# Patient Record
Sex: MALE | Race: WHITE | Employment: FULL TIME | URBAN - METROPOLITAN AREA
[De-identification: names, ages, dates, MRNs, and addresses within clinical notes are randomized per-mention and may not be internally consistent; named-entity substitution may affect disease eponyms.]

---

## 2017-10-03 ENCOUNTER — HOSPITAL ENCOUNTER (OUTPATIENT)
Dept: CT IMAGING | Age: 67
Discharge: HOME | End: 2017-10-03
Attending: INTERNAL MEDICINE
Payer: COMMERCIAL

## 2017-10-03 VITALS — BODY MASS INDEX: 26.41 KG/M2 | HEIGHT: 72 IN | WEIGHT: 195 LBS

## 2017-10-03 DIAGNOSIS — J90 PLEURAL EFFUSION, RIGHT: ICD-10-CM

## 2017-10-03 DIAGNOSIS — J18.9 PNEUMONIA OF RIGHT LOWER LOBE DUE TO INFECTIOUS ORGANISM: ICD-10-CM

## 2017-10-03 LAB — CREAT BLD-MCNC: 1.1 MG/DL (ref 0.8–1.5)

## 2017-10-03 PROCEDURE — 71260 CT THORAX DX C+: CPT

## 2017-10-03 PROCEDURE — 74011000258 HC RX REV CODE- 258: Performed by: INTERNAL MEDICINE

## 2017-10-03 PROCEDURE — 82565 ASSAY OF CREATININE: CPT

## 2017-10-03 PROCEDURE — 74011636320 HC RX REV CODE- 636/320: Performed by: INTERNAL MEDICINE

## 2017-10-03 RX ORDER — SODIUM CHLORIDE 0.9 % (FLUSH) 0.9 %
10 SYRINGE (ML) INJECTION
Status: COMPLETED | OUTPATIENT
Start: 2017-10-03 | End: 2017-10-03

## 2017-10-03 RX ADMIN — Medication 10 ML: at 15:26

## 2017-10-03 RX ADMIN — IOPAMIDOL 80 ML: 755 INJECTION, SOLUTION INTRAVENOUS at 15:26

## 2017-10-03 RX ADMIN — SODIUM CHLORIDE 100 ML: 900 INJECTION, SOLUTION INTRAVENOUS at 15:26

## 2017-10-10 ENCOUNTER — HOSPITAL ENCOUNTER (INPATIENT)
Age: 67
LOS: 5 days | Discharge: HOME | DRG: 165 | End: 2017-10-15
Attending: INTERNAL MEDICINE | Admitting: INTERNAL MEDICINE
Payer: COMMERCIAL

## 2017-10-10 DIAGNOSIS — J86.9 EMPYEMA (HCC): ICD-10-CM

## 2017-10-10 DIAGNOSIS — E11.21 TYPE II DIABETES MELLITUS WITH NEPHROPATHY (HCC): Chronic | ICD-10-CM

## 2017-10-10 DIAGNOSIS — E03.9 HYPOTHYROIDISM, UNSPECIFIED TYPE: Chronic | ICD-10-CM

## 2017-10-10 DIAGNOSIS — J90 PLEURAL EFFUSION ON RIGHT: ICD-10-CM

## 2017-10-10 LAB
ANION GAP SERPL CALC-SCNC: 6 MMOL/L (ref 7–16)
BUN SERPL-MCNC: 18 MG/DL (ref 8–23)
CALCIUM SERPL-MCNC: 9.5 MG/DL (ref 8.3–10.4)
CHLORIDE SERPL-SCNC: 102 MMOL/L (ref 98–107)
CO2 SERPL-SCNC: 28 MMOL/L (ref 21–32)
CREAT SERPL-MCNC: 1.04 MG/DL (ref 0.8–1.5)
ERYTHROCYTE [DISTWIDTH] IN BLOOD BY AUTOMATED COUNT: 12.3 % (ref 11.9–14.6)
GLUCOSE BLD STRIP.AUTO-MCNC: 180 MG/DL (ref 65–100)
GLUCOSE BLD STRIP.AUTO-MCNC: 220 MG/DL (ref 65–100)
GLUCOSE SERPL-MCNC: 139 MG/DL (ref 65–100)
HCT VFR BLD AUTO: 38.3 % (ref 41.1–50.3)
HGB BLD-MCNC: 13.2 G/DL (ref 13.6–17.2)
MCH RBC QN AUTO: 33.2 PG (ref 26.1–32.9)
MCHC RBC AUTO-ENTMCNC: 34.5 G/DL (ref 31.4–35)
MCV RBC AUTO: 96.5 FL (ref 79.6–97.8)
PLATELET # BLD AUTO: 452 K/UL (ref 150–450)
PMV BLD AUTO: 8.9 FL (ref 10.8–14.1)
POTASSIUM SERPL-SCNC: 4.3 MMOL/L (ref 3.5–5.1)
RBC # BLD AUTO: 3.97 M/UL (ref 4.23–5.67)
SODIUM SERPL-SCNC: 136 MMOL/L (ref 136–145)
WBC # BLD AUTO: 9.7 K/UL (ref 4.3–11.1)

## 2017-10-10 PROCEDURE — 76604 US EXAM CHEST: CPT | Performed by: INTERNAL MEDICINE

## 2017-10-10 PROCEDURE — 74011000258 HC RX REV CODE- 258: Performed by: NURSE PRACTITIONER

## 2017-10-10 PROCEDURE — 87205 SMEAR GRAM STAIN: CPT | Performed by: INTERNAL MEDICINE

## 2017-10-10 PROCEDURE — 87077 CULTURE AEROBIC IDENTIFY: CPT | Performed by: INTERNAL MEDICINE

## 2017-10-10 PROCEDURE — 87040 BLOOD CULTURE FOR BACTERIA: CPT | Performed by: NURSE PRACTITIONER

## 2017-10-10 PROCEDURE — 65270000029 HC RM PRIVATE

## 2017-10-10 PROCEDURE — 82962 GLUCOSE BLOOD TEST: CPT

## 2017-10-10 PROCEDURE — 74011636637 HC RX REV CODE- 636/637: Performed by: INTERNAL MEDICINE

## 2017-10-10 PROCEDURE — 74011250637 HC RX REV CODE- 250/637: Performed by: NURSE PRACTITIONER

## 2017-10-10 PROCEDURE — 0W993ZX DRAINAGE OF RIGHT PLEURAL CAVITY, PERCUTANEOUS APPROACH, DIAGNOSTIC: ICD-10-PCS | Performed by: INTERNAL MEDICINE

## 2017-10-10 PROCEDURE — 76040000019: Performed by: INTERNAL MEDICINE

## 2017-10-10 PROCEDURE — 99223 1ST HOSP IP/OBS HIGH 75: CPT | Performed by: INTERNAL MEDICINE

## 2017-10-10 PROCEDURE — 87186 SC STD MICRODIL/AGAR DIL: CPT | Performed by: INTERNAL MEDICINE

## 2017-10-10 PROCEDURE — 36415 COLL VENOUS BLD VENIPUNCTURE: CPT | Performed by: NURSE PRACTITIONER

## 2017-10-10 PROCEDURE — 74011250636 HC RX REV CODE- 250/636: Performed by: NURSE PRACTITIONER

## 2017-10-10 PROCEDURE — 94760 N-INVAS EAR/PLS OXIMETRY 1: CPT

## 2017-10-10 PROCEDURE — 32555 ASPIRATE PLEURA W/ IMAGING: CPT | Performed by: INTERNAL MEDICINE

## 2017-10-10 PROCEDURE — 80048 BASIC METABOLIC PNL TOTAL CA: CPT | Performed by: NURSE PRACTITIONER

## 2017-10-10 PROCEDURE — 74011250637 HC RX REV CODE- 250/637: Performed by: INTERNAL MEDICINE

## 2017-10-10 PROCEDURE — 85027 COMPLETE CBC AUTOMATED: CPT | Performed by: NURSE PRACTITIONER

## 2017-10-10 PROCEDURE — C1729 CATH, DRAINAGE: HCPCS | Performed by: INTERNAL MEDICINE

## 2017-10-10 RX ORDER — LEVOTHYROXINE SODIUM 150 UG/1
75 TABLET ORAL
Status: DISCONTINUED | OUTPATIENT
Start: 2017-10-11 | End: 2017-10-15 | Stop reason: HOSPADM

## 2017-10-10 RX ORDER — SODIUM CHLORIDE 0.9 % (FLUSH) 0.9 %
5-10 SYRINGE (ML) INJECTION AS NEEDED
Status: DISCONTINUED | OUTPATIENT
Start: 2017-10-10 | End: 2017-10-15 | Stop reason: HOSPADM

## 2017-10-10 RX ORDER — ZOLPIDEM TARTRATE 5 MG/1
5 TABLET ORAL
Status: DISCONTINUED | OUTPATIENT
Start: 2017-10-10 | End: 2017-10-15 | Stop reason: HOSPADM

## 2017-10-10 RX ORDER — ENOXAPARIN SODIUM 100 MG/ML
40 INJECTION SUBCUTANEOUS EVERY 24 HOURS
Status: DISCONTINUED | OUTPATIENT
Start: 2017-10-10 | End: 2017-10-10

## 2017-10-10 RX ORDER — BUDESONIDE 0.5 MG/2ML
500 INHALANT ORAL
Status: DISCONTINUED | OUTPATIENT
Start: 2017-10-10 | End: 2017-10-15 | Stop reason: HOSPADM

## 2017-10-10 RX ORDER — MONTELUKAST SODIUM 10 MG/1
10 TABLET ORAL
Status: DISCONTINUED | OUTPATIENT
Start: 2017-10-10 | End: 2017-10-15 | Stop reason: HOSPADM

## 2017-10-10 RX ORDER — ACETAMINOPHEN 325 MG/1
650 TABLET ORAL
Status: DISCONTINUED | OUTPATIENT
Start: 2017-10-10 | End: 2017-10-12

## 2017-10-10 RX ORDER — SIMVASTATIN 40 MG/1
40 TABLET, FILM COATED ORAL
Status: DISCONTINUED | OUTPATIENT
Start: 2017-10-10 | End: 2017-10-15 | Stop reason: HOSPADM

## 2017-10-10 RX ORDER — ALBUTEROL SULFATE 0.83 MG/ML
2.5 SOLUTION RESPIRATORY (INHALATION)
Status: DISCONTINUED | OUTPATIENT
Start: 2017-10-10 | End: 2017-10-15 | Stop reason: HOSPADM

## 2017-10-10 RX ORDER — INSULIN LISPRO 100 [IU]/ML
INJECTION, SOLUTION INTRAVENOUS; SUBCUTANEOUS
Status: DISCONTINUED | OUTPATIENT
Start: 2017-10-10 | End: 2017-10-15 | Stop reason: HOSPADM

## 2017-10-10 RX ORDER — ENOXAPARIN SODIUM 100 MG/ML
40 INJECTION SUBCUTANEOUS DAILY
Status: DISCONTINUED | OUTPATIENT
Start: 2017-10-11 | End: 2017-10-11

## 2017-10-10 RX ORDER — PANTOPRAZOLE SODIUM 40 MG/1
40 TABLET, DELAYED RELEASE ORAL
Status: DISCONTINUED | OUTPATIENT
Start: 2017-10-11 | End: 2017-10-13

## 2017-10-10 RX ORDER — HYDROCODONE BITARTRATE AND ACETAMINOPHEN 7.5; 325 MG/1; MG/1
1 TABLET ORAL
Status: DISCONTINUED | OUTPATIENT
Start: 2017-10-10 | End: 2017-10-14

## 2017-10-10 RX ORDER — SODIUM CHLORIDE 0.9 % (FLUSH) 0.9 %
5-10 SYRINGE (ML) INJECTION EVERY 8 HOURS
Status: DISCONTINUED | OUTPATIENT
Start: 2017-10-10 | End: 2017-10-15 | Stop reason: HOSPADM

## 2017-10-10 RX ORDER — INSULIN GLARGINE 100 [IU]/ML
20 INJECTION, SOLUTION SUBCUTANEOUS
Status: DISCONTINUED | OUTPATIENT
Start: 2017-10-10 | End: 2017-10-14

## 2017-10-10 RX ORDER — METOPROLOL TARTRATE 25 MG/1
25 TABLET, FILM COATED ORAL EVERY 12 HOURS
Status: DISCONTINUED | OUTPATIENT
Start: 2017-10-10 | End: 2017-10-15 | Stop reason: HOSPADM

## 2017-10-10 RX ORDER — FLUTICASONE PROPIONATE AND SALMETEROL 250; 50 UG/1; UG/1
1 POWDER RESPIRATORY (INHALATION)
Status: DISCONTINUED | OUTPATIENT
Start: 2017-10-10 | End: 2017-10-10 | Stop reason: CLARIF

## 2017-10-10 RX ORDER — ALBUTEROL SULFATE 2.5 MG/.5ML
2.5 SOLUTION RESPIRATORY (INHALATION)
Status: DISCONTINUED | OUTPATIENT
Start: 2017-10-10 | End: 2017-10-15 | Stop reason: HOSPADM

## 2017-10-10 RX ORDER — INSULIN GLARGINE 100 [IU]/ML
40 INJECTION, SOLUTION SUBCUTANEOUS
Status: DISCONTINUED | OUTPATIENT
Start: 2017-10-10 | End: 2017-10-10

## 2017-10-10 RX ADMIN — Medication 10 ML: at 17:03

## 2017-10-10 RX ADMIN — ZOLPIDEM TARTRATE 5 MG: 5 TABLET ORAL at 21:45

## 2017-10-10 RX ADMIN — MONTELUKAST SODIUM 10 MG: 10 TABLET, FILM COATED ORAL at 20:52

## 2017-10-10 RX ADMIN — INSULIN GLARGINE 20 UNITS: 100 INJECTION, SOLUTION SUBCUTANEOUS at 20:58

## 2017-10-10 RX ADMIN — PIPERACILLIN SODIUM AND TAZOBACTAM SODIUM 4.5 G: 4; .5 INJECTION, POWDER, LYOPHILIZED, FOR SOLUTION INTRAVENOUS at 17:01

## 2017-10-10 RX ADMIN — METOPROLOL TARTRATE 25 MG: 25 TABLET ORAL at 20:51

## 2017-10-10 RX ADMIN — INSULIN LISPRO 4 UNITS: 100 INJECTION, SOLUTION INTRAVENOUS; SUBCUTANEOUS at 17:01

## 2017-10-10 RX ADMIN — Medication 5 ML: at 20:53

## 2017-10-10 RX ADMIN — SIMVASTATIN 40 MG: 40 TABLET, FILM COATED ORAL at 20:52

## 2017-10-10 RX ADMIN — INSULIN LISPRO 2 UNITS: 100 INJECTION, SOLUTION INTRAVENOUS; SUBCUTANEOUS at 20:58

## 2017-10-10 NOTE — PROGRESS NOTES
Mr. Radha Ray sitting in bed with TV on; ate dinner well. Sister remains at bedside. Verbalized understanding for NPO status after midnight for possible surgical intervention tomorrow. Without needs or complaints. Uneventful afternoon. Call light in lap and door open.

## 2017-10-10 NOTE — PROGRESS NOTES
Skin assessment completed with Charlie Duval RN. Right great toe with small abrasion. Right lateral shin/ankle with small abrasion, and right knee with small abrasion. All other skin without breakdown or edema.

## 2017-10-10 NOTE — PROGRESS NOTES
TRANSFER - IN REPORT:    Verbal report received from Roe Hitchcock, RT, on Simeon Dos Santos  being received from [address] [place] [address] for routine progression of care      Report consisted of patients Situation, Background, Assessment and   Recommendations(SBAR). Information from the following report(s) SBAR and Procedure Summary was reviewed with the receiving nurse. Assessment completed upon patients arrival to unit and care assumed.

## 2017-10-10 NOTE — CONSULTS
Consult Note:   Ramon Cole  MRN: [id number]  :1950  Age:66 y.o.    HPI: Ramon Cole is a 73 y.o. male with PMHx of CABG X 4 (), DM2, HTN, HLD, hypothyroidism s/p thyroidectomy, asthma who presented for an outpatient thoracentesis today with Pulmonology and was admitted due to finding juarez pus on thoracentesis. Pt reports several week h/o cough, dyspnea, right pleuritic pain, wheezing. Pt presented to PCP  and was given course of Rocephin and Levaquin. CXR at that time was reportedly clear. Pt returned for follow up in 10/3 with chest discomfort and had repeat CXR which showed right pleural effusion which prompted chest CT demonstrating the same. Pt was given repeat course of Abx and scheduled for outpatient thoracentesis. Pulmonary was able to tap 5cc of juarez pus from right side today. Studies were sent and are pending. Pt reports wheezing, dyspnea, right pleuritic chest pain aggravated by movement. Pt has seen some improvement over the last week with second course of Abx. WBC from 10/5 was 16.2. Pt currently AF, HTN in 160s/80s. Stable on room air. Cr 1.12. General surgery consulted for consideration of decortication for loculated/complex right empyema. Pt took ASA 81mg this AM and is not on other anticoagulation. Chest CT 10/3:    IMPRESSION:  1. Right pleural effusion which appears partially loculated and complex with  components of relative increased density which could represent transudative  material. There are adjacent airspace opacities, moderate within the right lower  lobe which could represent adjacent pneumonia or atelectasis. 2. Mildly prominent subcarinal and right hilar lymph nodes, presumably reactive.     Past Medical History:   Diagnosis Date    Asthma     controlled, no rescue inhaler; on Dulera 2 x d; singulair    CAD (coronary artery disease)     no MI; CABG May 2013    Candida infection     Diabetes ([facility]     type 2, insulin dependent since age 30; avg fasting 120; A1C last= 6.7 in March '14; hypo @ 79; today (7/31/14) = 304 fasting    Diabetes with ulcer of foot ([facility] 9/30/2014    GERD (gastroesophageal reflux disease)     daily prilosec    Hypercholesterolemia     Hypertension     controlled with med    NSTEMI, initial episode of care ([facility] 4/26/2013    S/P CABG x 4 4/25/2013    Thyroid disease     hypo- on med     Past Surgical History:   Procedure Laterality Date    HX COLONOSCOPY  01/16/2012    due date 01/16/2017    HX CORONARY ARTERY BYPASS GRAFT  4/25/13    x4 vessel, Dr. Jeff Garcia  4/24/2012    multivessel    HX OTHER SURGICAL Right     upper leg hematoma-\"cut it\"     Current Facility-Administered Medications   Medication Dose Route Frequency    insulin glargine (LANTUS) injection 40 Units  40 Units SubCUTAneous QHS    [START ON 10/11/2017] levothyroxine (SYNTHROID) tablet 75 mcg  75 mcg Oral 6am    metoprolol tartrate (LOPRESSOR) tablet 25 mg  25 mg Oral Q12H    montelukast (SINGULAIR) tablet 10 mg  10 mg Oral QHS    simvastatin (ZOCOR) tablet 40 mg  40 mg Oral QHS    sodium chloride (NS) flush 5-10 mL  5-10 mL IntraVENous Q8H    sodium chloride (NS) flush 5-10 mL  5-10 mL IntraVENous PRN    HYDROcodone-acetaminophen (NORCO) 7.5-325 mg per tablet 1 Tab  1 Tab Oral Q4H PRN    enoxaparin (LOVENOX) injection 40 mg  40 mg SubCUTAneous Q24H    piperacillin-tazobactam (ZOSYN) 4.5 g in 0.9% sodium chloride (MBP/ADV) 100 mL  4.5 g IntraVENous Q8H    acetaminophen (TYLENOL) tablet 650 mg  650 mg Oral Q6H PRN    [START ON 10/11/2017] pantoprazole (PROTONIX) tablet 40 mg  40 mg Oral ACB    albuterol (PROVENTIL VENTOLIN) nebulizer solution 2.5 mg  2.5 mg Nebulization Q6H PRN    budesonide (PULMICORT) 500 mcg/2 ml nebulizer suspension  500 mcg Nebulization BID RT    And    albuterol CONCENTRATE 2.5mg/0.5 mL neb soln  2.5 mg Nebulization Q6H RT    insulin lispro (HUMALOG) injection SubCUTAneous AC&HS     Review of patient's allergies indicates no known allergies. Social History     Social History    Marital status:      Spouse name: N/A    Number of children: N/A    Years of education: N/A     Social History Main Topics    Smoking status: Never Smoker    Smokeless tobacco: Never Used    Alcohol use 3.5 oz/week     7 Standard drinks or equivalent per week    Drug use: No    Sexual activity: Not Currently     Partners: Female     Other Topics Concern    None     Social History Narrative     History   Smoking Status    Never Smoker   Smokeless Tobacco    Never Used     Family History   Problem Relation Age of Onset    Cancer Father     Cancer Sister     Hypertension Mother     Diabetes Mother     Stroke Mother     Heart Disease Brother      MI- had CABG    Diabetes Brother     Heart Disease Sister     Hypertension Sister      ROS: The patient has no difficulty with chest pain but has chronic SOB due to asthma. No fever or chills. Comprehensive review of systems was otherwise unremarkable except as noted above. Physical Exam:   Visit Vitals    /71 (BP 1 Location: Right arm, BP Patient Position: At rest)    Pulse 73    Temp 98.6 °F (37 °C)    Resp 14    Ht 6' (1.829 m)    Wt 195 lb (88.5 kg)    SpO2 96%    BMI 26.45 kg/m2     Constitutional: Alert, oriented, cooperative patient in no acute distress; appears stated age    Eyes:Sclera are clear. EOMs intact  ENMT: no external lesions gross hearing normal; no obvious neck masses, no ear or lip lesions, nares normal  CV: RRR. Normal perfusion  Resp: No JVD. Breathing is  non-labored; slight audible wheezing. Decreased breath sounds right base. CTAB left lung fields. Non tender to palpation. GI: soft and non-distended     Musculoskeletal: unremarkable with normal function. No embolic signs or cyanosis.    Neuro:  Oriented; moves all 4; no focal deficits  Psychiatric: normal affect and mood, no memory impairment    Recent vitals (if inpt):  Patient Vitals for the past 24 hrs:   BP Temp Pulse Resp SpO2 Height Weight   10/10/17 1514 127/71 98.6 °F (37 °C) 73 14 96 % - -   10/10/17 1414 162/80 - - - 98 % - -   10/10/17 1409 156/74 - 78 18 98 % - -   10/10/17 1404 156/74 - 77 16 97 % - -   10/10/17 1351 163/72 - 80 18 98 % - -   10/10/17 1334 - 98.4 °F (36.9 °C) - - - 6' (1.829 m) 195 lb (88.5 kg)       Labs:  No results for input(s): WBC, HGB, PLT, NA, K, CL, CO2, BUN, CREA, GLU, PTP, INR, APTT, TBIL, TBILI, CBIL, SGOT, GPT, ALT, AP, AML, LPSE, LCAD, NH4, TROPT, TROIQ, PCO2, PO2, HCO3, HGBEXT, PLTEXT in the last 72 hours. No lab exists for component:  PH, INREXT    Lab Results   Component Value Date/Time    WBC 5.4 10/07/2014 10:15 AM    HGB 14.6 10/07/2014 10:15 AM    PLATELET 727 50/66/5313 10:15 AM    Sodium 136 10/03/2017 03:45 PM    Potassium 5.1 10/03/2017 03:45 PM    Chloride 98 10/03/2017 03:45 PM    CO2 22 10/03/2017 03:45 PM    BUN 18 10/03/2017 03:45 PM    Creatinine 1.12 10/03/2017 03:45 PM    Glucose 309 10/03/2017 03:45 PM    INR 1.2 04/25/2013 01:32 PM    aPTT 26.9 04/25/2013 01:32 PM    Bilirubin, total 0.7 10/03/2017 03:45 PM    AST (SGOT) 20 10/03/2017 03:45 PM    ALT (SGPT) 20 10/03/2017 03:45 PM    Alk.  phosphatase 42 10/03/2017 03:45 PM    Troponin-I, Qt. <0.02 04/24/2013 11:56 AM       Admission date (for inpatients): 10/10/2017   Day of Surgery  Procedure(s):  ULTRASOUND  * needs consult*  THORACENTESIS    ASSESSMENT/PLAN:  Problem List  Date Reviewed: 10/5/2017          Codes Class Noted    * (Principal)Pleural effusion on right ICD-10-CM: J90  ICD-9-CM: 511.9  10/10/2017        Empyema ([facility] ICD-10-CM: J86.9  ICD-9-CM: 510.9  10/10/2017        Type 2 diabetes mellitus with both eyes affected by mild nonproliferative retinopathy without macular edema, with long-term current use of insulin (HCC) (Chronic) ICD-10-CM: T38.2762, Z79.4  ICD-9-CM: 250.50, 362.04, V58.67  11/8/2016 Diabetic peripheral neuropathy (HCC) (Chronic) ICD-10-CM: E11.42  ICD-9-CM: 250.60, 357.2  11/8/2016        Coronary artery disease involving native coronary artery without angina pectoris (Chronic) ICD-10-CM: I25.10  ICD-9-CM: 414.01  9/25/2015        Diabetes with ulcer of foot ([facility] (Chronic) ICD-10-CM: E11.621, L97.509  ICD-9-CM: 250.80, 707.15  9/30/2014        Peripheral neuropathy (Chronic) ICD-10-CM: G62.9  ICD-9-CM: 356.9  8/26/2014        Fatigue ICD-10-CM: R53.83  ICD-9-CM: 780.79  5/27/2014        Hyperlipidemia (Chronic) ICD-10-CM: P21.3  ICD-9-CM: 272.4  5/27/2014        CAD (coronary artery disease) (Chronic) ICD-10-CM: I25.10  ICD-9-CM: 414.00  4/25/2013        Type II or unspecified type diabetes mellitus without mention of complication, not stated as uncontrolled (Chronic) ICD-10-CM: E11.9  ICD-9-CM: 250.00  4/23/2013        Essential hypertension, benign (Chronic) ICD-10-CM: I10  ICD-9-CM: 401.1  4/23/2013        Other and unspecified hyperlipidemia (Chronic) ICD-10-CM: E78.5  ICD-9-CM: 272.4  4/23/2013        Hypothyroidism (Chronic) ICD-10-CM: E03.9  ICD-9-CM: 244.9  4/23/2013        Asthma (Chronic) ICD-10-CM: J45.909  ICD-9-CM: 493.90  4/23/2013        Family history of coronary artery disease (Chronic) ICD-10-CM: Z82.49  ICD-9-CM: V17.3  4/23/2013            Principal Problem:    Pleural effusion on right (10/10/2017)    Active Problems:    Empyema ([facility] (10/10/2017)       Dr. Arley Queen will review imaging and evaluate patient for consideration of right decortication versus IR drain placement. Signed:  CAROLYNN Buck     I have seen and examined the patient,   and agree with the PA the above assessment and plan. He will likely need right thoracotomy for decortication. Plan this Thursday or Friday.      Amos Anaya MD

## 2017-10-10 NOTE — PROGRESS NOTES
TRANSFER - OUT REPORT:    Verbal report given to Candance RN(name) on Erin Herman  being transferred to [facility](unit) for routine post - op       Report consisted of patients Situation, Background, Assessment and   Recommendations(SBAR). Information from the following report(s) Procedure Summary was reviewed with the receiving nurse. Opportunity for questions and clarification was provided.       Patient transported with:   [place]

## 2017-10-10 NOTE — IP AVS SNAPSHOT
Dimitry Herron 
 
 
 [address] 
[phone] Patient: Nico Paul MRN: [id number] You are allergic to the following No active allergies Immunizations Administered for This Admission Name Date Pneumococcal Polysaccharide (PPSV-23) 10/15/2017 Recent Documentation Height Weight BMI Smoking Status 1.829 m 88.5 kg 26.45 kg/m2 Never Smoker Emergency Contacts Name Discharge Info Relation Home Work Mobile Parisa Beaver  Other Relative [6] [phone] About your hospitalization You were admitted on:  October 10, 2017 You last received care in the:  [facility] 2 SURGICAL You were discharged on:  October 15, 2017 Unit phone number:  [phone] Why you were hospitalized Your primary diagnosis was:  Pleural Effusion On Right Your diagnoses also included:  Empyema (Hcc), Hypothyroidism, Type Ii Diabetes Mellitus With Nephropathy (Hcc) Providers Seen During Your Hospitalizations Provider Role Specialty Primary office phone Devona Blizzard, MD Attending Provider Pulmonary Disease [phone] iVet Salmeron MD Attending Provider Pulmonary Disease [phone] Your Primary Care Physician (PCP) Primary Care Physician Office Phone Office Fax [address] Pending sale to [facility] [phone] Follow-up Information Follow up With Details Comments Contact Info Shon Hill MD Call in 1 day Call Monday to schedule a follow up appt. with  in 7-10 days  [address] [place] [address] [facility] [id number] [phone] [facility] Call in 2 weeks Call  to follow up with CXR [place] [address] [address] [phone] Viet Salmeron MD Call Call Monday for appt in 2 weeks with CXR [place] [address] [facility] [address] [phone] Bridgette Robles MD Call Call Monday for appt in 7- 10 days [facility] [address] Scarlet Mata [address] [facility] [address] 
[phone] Your Appointments Tuesday November 28, 2017  1:00 PM EST Follow Up with Manuel Benitez MD  
[place] [facility] ([address]) [address]  
[phone] Current Discharge Medication List  
  
START taking these medications Dose & Instructions Dispensing Information Comments Morning Noon Evening Bedtime  
 oxyCODONE-acetaminophen 5-325 mg per tablet Commonly known as:  PERCOCET Your last dose was: Your next dose is:    
   
   
 1-2 tabs by mouth every four hours prn pain Quantity:  40 Tab Refills:  0  
     
   
   
   
  
 trimethoprim-sulfamethoxazole 160-800 mg per tablet Commonly known as:  BACTRIM DS Your last dose was: Your next dose is:    
   
   
 Dose:  1 Tab Take 1 Tab by mouth two (2) times a day for 8 days. Quantity:  16 Tab Refills:  0 CONTINUE these medications which have NOT CHANGED Dose & Instructions Dispensing Information Comments Morning Noon Evening Bedtime  
 aspirin delayed-release 81 mg tablet Your last dose was: Your next dose is:    
   
   
 Dose:  81 mg Take 81 mg by mouth every morning. Indications: MYOCARDIAL INFARCTION PREVENTION Refills:  0  
     
   
   
   
  
 fluticasone-salmeterol 250-50 mcg/dose diskus inhaler Commonly known as:  ADVAIR DISKUS Your last dose was: Your next dose is:    
   
   
 INHALE ONE DOSE BY MOUTH EVERY 12 HOURS Quantity:  3 Inhaler Refills:  3  
     
   
   
   
  
 insulin aspart 100 unit/mL Inpn Commonly known as:  Meron Kahn Your last dose was: Your next dose is: INJECT UP TO 10 UNITS SUBCUTANEOUSLY 3 TIMES DAILY Quantity:  15 Pen Refills:  1 insulin glargine 100 unit/mL (3 mL) Inpn Commonly known as:  LANTUS SOLOSTAR Your last dose was: Your next dose is:    
   
   
 INECT 40 UNITS SUBCUTANEOUSLY EVERY EVENING Quantity:  15 Pen Refills:  1  
     
   
   
   
  
 levothyroxine 75 mcg tablet Commonly known as:  SYNTHROID Your last dose was: Your next dose is: TAKE ONE TABLET BY MOUTH ONCE DAILY BEFORE  BREAKFAST Quantity:  90 Tab Refills:  3  
     
   
   
   
  
 metoprolol tartrate 25 mg tablet Commonly known as:  LOPRESSOR Your last dose was: Your next dose is:    
   
   
 Dose:  25 mg Take 1 Tab by mouth every twelve (12) hours. Quantity:  180 Tab Refills:  3  
     
   
   
   
  
 montelukast 10 mg tablet Commonly known as:  SINGULAIR Your last dose was: Your next dose is: TAKE ONE TABLET BY MOUTH IN THE MORNING FOR  ASTHMA  PREVENTION. Quantity:  90 Tab Refills:  3 PRILOSEC OTC PO Your last dose was: Your next dose is: Take  by mouth every morning. Indications: for GERD Refills:  0  
     
   
   
   
  
 simvastatin 40 mg tablet Commonly known as:  ZOCOR Your last dose was: Your next dose is:    
   
   
 Dose:  40 mg Take 1 Tab by mouth nightly. Quantity:  90 Tab Refills:  3 STOP taking these medications   
 levoFLOXacin 750 mg tablet Commonly known as:  Yuki [facility] Where to Get Your Medications Information on where to get these meds will be given to you by the nurse or doctor. ! Ask your nurse or doctor about these medications  
  oxyCODONE-acetaminophen 5-325 mg per tablet  
 trimethoprim-sulfamethoxazole 160-800 mg per tablet Discharge Instructions Chest Tube: What to Expect at [facility] Your Recovery A chest tube is placed through the chest wall between two ribs.  You had a chest tube put in to help your collapsed lung expand. The tube was removed before you came home. You may have some pain in your chest from the cut (incision) where the tube was put in. For most people, the pain goes away after about 2 weeks. You will have a bandage taped over the wound. Your doctor will remove the bandage and examine the wound in about 2 days. It will take about 3 to 4 weeks for your incision to heal completely. It may leave a small scar that will fade with time. This care sheet gives you a general idea about how long it will take for you to recover. But each person recovers at a different pace. Follow the steps below to feel better as quickly as possible. How can you care for yourself at home? Activity · Rest when you feel tired. Getting enough sleep will help you recover. · Try to walk each day. Start by walking a little more than you did the day before. Bit by bit, increase the amount you walk. Walking boosts blood flow and helps prevent pneumonia and constipation. · Avoid strenuous activities, such as bicycle riding, jogging, weight lifting, or aerobic exercise, until your doctor says it is okay. · How soon you can return to work or your normal routine depends on what health problems you have. Talk with your doctor about how long it will take you to recover. · You may shower after your bandage is removed. Pat the cut (incision) dry. Do not take a bath for 2 weeks after your chest tube is out, or until your doctor tells you it is okay. · Practice deep breathing exercises as directed by your doctor. Coughing exercises also can help drain fluid out of your chest. 
Diet · You can eat your normal diet. If your stomach is upset, try bland, low-fat foods like plain rice, broiled chicken, toast, and yogurt. · Drink plenty of fluids (unless your doctor tells you not to). Medicines · Your doctor will tell you if and when you can restart your medicines.  He or she will also give you instructions about taking any new medicines. · If you take blood thinners, such as warfarin (Coumadin), clopidogrel (Plavix), or aspirin, be sure to talk to your doctor. He or she will tell you if and when to start taking those medicines again. Make sure that you understand exactly what your doctor wants you to do. · Be safe with medicines. Take pain medicines exactly as directed. ¨ If the doctor gave you a prescription medicine for pain, take it as prescribed. ¨ If you are not taking a prescription pain medicine, ask your doctor if you can take an over-the-counter medicine. · Take your antibiotics as directed. Do not stop taking them just because you feel better. You need to take the full course of antibiotics. Incision care · Keep the incision dry as it heals. You will have a bandage over it to help the incision heal and protect it. Your doctor will tell you how to take care of this. Other instructions · Do not smoke. Smoking makes lung problems worse. If you need help quitting, talk to your doctor about stop-smoking programs and medicines. These can increase your chances of quitting for good. Follow-up care is a key part of your treatment and safety. Be sure to make and go to all appointments, and call your doctor if you are having problems. It's also a good idea to know your test results and keep a list of the medicines you take. When should you call for help? Call 911 anytime you think you may need emergency care. For example, call if: 
· You passed out (lost consciousness). · You have severe trouble breathing. · You have sudden chest pain and shortness of breath, or you cough up blood. Call your doctor now or seek immediate medical care if: 
· You continue to have trouble breathing. · Your shortness of breath is getting worse. · Bright red blood soaks through the bandage over your incision. · You have a fever. Watch closely for any changes in your health, and be sure to contact your doctor if: 
· You do not get better as expected. Where can you learn more? Go to http://marlyn-barrington.info/. Enter W988 in the search box to learn more about \"Chest Tube: What to Expect at Home. \" Current as of: March 25, 2017 Content Version: 11.3 © 2150-4345 [employer]. Care instructions adapted under license by [employer] (which disclaims liability or warranty for this information). If you have questions about a medical condition or this instruction, always ask your healthcare professional. [address] any warranty or liability for your use of this information. Discharge Orders None [employer] Announcement We are excited to announce that we are making your provider's discharge notes available to you in [employer]. You will see these notes when they are completed and signed by the physician that discharged you from your recent hospital stay. If you have any questions or concerns about any information you see in [employer], please call the Health Information Department where you were seen or reach out to your Primary Care Provider for more information about your plan of care. General Information Please provide this summary of care documentation to your next provider. Patient Signature:  ____________________________________________________________ Date:  ____________________________________________________________  
  
[facility] Provider Signature:  ____________________________________________________________ Date:  ____________________________________________________________

## 2017-10-10 NOTE — PROGRESS NOTES
Patient admitted to room 801. Alert, oriented in all spheres. Sister at bedside. All right lung fields are very diminished with diminished left base and some scattered wheeze. States no cough or dyspnea on room air. States no pain or needs at this time. Call light in lap and door open.

## 2017-10-10 NOTE — PROGRESS NOTES
Pt sat up on side of bed for thoracentesis. Consent obtained. Time out performed. Pts vitals monitored throughout procedure. Left and right ultrasound done. 2 cc of pus removed from right side . Pt tolerated procedure well with no adverse rxn. Specimens sent to the lab x 1 and labeled appropriately. Site dressed appropriately. Pt was admitted per Dr. Christal Soni.

## 2017-10-10 NOTE — IP AVS SNAPSHOT
Laury Lowe 
 
 
 [address] [facility] [address] [facility] 
[phone] Patient: Bravo Alanis MRN: [id number] MFW:[birth year] Current Discharge Medication List  
  
START taking these medications Dose & Instructions Dispensing Information Comments Morning Noon Evening Bedtime  
 oxyCODONE-acetaminophen 5-325 mg per tablet Commonly known as:  PERCOCET Your last dose was: Your next dose is:    
   
   
 1-2 tabs by mouth every four hours prn pain Quantity:  40 Tab Refills:  0  
     
   
   
   
  
 trimethoprim-sulfamethoxazole 160-800 mg per tablet Commonly known as:  BACTRIM DS Your last dose was: Your next dose is:    
   
   
 Dose:  1 Tab Take 1 Tab by mouth two (2) times a day for 8 days. Quantity:  16 Tab Refills:  0 CONTINUE these medications which have NOT CHANGED Dose & Instructions Dispensing Information Comments Morning Noon Evening Bedtime  
 aspirin delayed-release 81 mg tablet Your last dose was: Your next dose is:    
   
   
 Dose:  81 mg Take 81 mg by mouth every morning. Indications: MYOCARDIAL INFARCTION PREVENTION Refills:  0  
     
   
   
   
  
 fluticasone-salmeterol 250-50 mcg/dose diskus inhaler Commonly known as:  ADVAIR DISKUS Your last dose was: Your next dose is:    
   
   
 INHALE ONE DOSE BY MOUTH EVERY 12 HOURS Quantity:  3 Inhaler Refills:  3  
     
   
   
   
  
 insulin aspart 100 unit/mL Inpn Commonly known as:  Cande Arreola Your last dose was: Your next dose is: INJECT UP TO 10 UNITS SUBCUTANEOUSLY 3 TIMES DAILY Quantity:  15 Pen Refills:  1  
     
   
   
   
  
 insulin glargine 100 unit/mL (3 mL) Inpn Commonly known as:  LANTUS SOLOSTAR Your last dose was:     
   
Your next dose is:    
   
   
 INECT 40 UNITS SUBCUTANEOUSLY EVERY EVENING  
 Quantity:  15 Pen Refills:  1  
     
   
   
   
  
 levothyroxine 75 mcg tablet Commonly known as:  SYNTHROID Your last dose was: Your next dose is: TAKE ONE TABLET BY MOUTH ONCE DAILY BEFORE  BREAKFAST Quantity:  90 Tab Refills:  3  
     
   
   
   
  
 metoprolol tartrate 25 mg tablet Commonly known as:  LOPRESSOR Your last dose was: Your next dose is:    
   
   
 Dose:  25 mg Take 1 Tab by mouth every twelve (12) hours. Quantity:  180 Tab Refills:  3  
     
   
   
   
  
 montelukast 10 mg tablet Commonly known as:  SINGULAIR Your last dose was: Your next dose is: TAKE ONE TABLET BY MOUTH IN THE MORNING FOR  ASTHMA  PREVENTION. Quantity:  90 Tab Refills:  3 PRILOSEC OTC PO Your last dose was: Your next dose is: Take  by mouth every morning. Indications: for GERD Refills:  0  
     
   
   
   
  
 simvastatin 40 mg tablet Commonly known as:  ZOCOR Your last dose was: Your next dose is:    
   
   
 Dose:  40 mg Take 1 Tab by mouth nightly. Quantity:  90 Tab Refills:  3 STOP taking these medications   
 levoFLOXacin 750 mg tablet Commonly known as:  Srinath Sharma Where to Get Your Medications Information on where to get these meds will be given to you by the nurse or doctor. ! Ask your nurse or doctor about these medications  
  oxyCODONE-acetaminophen 5-325 mg per tablet  
 trimethoprim-sulfamethoxazole 160-800 mg per tablet

## 2017-10-10 NOTE — H&P
HISTORY AND PHYSICAL      Heri oHwe    10/10/2017    Date of Admission:  10/10/2017    The patient's chart is reviewed and the patient is discussed with the staff. Subjective:     Patient is a 73 y.o.  male presents with a history of asthma. He has previously been seen in our office but thinks it was 6-8 years ago. He was seen by his PCP, Dr. Shahid Guzman, on Sept 25th with cough, wheezing, dyspnea and right sided pleuritic chest pain. His CXR then was reportedly clear. He was treated for suspected bronchitis using IM Rocephin followed by a 7 day course of Levaquin. He was seen in follow up in Oct 3. He reported ongoing chest discomfort. His CXR at that time showed a right pleural effusion. A CT scan was obtained which confirmed this. His WBC is still elevated at 16.1 so he has since received 2 more daily doses of IM Rocephin and been given an additional 10 day course of Levaquin. He was referred to [facility] for thoracentesis. He states that overall his symptoms have improved, though he has ongoing fatigue, lack of energy, and shortness of breath more than his baseline. He denies any ongoing fever. Some ongoing cough with a small amount of yellow sputum. He also reports ongoing pleuritic chest pain. He states he is a never smoker. He denies any history of asbestos or any other inhaled irritants. Review of Systems  A comprehensive review of systems was negative except for that written in the HPI.     Patient Active Problem List   Diagnosis Code    Type II or unspecified type diabetes mellitus without mention of complication, not stated as uncontrolled E11.9    Essential hypertension, benign I10    Other and unspecified hyperlipidemia E78.5    Hypothyroidism E03.9    Asthma J45.909    Family history of coronary artery disease Z80.55    CAD (coronary artery disease) I25.10    Fatigue R53.83    Hyperlipidemia E78.5    Peripheral neuropathy G62.9    Diabetes with ulcer of foot ([facility] E11.621, L97.509    Coronary artery disease involving native coronary artery without angina pectoris I25.10    Type 2 diabetes mellitus with both eyes affected by mild nonproliferative retinopathy without macular edema, with long-term current use of insulin ([facility] N06.9010, Z79.4    Diabetic peripheral neuropathy (HCC) E11.42    Pleural effusion on right J90       Cannot display prior to admission medications because the patient has not been admitted in this contact. Past Medical History:   Diagnosis Date    Asthma     controlled, no rescue inhaler; on Dulera 2 x d; singulair    CAD (coronary artery disease)     no MI; CABG May 2013    Candida infection     Diabetes ([facility]     type 2, insulin dependent since age 30; avg fasting 120; A1C last= 6.7 in March '14; hypo @ 79; today (7/31/14) = 304 fasting    Diabetes with ulcer of foot ([facility] 9/30/2014    GERD (gastroesophageal reflux disease)     daily prilosec    Hypercholesterolemia     Hypertension     controlled with med    NSTEMI, initial episode of care ([place] [address] 4/26/2013    S/P CABG x 4 4/25/2013    Thyroid disease     hypo- on med     Past Surgical History:   Procedure Laterality Date    HX COLONOSCOPY  01/16/2012    due date 01/16/2017    HX CORONARY ARTERY BYPASS GRAFT  4/25/13    x4 vessel, Dr. Charles Montoya  4/24/2012    multivessel    HX OTHER SURGICAL Right     upper leg hematoma-\"cut it\"     Social History     Social History    Marital status:      Spouse name: N/A    Number of children: N/A    Years of education: N/A     Occupational History    Not on file.      Social History Main Topics    Smoking status: Never Smoker    Smokeless tobacco: Never Used    Alcohol use 3.5 oz/week     7 Standard drinks or equivalent per week    Drug use: No    Sexual activity: Not Currently     Partners: Female     Other Topics Concern    Not on file     Social History Narrative     Family History   Problem Relation Age of Onset    Cancer Father     Cancer Sister     Hypertension Mother     Diabetes Mother     Stroke Mother     Heart Disease Brother      MI- had CABG    Diabetes Brother     Heart Disease Sister     Hypertension Sister      No Known Allergies    No current facility-administered medications for this encounter. Objective:     Vitals:    10/10/17 1334 10/10/17 1351 10/10/17 1404   BP:  163/72 156/74   Pulse:  80 77   Resp:  18    Temp: 98.4 °F (36.9 °C)     SpO2:  98% 97%   Weight: 195 lb (88.5 kg)     Height: 6' (1.829 m)         PHYSICAL EXAM     Constitutional:  the patient is well developed and in no acute distress  HEENT:  Sclera clear, pupils equal, oral mucosa moist  Respiratory: Mildly reduced at right base  Cardiovascular:  RRR without M,G,R  Abd/GI: soft and non-tender; with positive bowel sounds. Ext: warm without cyanosis. There is no lower leg edema. Skin:  no jaundice or rashes, no wounds   Neuro: no gross neuro deficits     Musculoskeletal: moves all four extremities with equal strength. No deformities      Chest CT:     10/3/17    1. Right pleural effusion which appears partially loculated and complex with  components of relative increased density which could represent transudative  material. There are adjacent airspace opacities, moderate within the right lower  lobe which could represent adjacent pneumonia or atelectasis. 2. Mildly prominent subcarinal and right hilar lymph nodes, presumably reactive. Assessment:  (Medical Decision Making)     Hospital Problems  Date Reviewed: 10/5/2017          Codes Class Noted POA    * (Principal)Pleural effusion on right ICD-10-CM: J90  ICD-9-CM: 511.9  10/10/2017 Yes            Presumably parapneumonic right pleural effusion based on his history of acute febrile illness. Plan:  (Medical Decision Making)     -will evaluate with US for possible thoracentesis.    -some suggestion of loculation on CT. May require tube drainage or surgical intervention. -will need to follow up in our office for results and to ensure resolution.      Marcos Vick MD    More than 50% of time documented was spent face-to-face contact with the patient and in the care of the patient on the floor/unit where the patient is located

## 2017-10-10 NOTE — PROGRESS NOTES
Admission orders reviewed. Will d/c lovenox, make NPO at MN, and 1/2 patient's glargine just in case patient could go to OR tomorrow. F/u surgery recs and will restart diet if able.      Debi Aquino MD

## 2017-10-10 NOTE — PROCEDURES
analysis. No additional fluid was able to be removed. Fluid was sent for the following tests:    Routine culture and Gram stain (limited quantity of fluid, no additional studies sent)    Post procedure US confirmed incomplete drainage of the effusion and presence of lung sliding, ruling out pneumothorax. EBL:     <6OS    COMPLICATIONS:    Unable to drain fluid. Minimal return for studies. With juarez pus return, diagnose patient with empyema. I worry that any drain placed by IR would also fail to completely drain this space. Will admit patient and consult surgery to consider surgical evacuation.        Maximiliano Booth MD

## 2017-10-11 ENCOUNTER — ANESTHESIA EVENT (OUTPATIENT)
Dept: SURGERY | Age: 67
DRG: 165 | End: 2017-10-11
Payer: COMMERCIAL

## 2017-10-11 PROBLEM — E11.21 TYPE II DIABETES MELLITUS WITH NEPHROPATHY (HCC): Chronic | Status: ACTIVE | Noted: 2017-10-11

## 2017-10-11 PROBLEM — E03.9 HYPOTHYROIDISM: Chronic | Status: ACTIVE | Noted: 2017-10-10

## 2017-10-11 LAB
GLUCOSE BLD STRIP.AUTO-MCNC: 268 MG/DL (ref 65–100)
GLUCOSE BLD STRIP.AUTO-MCNC: 292 MG/DL (ref 65–100)
GLUCOSE BLD STRIP.AUTO-MCNC: 298 MG/DL (ref 65–100)
GLUCOSE BLD STRIP.AUTO-MCNC: 299 MG/DL (ref 65–100)
GLUCOSE BLD STRIP.AUTO-MCNC: 403 MG/DL (ref 65–100)

## 2017-10-11 PROCEDURE — 74011250637 HC RX REV CODE- 250/637: Performed by: NURSE PRACTITIONER

## 2017-10-11 PROCEDURE — 74011250636 HC RX REV CODE- 250/636: Performed by: NURSE PRACTITIONER

## 2017-10-11 PROCEDURE — 74011636637 HC RX REV CODE- 636/637: Performed by: INTERNAL MEDICINE

## 2017-10-11 PROCEDURE — 74011250636 HC RX REV CODE- 250/636: Performed by: SURGERY

## 2017-10-11 PROCEDURE — 65270000029 HC RM PRIVATE

## 2017-10-11 PROCEDURE — 74011000258 HC RX REV CODE- 258: Performed by: NURSE PRACTITIONER

## 2017-10-11 PROCEDURE — 82962 GLUCOSE BLOOD TEST: CPT

## 2017-10-11 PROCEDURE — 99232 SBSQ HOSP IP/OBS MODERATE 35: CPT | Performed by: INTERNAL MEDICINE

## 2017-10-11 RX ORDER — MAG HYDROX/ALUMINUM HYD/SIMETH 200-200-20
30 SUSPENSION, ORAL (FINAL DOSE FORM) ORAL
Status: DISCONTINUED | OUTPATIENT
Start: 2017-10-11 | End: 2017-10-15 | Stop reason: HOSPADM

## 2017-10-11 RX ADMIN — INSULIN LISPRO 6 UNITS: 100 INJECTION, SOLUTION INTRAVENOUS; SUBCUTANEOUS at 12:00

## 2017-10-11 RX ADMIN — METOPROLOL TARTRATE 25 MG: 25 TABLET ORAL at 21:42

## 2017-10-11 RX ADMIN — PIPERACILLIN SODIUM AND TAZOBACTAM SODIUM 4.5 G: 4; .5 INJECTION, POWDER, LYOPHILIZED, FOR SOLUTION INTRAVENOUS at 00:39

## 2017-10-11 RX ADMIN — INSULIN LISPRO 6 UNITS: 100 INJECTION, SOLUTION INTRAVENOUS; SUBCUTANEOUS at 08:39

## 2017-10-11 RX ADMIN — SIMVASTATIN 40 MG: 40 TABLET, FILM COATED ORAL at 21:42

## 2017-10-11 RX ADMIN — MONTELUKAST SODIUM 10 MG: 10 TABLET, FILM COATED ORAL at 21:41

## 2017-10-11 RX ADMIN — METOPROLOL TARTRATE 25 MG: 25 TABLET ORAL at 08:42

## 2017-10-11 RX ADMIN — INSULIN GLARGINE 20 UNITS: 100 INJECTION, SOLUTION SUBCUTANEOUS at 21:49

## 2017-10-11 RX ADMIN — Medication 10 ML: at 05:32

## 2017-10-11 RX ADMIN — INSULIN LISPRO 6 UNITS: 100 INJECTION, SOLUTION INTRAVENOUS; SUBCUTANEOUS at 17:25

## 2017-10-11 RX ADMIN — PIPERACILLIN SODIUM AND TAZOBACTAM SODIUM 4.5 G: 4; .5 INJECTION, POWDER, LYOPHILIZED, FOR SOLUTION INTRAVENOUS at 08:40

## 2017-10-11 RX ADMIN — Medication 5 ML: at 17:28

## 2017-10-11 RX ADMIN — ENOXAPARIN SODIUM 40 MG: 40 INJECTION SUBCUTANEOUS at 08:41

## 2017-10-11 RX ADMIN — PIPERACILLIN SODIUM AND TAZOBACTAM SODIUM 4.5 G: 4; .5 INJECTION, POWDER, LYOPHILIZED, FOR SOLUTION INTRAVENOUS at 17:27

## 2017-10-11 RX ADMIN — Medication 10 ML: at 21:42

## 2017-10-11 RX ADMIN — ALUMINUM HYDROXIDE, MAGNESIUM HYDROXIDE, AND SIMETHICONE 30 ML: 200; 200; 20 SUSPENSION ORAL at 17:31

## 2017-10-11 RX ADMIN — INSULIN LISPRO 10 UNITS: 100 INJECTION, SOLUTION INTRAVENOUS; SUBCUTANEOUS at 21:49

## 2017-10-11 RX ADMIN — PANTOPRAZOLE SODIUM 40 MG: 40 TABLET, DELAYED RELEASE ORAL at 08:40

## 2017-10-11 NOTE — PROGRESS NOTES
Assessment completed, see doc flowsheet. Pt in the bed. RA. Denies pain, need or complaints. No distress. Pt aware to call for needs. Anesthesia at the bedside.

## 2017-10-11 NOTE — ANESTHESIA PREPROCEDURE EVALUATION
Anesthetic History   No history of anesthetic complications            Review of Systems / Medical History  Patient summary reviewed and pertinent labs reviewed    Pulmonary            Asthma : well controlled       Neuro/Psych   Within defined limits           Cardiovascular    Hypertension          CAD, CABG (May 2013) and hyperlipidemia    Exercise tolerance: <4 METS  Comments: Stress Test 4/2014 was negative for ischemia and showed EF of 65%. Normal valves 2013. Denies chest pain orthopnea, SOB.  SOB with exertion.    GI/Hepatic/Renal     GERD: well controlled           Endo/Other    Diabetes (Foot ulcer): poorly controlled, type 2  Hypothyroidism       Other Findings            Physical Exam    Airway  Mallampati: III  TM Distance: 4 - 6 cm  Neck ROM: normal range of motion   Mouth opening: Normal     Cardiovascular  Regular rate and rhythm,  S1 and S2 normal,  no murmur, click, rub, or gallop  Rhythm: regular  Rate: normal      Pertinent negatives: No murmur, JVD and peripheral edema   Dental         Pulmonary    Rhonchi:left  Decreased breath sounds: right           Abdominal  GI exam deferred       Other Findings            Anesthetic Plan    ASA: 3  Anesthesia type: general    Monitoring Plan: Arterial line      Induction: Intravenous  Anesthetic plan and risks discussed with: Patient      SCOT

## 2017-10-11 NOTE — PROGRESS NOTES
Problem: Interdisciplinary Rounds  Goal: Interdisciplinary Rounds  Outcome: Progressing Towards Goal  Interdisciplinary team rounds were held 10/11/2017 with the following team members:Care Management, Nursing and Clinical Coordinator and the patient. Plan of care discussed. See clinical pathway and/or care plan for interventions and desired outcomes.

## 2017-10-11 NOTE — PROGRESS NOTES
Patient resting comfortably in bed, offers no complaints. Remains npo as per MD order. Will monitor.

## 2017-10-11 NOTE — PROGRESS NOTES
Nico Paul  Admission Date: 10/10/2017             Daily Progress Note: 10/11/2017    The patient's chart is reviewed and the patient is discussed with the staff. 73 y.o. CLARISSA presents with a history of asthma and previously been seen in our office 6-8 years ago. Was seen by his PCP, Dr. Jak Thakkar, on Sept 25th with cough, wheezing, dyspnea and right sided pleuritic chest pain. His CXR then was reportedly clear. He was treated for suspected bronchitis with Rocephin and Levaquin. Was seen 10/3 with ongoing chest discomfort and CXR showed a right pleural effusion and chest CT scan which confirmed. WBC was elevated 16.1 and he received 2 more daily doses of IM Rocephin and additional 10 day course of Levaquin. He was referred to [place] [address] [facility] for thoracentesis. Had ongoing chest pain, productive cough with yellow sputum, fatigue, lack of energy and shortness of breath. Right thoracentesis attempted with 5ml juarez pus and no additional fluid could be removed. General surgery was consulted for empyema. Subjective:     Resting in bed, denies pain or shortness of breath. Ambulating to bathroom and on room air. No productive cough. Kept NPO last night for possible surgery if can be arranged for today--may be Thursday or Friday.     Current Facility-Administered Medications   Medication Dose Route Frequency    levothyroxine (SYNTHROID) tablet 75 mcg  75 mcg Oral 6am    metoprolol tartrate (LOPRESSOR) tablet 25 mg  25 mg Oral Q12H    montelukast (SINGULAIR) tablet 10 mg  10 mg Oral QHS    simvastatin (ZOCOR) tablet 40 mg  40 mg Oral QHS    sodium chloride (NS) flush 5-10 mL  5-10 mL IntraVENous Q8H    sodium chloride (NS) flush 5-10 mL  5-10 mL IntraVENous PRN    HYDROcodone-acetaminophen (NORCO) 7.5-325 mg per tablet 1 Tab  1 Tab Oral Q4H PRN    piperacillin-tazobactam (ZOSYN) 4.5 g in 0.9% sodium chloride (MBP/ADV) 100 mL  4.5 g IntraVENous Q8H    acetaminophen (TYLENOL) tablet 650 mg  650 mg Oral Q6H PRN    pantoprazole (PROTONIX) tablet 40 mg  40 mg Oral ACB    albuterol (PROVENTIL VENTOLIN) nebulizer solution 2.5 mg  2.5 mg Nebulization Q6H PRN    budesonide (PULMICORT) 500 mcg/2 ml nebulizer suspension  500 mcg Nebulization BID RT    And    albuterol CONCENTRATE 2.5mg/0.5 mL neb soln  2.5 mg Nebulization Q6H RT    insulin lispro (HUMALOG) injection   SubCUTAneous AC&HS    insulin glargine (LANTUS) injection 20 Units  20 Units SubCUTAneous QHS    zolpidem (AMBIEN) tablet 5 mg  5 mg Oral QHS PRN    enoxaparin (LOVENOX) injection 40 mg  40 mg SubCUTAneous DAILY       Review of Systems  Constitutional: negative for fever, chills, sweats  Cardiovascular: negative for chest pain, palpitations, syncope, edema  Gastrointestinal:  negative for dysphagia, reflux, vomiting, diarrhea, abdominal pain, or melena  Neurologic:  negative for focal weakness, numbness, headache    Objective:     Vitals:    10/10/17 1915 10/10/17 1922 10/10/17 2257 10/11/17 0408   BP:  145/77 118/72 139/79   Pulse:  79 71 83   Resp:  18 18 17   Temp:  99.1 °F (37.3 °C) 97.8 °F (36.6 °C) 98 °F (36.7 °C)   SpO2: 96% 96% 96% 95%   Weight:       Height:         Intake and Output:   10/09 1901 - 10/11 0700  In: 720 [P.O.:720]  Out: 1525 [Urine:1525]       Physical Exam:   Constitution:  the patient is well developed and in no acute distress, room air sat 95%  EENMT:  Sclera clear, pupils equal, oral mucosa moist  Respiratory: diminished in right base, no wheezing  Cardiovascular:  RRR without M,G,R  Gastrointestinal: soft and non-tender; with positive bowel sounds. Musculoskeletal: warm without cyanosis. There is no lower leg edema. Skin:  no jaundice or rashes, no wounds   Neurologic: no gross neuro deficits     Psychiatric:  alert and oriented x 3    Chest CT 10/3:  1.  Right pleural effusion which appears partially loculated and complex with components of relative increased density which could represent transudative material. There are adjacent airspace opacities, moderate within the right lower lobe which could represent adjacent pneumonia or atelectasis. 2. Mildly prominent subcarinal and right hilar lymph nodes, presumably reactive. CXR: None today      LAB  Recent Labs      10/11/17   0621  10/10/17   2038  10/10/17   1515   GLUCPOC  268*  180*  220*      Recent Labs      10/10/17   1652   WBC  9.7   HGB  13.2*   HCT  38.3*   PLT  452*     Recent Labs      10/10/17   1652   NA  136   K  4.3   CL  102   CO2  28   GLU  139*   BUN  18   CREA  1.04   CA  9.5     No results for input(s): PH, PCO2, PO2, HCO3 in the last 72 hours. No results for input(s): LCAD, LAC in the last 72 hours. Assessment:  (Medical Decision Making)     Hospital Problems  Date Reviewed: 10/11/2017          Codes Class Noted POA    Type II diabetes mellitus with nephropathy ([facility] (Chronic) ICD-10-CM: E11.21  ICD-9-CM: 250.40, 583.81  10/11/2017 Yes    Chronic--ranges 180-268    Hypothyroidism (Chronic) ICD-10-CM: E03.9  ICD-9-CM: 244.9  10/10/2017 Yes    On supplement    * (Principal)Pleural effusion on right ICD-10-CM: J90  ICD-9-CM: 511.9  10/10/2017 Yes    Surgery following    Empyema [facility]) ICD-10-CM: J86.9  ICD-9-CM: 510.9  10/10/2017 Yes    Per surgery--on antibiotics          Plan:  (Medical Decision Making)     --Albuterol, Pulmicort, Singulair  --Zosyn day 2  --Blood cultures:  Pending  --Pleural fluid:  Pending   --WBC down to 9.7  --Surgery following for right thoracotomy for decortication. NPO till surgery schedule timing known. More than 50% of the time documented was spent in face-to-face contact with the patient and in the care of the patient on the floor/unit where the patient is located. Kenn Grace, PATEL    Lungs:  Decreased BS   Heart:  RRR with no Murmur/Rubs/Gallops    Additional Comments:  Stable with plans for OR tomorrow. I have spoken with and examined the patient.  I agree with the above assessment and plan as documented.     Tarsha Blankenship MD

## 2017-10-11 NOTE — PROGRESS NOTES
Spoke with Dr. Roxana Yancey. Plan for surgery Thursday 10-12-17. Received verbal order to feed patient, give insulins and blood thinners.

## 2017-10-12 ENCOUNTER — APPOINTMENT (OUTPATIENT)
Dept: GENERAL RADIOLOGY | Age: 67
DRG: 165 | End: 2017-10-12
Attending: SURGERY
Payer: COMMERCIAL

## 2017-10-12 ENCOUNTER — ANESTHESIA (OUTPATIENT)
Dept: SURGERY | Age: 67
DRG: 165 | End: 2017-10-12
Payer: COMMERCIAL

## 2017-10-12 LAB
ABO + RH BLD: NORMAL
ARTERIAL PATENCY WRIST A: ABNORMAL
BASE DEFICIT BLDA-SCNC: 4 MMOL/L (ref 0–2)
BDY SITE: ABNORMAL
BLOOD GROUP ANTIBODIES SERPL: NORMAL
CA-I BLD-SCNC: 1.2 MMOL/L (ref 1–1.3)
CHLORIDE BLDA-SCNC: 99 MMOL/L (ref 98–106)
COHGB MFR BLD: 0.7 % (ref 0.5–1.5)
DO-HGB BLD-MCNC: 2 % (ref 0–5)
GAS FLOW.O2 O2 DELIVERY SYS: 4 L/MIN
GLUCOSE BLD STRIP.AUTO-MCNC: 193 MG/DL (ref 65–100)
GLUCOSE BLD STRIP.AUTO-MCNC: 218 MG/DL (ref 65–100)
GLUCOSE BLD STRIP.AUTO-MCNC: 244 MG/DL (ref 65–100)
GLUCOSE BLD STRIP.AUTO-MCNC: 269 MG/DL (ref 65–100)
GLUCOSE BLD STRIP.AUTO-MCNC: 281 MG/DL (ref 65–100)
GLUCOSE BLD STRIP.AUTO-MCNC: 288 MG/DL (ref 65–100)
GLUCOSE BLD STRIP.AUTO-MCNC: 294 MG/DL (ref 65–100)
HCO3 BLDA-SCNC: 22 MMOL/L (ref 22–26)
HGB BLDMV-MCNC: 13.9 GM/DL (ref 11.7–15)
MAGNESIUM SERPL-MCNC: 2.2 MG/DL (ref 1.8–2.4)
METHGB MFR BLD: 0.1 % (ref 0–1.5)
OXYHGB MFR BLDA: 97 % (ref 94–97)
PCO2 BLDA: 41 MMHG (ref 35–45)
PH BLDA: 7.34 [PH] (ref 7.35–7.45)
PO2 BLDA: 120 MMHG (ref 80–105)
POTASSIUM BLDA-SCNC: 5.34 MMOL/L (ref 3.5–5.3)
SAO2 % BLD: 98 % (ref 92–98.5)
SERVICE CMNT-IMP: ABNORMAL
SODIUM BLDA-SCNC: 130.1 MMOL/L (ref 135–148)
SPECIMEN EXP DATE BLD: NORMAL
VENTILATION MODE VENT: ABNORMAL

## 2017-10-12 PROCEDURE — 77030011640 HC PAD GRND REM COVD -A: Performed by: SURGERY

## 2017-10-12 PROCEDURE — 74011250636 HC RX REV CODE- 250/636

## 2017-10-12 PROCEDURE — 74011250637 HC RX REV CODE- 250/637: Performed by: NURSE PRACTITIONER

## 2017-10-12 PROCEDURE — 77030005401 HC CATH RAD ARRO -A: Performed by: ANESTHESIOLOGY

## 2017-10-12 PROCEDURE — 76210000016 HC OR PH I REC 1 TO 1.5 HR: Performed by: SURGERY

## 2017-10-12 PROCEDURE — 65610000001 HC ROOM ICU GENERAL

## 2017-10-12 PROCEDURE — 77030020407 HC IV BLD WRMR ST 3M -A: Performed by: ANESTHESIOLOGY

## 2017-10-12 PROCEDURE — 77030009965 HC RELD STPLR ENDOS COVD -D: Performed by: SURGERY

## 2017-10-12 PROCEDURE — C2618 PROBE/NEEDLE, CRYO: HCPCS | Performed by: SURGERY

## 2017-10-12 PROCEDURE — 01580ZZ DESTRUCTION OF THORACIC NERVE, OPEN APPROACH: ICD-10-PCS | Performed by: SURGERY

## 2017-10-12 PROCEDURE — 0BNK0ZZ RELEASE RIGHT LUNG, OPEN APPROACH: ICD-10-PCS | Performed by: SURGERY

## 2017-10-12 PROCEDURE — 74011000250 HC RX REV CODE- 250

## 2017-10-12 PROCEDURE — 77030020782 HC GWN BAIR PAWS FLX 3M -B: Performed by: ANESTHESIOLOGY

## 2017-10-12 PROCEDURE — 76010000172 HC OR TIME 2.5 TO 3 HR INTENSV-TIER 1: Performed by: SURGERY

## 2017-10-12 PROCEDURE — 74011636637 HC RX REV CODE- 636/637: Performed by: INTERNAL MEDICINE

## 2017-10-12 PROCEDURE — 77030019938 HC TBNG IV PCA ICUM -A

## 2017-10-12 PROCEDURE — 77030002996 HC SUT SLK J&J -A: Performed by: SURGERY

## 2017-10-12 PROCEDURE — 77030013292 HC BOWL MX PRSM J&J -A: Performed by: ANESTHESIOLOGY

## 2017-10-12 PROCEDURE — 77030037378 HC BRONCHOSCOPE DISP TRAN -D: Performed by: ANESTHESIOLOGY

## 2017-10-12 PROCEDURE — 86900 BLOOD TYPING SEROLOGIC ABO: CPT | Performed by: ANESTHESIOLOGY

## 2017-10-12 PROCEDURE — 77030008671 HC TU ENDO/BRNC CUF COVD -B: Performed by: ANESTHESIOLOGY

## 2017-10-12 PROCEDURE — 74011636637 HC RX REV CODE- 636/637: Performed by: ANESTHESIOLOGY

## 2017-10-12 PROCEDURE — 77030002966 HC SUT PDS J&J -A: Performed by: SURGERY

## 2017-10-12 PROCEDURE — 77030013794 HC KT TRNSDUC BLD EDWD -B: Performed by: ANESTHESIOLOGY

## 2017-10-12 PROCEDURE — 99232 SBSQ HOSP IP/OBS MODERATE 35: CPT | Performed by: INTERNAL MEDICINE

## 2017-10-12 PROCEDURE — 77030032490 HC SLV COMPR SCD KNE COVD -B: Performed by: SURGERY

## 2017-10-12 PROCEDURE — 77030022473 HC HNDL ENDO GIA UNIV USDA -C: Performed by: SURGERY

## 2017-10-12 PROCEDURE — 88307 TISSUE EXAM BY PATHOLOGIST: CPT | Performed by: SURGERY

## 2017-10-12 PROCEDURE — 0BBF0ZZ EXCISION OF RIGHT LOWER LUNG LOBE, OPEN APPROACH: ICD-10-PCS | Performed by: SURGERY

## 2017-10-12 PROCEDURE — 36415 COLL VENOUS BLD VENIPUNCTURE: CPT | Performed by: ANESTHESIOLOGY

## 2017-10-12 PROCEDURE — 88305 TISSUE EXAM BY PATHOLOGIST: CPT | Performed by: SURGERY

## 2017-10-12 PROCEDURE — 76060000036 HC ANESTHESIA 2.5 TO 3 HR: Performed by: SURGERY

## 2017-10-12 PROCEDURE — 74011250636 HC RX REV CODE- 250/636: Performed by: SURGERY

## 2017-10-12 PROCEDURE — 77030005424 HC CATH THOR GTNG -A: Performed by: SURGERY

## 2017-10-12 PROCEDURE — 74011250637 HC RX REV CODE- 250/637: Performed by: SURGERY

## 2017-10-12 PROCEDURE — 77030034850: Performed by: SURGERY

## 2017-10-12 PROCEDURE — 82962 GLUCOSE BLOOD TEST: CPT

## 2017-10-12 PROCEDURE — 77030018836 HC SOL IRR NACL ICUM -A: Performed by: SURGERY

## 2017-10-12 PROCEDURE — 74011000258 HC RX REV CODE- 258

## 2017-10-12 PROCEDURE — 71010 XR CHEST PORT: CPT

## 2017-10-12 PROCEDURE — 77030031139 HC SUT VCRL2 J&J -A: Performed by: SURGERY

## 2017-10-12 PROCEDURE — 74011000250 HC RX REV CODE- 250: Performed by: INTERNAL MEDICINE

## 2017-10-12 PROCEDURE — 77030019908 HC STETH ESOPH SIMS -A: Performed by: ANESTHESIOLOGY

## 2017-10-12 PROCEDURE — 77030019940 HC BLNKT HYPOTHRM STRY -B: Performed by: ANESTHESIOLOGY

## 2017-10-12 PROCEDURE — 82803 BLOOD GASES ANY COMBINATION: CPT

## 2017-10-12 PROCEDURE — 74011250636 HC RX REV CODE- 250/636: Performed by: ANESTHESIOLOGY

## 2017-10-12 PROCEDURE — 77030018673: Performed by: SURGERY

## 2017-10-12 PROCEDURE — 74011000258 HC RX REV CODE- 258: Performed by: NURSE PRACTITIONER

## 2017-10-12 PROCEDURE — 83735 ASSAY OF MAGNESIUM: CPT | Performed by: SURGERY

## 2017-10-12 PROCEDURE — 74011250636 HC RX REV CODE- 250/636: Performed by: NURSE PRACTITIONER

## 2017-10-12 PROCEDURE — C1729 CATH, DRAINAGE: HCPCS | Performed by: SURGERY

## 2017-10-12 PROCEDURE — 77030008467 HC STPLR SKN COVD -B: Performed by: SURGERY

## 2017-10-12 RX ORDER — NALOXONE HYDROCHLORIDE 0.4 MG/ML
0.2 INJECTION, SOLUTION INTRAMUSCULAR; INTRAVENOUS; SUBCUTANEOUS AS NEEDED
Status: DISCONTINUED | OUTPATIENT
Start: 2017-10-12 | End: 2017-10-12 | Stop reason: HOSPADM

## 2017-10-12 RX ORDER — DEXAMETHASONE SODIUM PHOSPHATE 4 MG/ML
INJECTION, SOLUTION INTRA-ARTICULAR; INTRALESIONAL; INTRAMUSCULAR; INTRAVENOUS; SOFT TISSUE AS NEEDED
Status: DISCONTINUED | OUTPATIENT
Start: 2017-10-12 | End: 2017-10-12 | Stop reason: HOSPADM

## 2017-10-12 RX ORDER — LIDOCAINE HYDROCHLORIDE 20 MG/ML
INJECTION, SOLUTION EPIDURAL; INFILTRATION; INTRACAUDAL; PERINEURAL AS NEEDED
Status: DISCONTINUED | OUTPATIENT
Start: 2017-10-12 | End: 2017-10-12 | Stop reason: HOSPADM

## 2017-10-12 RX ORDER — PROPOFOL 10 MG/ML
INJECTION, EMULSION INTRAVENOUS AS NEEDED
Status: DISCONTINUED | OUTPATIENT
Start: 2017-10-12 | End: 2017-10-12 | Stop reason: HOSPADM

## 2017-10-12 RX ORDER — ROCURONIUM BROMIDE 10 MG/ML
INJECTION, SOLUTION INTRAVENOUS AS NEEDED
Status: DISCONTINUED | OUTPATIENT
Start: 2017-10-12 | End: 2017-10-12 | Stop reason: HOSPADM

## 2017-10-12 RX ORDER — SODIUM CHLORIDE 0.9 % (FLUSH) 0.9 %
5-10 SYRINGE (ML) INJECTION AS NEEDED
Status: DISCONTINUED | OUTPATIENT
Start: 2017-10-12 | End: 2017-10-12 | Stop reason: HOSPADM

## 2017-10-12 RX ORDER — FENTANYL CITRATE 50 UG/ML
INJECTION, SOLUTION INTRAMUSCULAR; INTRAVENOUS AS NEEDED
Status: DISCONTINUED | OUTPATIENT
Start: 2017-10-12 | End: 2017-10-12 | Stop reason: HOSPADM

## 2017-10-12 RX ORDER — NEOSTIGMINE METHYLSULFATE 1 MG/ML
INJECTION INTRAVENOUS AS NEEDED
Status: DISCONTINUED | OUTPATIENT
Start: 2017-10-12 | End: 2017-10-12 | Stop reason: HOSPADM

## 2017-10-12 RX ORDER — ONDANSETRON 2 MG/ML
INJECTION INTRAMUSCULAR; INTRAVENOUS AS NEEDED
Status: DISCONTINUED | OUTPATIENT
Start: 2017-10-12 | End: 2017-10-12 | Stop reason: HOSPADM

## 2017-10-12 RX ORDER — FAMOTIDINE 20 MG/1
20 TABLET, FILM COATED ORAL ONCE
Status: DISCONTINUED | OUTPATIENT
Start: 2017-10-12 | End: 2017-10-12 | Stop reason: HOSPADM

## 2017-10-12 RX ORDER — SODIUM CHLORIDE, SODIUM LACTATE, POTASSIUM CHLORIDE, CALCIUM CHLORIDE 600; 310; 30; 20 MG/100ML; MG/100ML; MG/100ML; MG/100ML
INJECTION, SOLUTION INTRAVENOUS
Status: DISCONTINUED | OUTPATIENT
Start: 2017-10-12 | End: 2017-10-12 | Stop reason: HOSPADM

## 2017-10-12 RX ORDER — GABAPENTIN 100 MG/1
200 CAPSULE ORAL 3 TIMES DAILY
Status: DISCONTINUED | OUTPATIENT
Start: 2017-10-12 | End: 2017-10-15 | Stop reason: HOSPADM

## 2017-10-12 RX ORDER — LIDOCAINE HYDROCHLORIDE 10 MG/ML
0.1 INJECTION INFILTRATION; PERINEURAL AS NEEDED
Status: DISCONTINUED | OUTPATIENT
Start: 2017-10-12 | End: 2017-10-12 | Stop reason: HOSPADM

## 2017-10-12 RX ORDER — OXYCODONE AND ACETAMINOPHEN 5; 325 MG/1; MG/1
1 TABLET ORAL AS NEEDED
Status: DISCONTINUED | OUTPATIENT
Start: 2017-10-12 | End: 2017-10-12 | Stop reason: HOSPADM

## 2017-10-12 RX ORDER — HYDROMORPHONE HYDROCHLORIDE 2 MG/ML
0.5 INJECTION, SOLUTION INTRAMUSCULAR; INTRAVENOUS; SUBCUTANEOUS
Status: DISCONTINUED | OUTPATIENT
Start: 2017-10-12 | End: 2017-10-12 | Stop reason: HOSPADM

## 2017-10-12 RX ORDER — SODIUM CHLORIDE, SODIUM LACTATE, POTASSIUM CHLORIDE, CALCIUM CHLORIDE 600; 310; 30; 20 MG/100ML; MG/100ML; MG/100ML; MG/100ML
75 INJECTION, SOLUTION INTRAVENOUS CONTINUOUS
Status: DISCONTINUED | OUTPATIENT
Start: 2017-10-12 | End: 2017-10-12 | Stop reason: HOSPADM

## 2017-10-12 RX ORDER — SUCCINYLCHOLINE CHLORIDE 20 MG/ML
INJECTION INTRAMUSCULAR; INTRAVENOUS AS NEEDED
Status: DISCONTINUED | OUTPATIENT
Start: 2017-10-12 | End: 2017-10-12

## 2017-10-12 RX ORDER — MIDAZOLAM HYDROCHLORIDE 1 MG/ML
2 INJECTION, SOLUTION INTRAMUSCULAR; INTRAVENOUS
Status: DISCONTINUED | OUTPATIENT
Start: 2017-10-12 | End: 2017-10-12 | Stop reason: HOSPADM

## 2017-10-12 RX ORDER — ONDANSETRON 2 MG/ML
4 INJECTION INTRAMUSCULAR; INTRAVENOUS
Status: DISCONTINUED | OUTPATIENT
Start: 2017-10-12 | End: 2017-10-15 | Stop reason: HOSPADM

## 2017-10-12 RX ORDER — KETOROLAC TROMETHAMINE 15 MG/ML
15 INJECTION, SOLUTION INTRAMUSCULAR; INTRAVENOUS EVERY 6 HOURS
Status: COMPLETED | OUTPATIENT
Start: 2017-10-12 | End: 2017-10-14

## 2017-10-12 RX ORDER — GLYCOPYRROLATE 0.2 MG/ML
INJECTION INTRAMUSCULAR; INTRAVENOUS AS NEEDED
Status: DISCONTINUED | OUTPATIENT
Start: 2017-10-12 | End: 2017-10-12 | Stop reason: HOSPADM

## 2017-10-12 RX ORDER — ENOXAPARIN SODIUM 100 MG/ML
40 INJECTION SUBCUTANEOUS EVERY 24 HOURS
Status: DISCONTINUED | OUTPATIENT
Start: 2017-10-12 | End: 2017-10-15 | Stop reason: HOSPADM

## 2017-10-12 RX ADMIN — ROCURONIUM BROMIDE 10 MG: 10 INJECTION, SOLUTION INTRAVENOUS at 11:00

## 2017-10-12 RX ADMIN — MONTELUKAST SODIUM 10 MG: 10 TABLET, FILM COATED ORAL at 21:50

## 2017-10-12 RX ADMIN — LEVOTHYROXINE SODIUM 75 MCG: 75 TABLET ORAL at 06:02

## 2017-10-12 RX ADMIN — PIPERACILLIN SODIUM AND TAZOBACTAM SODIUM 4.5 G: 4; .5 INJECTION, POWDER, LYOPHILIZED, FOR SOLUTION INTRAVENOUS at 11:30

## 2017-10-12 RX ADMIN — Medication 10 ML: at 15:21

## 2017-10-12 RX ADMIN — ROCURONIUM BROMIDE 5 MG: 10 INJECTION, SOLUTION INTRAVENOUS at 11:56

## 2017-10-12 RX ADMIN — PROPOFOL 200 MG: 10 INJECTION, EMULSION INTRAVENOUS at 10:29

## 2017-10-12 RX ADMIN — METOPROLOL TARTRATE 25 MG: 25 TABLET ORAL at 21:50

## 2017-10-12 RX ADMIN — FENTANYL CITRATE 50 MCG: 50 INJECTION, SOLUTION INTRAMUSCULAR; INTRAVENOUS at 13:05

## 2017-10-12 RX ADMIN — SODIUM CHLORIDE, SODIUM LACTATE, POTASSIUM CHLORIDE, CALCIUM CHLORIDE: 600; 310; 30; 20 INJECTION, SOLUTION INTRAVENOUS at 10:43

## 2017-10-12 RX ADMIN — Medication 1 AMPULE: at 21:50

## 2017-10-12 RX ADMIN — INSULIN GLARGINE 20 UNITS: 100 INJECTION, SOLUTION SUBCUTANEOUS at 21:46

## 2017-10-12 RX ADMIN — SIMVASTATIN 40 MG: 40 TABLET, FILM COATED ORAL at 21:50

## 2017-10-12 RX ADMIN — INSULIN LISPRO 6 UNITS: 100 INJECTION, SOLUTION INTRAVENOUS; SUBCUTANEOUS at 16:35

## 2017-10-12 RX ADMIN — INSULIN LISPRO 6 UNITS: 100 INJECTION, SOLUTION INTRAVENOUS; SUBCUTANEOUS at 06:03

## 2017-10-12 RX ADMIN — ROCURONIUM BROMIDE 10 MG: 10 INJECTION, SOLUTION INTRAVENOUS at 11:17

## 2017-10-12 RX ADMIN — Medication 10 ML: at 21:50

## 2017-10-12 RX ADMIN — GLYCOPYRROLATE 0.4 MG: 0.2 INJECTION INTRAMUSCULAR; INTRAVENOUS at 12:47

## 2017-10-12 RX ADMIN — INSULIN HUMAN 15 UNITS: 100 INJECTION, SOLUTION PARENTERAL at 13:33

## 2017-10-12 RX ADMIN — HYDROMORPHONE HYDROCHLORIDE 0.5 MG: 2 INJECTION, SOLUTION INTRAMUSCULAR; INTRAVENOUS; SUBCUTANEOUS at 13:30

## 2017-10-12 RX ADMIN — HYDROMORPHONE HYDROCHLORIDE 0.5 MG: 2 INJECTION, SOLUTION INTRAMUSCULAR; INTRAVENOUS; SUBCUTANEOUS at 13:25

## 2017-10-12 RX ADMIN — FENTANYL CITRATE 100 MCG: 50 INJECTION, SOLUTION INTRAMUSCULAR; INTRAVENOUS at 11:02

## 2017-10-12 RX ADMIN — Medication 5 ML: at 06:03

## 2017-10-12 RX ADMIN — ROCURONIUM BROMIDE 50 MG: 10 INJECTION, SOLUTION INTRAVENOUS at 10:30

## 2017-10-12 RX ADMIN — HYDROMORPHONE HYDROCHLORIDE: 2 INJECTION INTRAMUSCULAR; INTRAVENOUS; SUBCUTANEOUS at 15:26

## 2017-10-12 RX ADMIN — INSULIN LISPRO 4 UNITS: 100 INJECTION, SOLUTION INTRAVENOUS; SUBCUTANEOUS at 21:47

## 2017-10-12 RX ADMIN — NEOSTIGMINE METHYLSULFATE 3 MG: 1 INJECTION INTRAVENOUS at 12:47

## 2017-10-12 RX ADMIN — KETOROLAC TROMETHAMINE 15 MG: 15 INJECTION, SOLUTION INTRAMUSCULAR; INTRAVENOUS at 19:51

## 2017-10-12 RX ADMIN — PIPERACILLIN SODIUM AND TAZOBACTAM SODIUM 4.5 G: 4; .5 INJECTION, POWDER, LYOPHILIZED, FOR SOLUTION INTRAVENOUS at 00:44

## 2017-10-12 RX ADMIN — PANTOPRAZOLE SODIUM 40 MG: 40 TABLET, DELAYED RELEASE ORAL at 06:02

## 2017-10-12 RX ADMIN — ROCURONIUM BROMIDE 5 MG: 10 INJECTION, SOLUTION INTRAVENOUS at 12:15

## 2017-10-12 RX ADMIN — ROCURONIUM BROMIDE 10 MG: 10 INJECTION, SOLUTION INTRAVENOUS at 11:32

## 2017-10-12 RX ADMIN — DEXAMETHASONE SODIUM PHOSPHATE 4 MG: 4 INJECTION, SOLUTION INTRA-ARTICULAR; INTRALESIONAL; INTRAMUSCULAR; INTRAVENOUS; SOFT TISSUE at 11:42

## 2017-10-12 RX ADMIN — GABAPENTIN 200 MG: 100 CAPSULE ORAL at 21:50

## 2017-10-12 RX ADMIN — HYDROCODONE BITARTRATE AND ACETAMINOPHEN 1 TABLET: 7.5; 325 TABLET ORAL at 16:35

## 2017-10-12 RX ADMIN — HYDROMORPHONE HYDROCHLORIDE 0.5 MG: 2 INJECTION, SOLUTION INTRAMUSCULAR; INTRAVENOUS; SUBCUTANEOUS at 13:40

## 2017-10-12 RX ADMIN — ENOXAPARIN SODIUM 40 MG: 40 INJECTION SUBCUTANEOUS at 16:35

## 2017-10-12 RX ADMIN — PIPERACILLIN SODIUM AND TAZOBACTAM SODIUM 4.5 G: 4; .5 INJECTION, POWDER, LYOPHILIZED, FOR SOLUTION INTRAVENOUS at 15:20

## 2017-10-12 RX ADMIN — SODIUM CHLORIDE, SODIUM LACTATE, POTASSIUM CHLORIDE, AND CALCIUM CHLORIDE 75 ML/HR: 600; 310; 30; 20 INJECTION, SOLUTION INTRAVENOUS at 08:38

## 2017-10-12 RX ADMIN — FENTANYL CITRATE 50 MCG: 50 INJECTION, SOLUTION INTRAMUSCULAR; INTRAVENOUS at 13:11

## 2017-10-12 RX ADMIN — INSULIN HUMAN 4 UNITS: 100 INJECTION, SOLUTION PARENTERAL at 08:55

## 2017-10-12 RX ADMIN — METOPROLOL TARTRATE 25 MG: 25 TABLET ORAL at 06:05

## 2017-10-12 RX ADMIN — ONDANSETRON 4 MG: 2 INJECTION INTRAMUSCULAR; INTRAVENOUS at 11:42

## 2017-10-12 RX ADMIN — FENTANYL CITRATE 100 MCG: 50 INJECTION, SOLUTION INTRAMUSCULAR; INTRAVENOUS at 10:29

## 2017-10-12 RX ADMIN — LIDOCAINE HYDROCHLORIDE 50 MG: 20 INJECTION, SOLUTION EPIDURAL; INFILTRATION; INTRACAUDAL; PERINEURAL at 10:29

## 2017-10-12 NOTE — PROGRESS NOTES
Lopressor, protonix , and synthroid as ordered given po with sips of water as instructed by Pre-op. Type and screen drawn as ordered. Pt alert and oriented without changes.

## 2017-10-12 NOTE — PROGRESS NOTES
Return call from Dr. Buster Hu. MD made aware of OSLE=[id number] and that pt NPO after MN for AM surgery. MD made aware that pt is scheduled for half of his home dose of lantus here. Takes 40units Lantus at home with  20 units ordered tonight. MD states to give top of sliding scale which is 10 units Humalog SQ and Lantus 20 units SQ tonight. Meds given as ordered. Pt aware to be NPO after MN.

## 2017-10-12 NOTE — PROGRESS NOTES
Bedside and Verbal shift change report given to Ry Bradley RN (oncoming nurse) by KAJAL Neff (offgoing nurse). Report included the following information SBAR, Kardex, ED Summary, OR Summary, Intake/Output, MAR, Recent Results and Cardiac Rhythm SR. Patient resting quietly watching tv. Right CT sites are clean, dry, and intact; draining sanguinous fluids. PCA verified via MAR.

## 2017-10-12 NOTE — PROGRESS NOTES
Nico Paul  Admission Date: 10/10/2017             Daily Progress Note: 10/12/2017    The patient's chart is reviewed and the patient is discussed with the staff. 73 y.o. CLARISSA presents with a history of asthma and previously been seen in our office 6-8 years ago. Was seen by his PCP, Dr. Jak Thakkar, on Sept 25th with cough, wheezing, dyspnea and right sided pleuritic chest pain. His CXR then was reportedly clear. He was treated for suspected bronchitis with Rocephin and Levaquin. Was seen 10/3 with ongoing chest discomfort and CXR showed a right pleural effusion and chest CT scan which confirmed. WBC was elevated 16.1 and he received 2 more daily doses of IM Rocephin and additional 10 day course of Levaquin. He was referred to [facility] for thoracentesis. Had ongoing chest pain, productive cough with yellow sputum, fatigue, lack of energy and shortness of breath. Right thoracentesis attempted with 5ml juarez pus and no additional fluid could be removed. General surgery was consulted for empyema. Subjective:     Up in room, denies shortness of breath. Ambulating in room and remains on room air. No productive cough. NPO for possible surgery today.     Current Facility-Administered Medications   Medication Dose Route Frequency    alum-mag hydroxide-simeth (MYLANTA) oral suspension 30 mL  30 mL Oral Q4H PRN    levothyroxine (SYNTHROID) tablet 75 mcg  75 mcg Oral 6am    metoprolol tartrate (LOPRESSOR) tablet 25 mg  25 mg Oral Q12H    montelukast (SINGULAIR) tablet 10 mg  10 mg Oral QHS    simvastatin (ZOCOR) tablet 40 mg  40 mg Oral QHS    sodium chloride (NS) flush 5-10 mL  5-10 mL IntraVENous Q8H    sodium chloride (NS) flush 5-10 mL  5-10 mL IntraVENous PRN    HYDROcodone-acetaminophen (NORCO) 7.5-325 mg per tablet 1 Tab  1 Tab Oral Q4H PRN    piperacillin-tazobactam (ZOSYN) 4.5 g in 0.9% sodium chloride (MBP/ADV) 100 mL  4.5 g IntraVENous Q8H    acetaminophen (TYLENOL) tablet 650 mg  650 mg Oral Q6H PRN    pantoprazole (PROTONIX) tablet 40 mg  40 mg Oral ACB    albuterol (PROVENTIL VENTOLIN) nebulizer solution 2.5 mg  2.5 mg Nebulization Q6H PRN    budesonide (PULMICORT) 500 mcg/2 ml nebulizer suspension  500 mcg Nebulization BID RT    And    albuterol CONCENTRATE 2.5mg/0.5 mL neb soln  2.5 mg Nebulization Q6H RT    insulin lispro (HUMALOG) injection   SubCUTAneous AC&HS    insulin glargine (LANTUS) injection 20 Units  20 Units SubCUTAneous QHS    zolpidem (AMBIEN) tablet 5 mg  5 mg Oral QHS PRN       Review of Systems  Constitutional: negative for fever, chills, sweats  Cardiovascular: negative for chest pain, palpitations, syncope, edema  Gastrointestinal:  negative for dysphagia, reflux, vomiting, diarrhea, abdominal pain, or melena  Neurologic:  negative for focal weakness, numbness, headache    Objective:     Vitals:    10/11/17 2142 10/11/17 2300 10/12/17 0352 10/12/17 0605   BP:  143/75 (!) 152/92 137/81   Pulse: 84 81 78 72   Resp:  18 18    Temp:  99.3 °F (37.4 °C) 98.1 °F (36.7 °C)    SpO2:  96% 96%    Weight:       Height:         Intake and Output:   10/10 0701 - 10/11 1900  In: 1200 [P.O.:1200]  Out: 5240 [Urine:2675]  10/11 1901 - 10/12 0700  In: -   Out: 400 [Urine:400]    Physical Exam:   Constitution:  the patient is well developed and in no acute distress, room air sat 96%  EENMT:  Sclera clear, pupils equal, oral mucosa moist  Respiratory: diminished in right base, no wheezing  Cardiovascular:  RRR without M,G,R  Gastrointestinal: soft and non-tender; with positive bowel sounds. Musculoskeletal: warm without cyanosis. There is no lower leg edema. Skin:  no jaundice or rashes, no wounds   Neurologic: no gross neuro deficits     Psychiatric:  alert and oriented x 3    Chest CT 10/3:  1.  Right pleural effusion which appears partially loculated and complex with components of relative increased density which could represent transudative material. There are adjacent airspace opacities, moderate within the right lower lobe which could represent adjacent pneumonia or atelectasis. 2. Mildly prominent subcarinal and right hilar lymph nodes, presumably reactive. CXR: None today      LAB  Recent Labs      10/12/17   0514  10/11/17   2111  10/11/17   1548  10/11/17   1051  10/11/17   0832   GLUCPOC  294*  403*  298*  292*  299*      Recent Labs      10/10/17   1652   WBC  9.7   HGB  13.2*   HCT  38.3*   PLT  452*     Recent Labs      10/10/17   1652   NA  136   K  4.3   CL  102   CO2  28   GLU  139*   BUN  18   CREA  1.04   CA  9.5     No results for input(s): PH, PCO2, PO2, HCO3 in the last 72 hours. No results for input(s): LCAD, LAC in the last 72 hours. Assessment:  (Medical Decision Making)     Hospital Problems  Date Reviewed: 10/12/2017          Codes Class Noted POA    Type II diabetes mellitus with nephropathy ([address] (Chronic) ICD-10-CM: E11.21  ICD-9-CM: 250.40, 583.81  10/11/2017 Yes    Chronic--ranges 292-403    Hypothyroidism (Chronic) ICD-10-CM: E03.9  ICD-9-CM: 244.9  10/10/2017 Yes    On supplement    * (Principal)Pleural effusion on right ICD-10-CM: J90  ICD-9-CM: 511.9  10/10/2017 Yes    Surgery today    [facility]) ICD-10-CM: J86.9  ICD-9-CM: 510.9  10/10/2017 Yes    Per surgery--on antibiotics          Plan:  (Medical Decision Making)     --Albuterol, Pulmicort, Singulair  --Zosyn day 3  --Blood cultures:  Pending  --Pleural fluid:  Pending   --Surgery following for right thoracotomy for decortication. Surgery today. More than 50% of the time documented was spent in face-to-face contact with the patient and in the care of the patient on the floor/unit where the patient is located. Frederick Escobar NP            Lungs:  coarse  Heart:  RRR with no Murmur/Rubs/Gallops    Additional Comments:  Going to OR today     I have spoken with and examined the patient. I agree with the above assessment and plan as documented.     Marilou Barbour Enid Hartman MD

## 2017-10-12 NOTE — BRIEF OP NOTE
BRIEF OPERATIVE NOTE    Date of Procedure: 10/12/2017   Preoperative Diagnosis: Rt empyema [J90]  Postoperative Diagnosis: Rt empyema [J90]    Procedure(s):  RIGHT THORACOTOMY / DECORTICATION  Wedge resection of right lower lobe  NERVE BLOCK OF INTERCOSTAL SPACES WITH CRYOABLATION      Surgeon(s) and Role:     * Delano Amaya MD - Primary         Assistant Staff:       Surgical Staff:  Circ-1: Errol Ross RN  Circ-Relief: Jaden Becker RN  Scrub Tech-1: Morteza Cole  Scrub Tech-2: Kelsi Flannery  Scrub Tech-Relief: Arnulfo Ma  Scrub Tech-3: Ciera Post Jimi  Event Time In   Incision Start 10:55 AM   Incision Close 12:49 PM     Anesthesia: General   Estimated Blood Loss: 100 cc  Specimens:   ID Type Source Tests Collected by Time Destination   1 : Berry Soto MD 10/12/2017 11:44 AM Pathology   2 : RIGHT LOWER LOBE Alejandro Kovacs MD 10/12/2017  1:15 PM Pathology      Findings: right organizing empyema, mainly involving the lower lobe  Complications: none  Implants: CT x 2

## 2017-10-12 NOTE — PROGRESS NOTES
TRANSFER - IN REPORT:    Verbal report received from [place], RN(name) on Danny Jason  being received from [employer]) for routine post - op      Report consisted of patients Situation, Background, Assessment and   Recommendations(SBAR). Information from the following report(s) SBAR, OR Summary, Intake/Output, MAR, Recent Results and Cardiac Rhythm NSR was reviewed with the receiving nurse. Opportunity for questions and clarification was provided. Assessment completed upon patients arrival to unit and care assumed.

## 2017-10-12 NOTE — PROGRESS NOTES
Pt resting with his eyes closed. Resp even and unlabored. No distress. IV zosyn infusing without difficulty.

## 2017-10-12 NOTE — PERIOP NOTES
TRANSFER - OUT REPORT:    Verbal report given to Amy RDZ(name) on Duglas Haskins  being transferred to room 3102(unit) for routine post - op       Report consisted of patients Situation, Background, Assessment and   Recommendations(SBAR). Information from the following report(s) SBAR, Kardex, OR Summary, Intake/Output and MAR was reviewed with the receiving nurse. Lines:   Peripheral IV 10/10/17 Right Hand (Active)   Site Assessment Clean, dry, & intact 10/12/2017  1:07 PM   Phlebitis Assessment 0 10/12/2017  1:07 PM   Infiltration Assessment 0 10/12/2017  1:07 PM   Dressing Status Clean, dry, & intact 10/12/2017  1:07 PM   Dressing Type Transparent;Tape 10/12/2017  1:07 PM   Hub Color/Line Status Pink; Infusing 10/12/2017  1:07 PM   Alcohol Cap Used No 10/11/2017  1:02 AM       Peripheral IV 10/12/17 Left Forearm (Active)   Site Assessment Clean, dry, & intact 10/12/2017  1:07 PM   Phlebitis Assessment 0 10/12/2017  1:07 PM   Infiltration Assessment 0 10/12/2017  1:07 PM   Dressing Status Clean, dry, & intact 10/12/2017  1:07 PM   Dressing Type Transparent;Tape 10/12/2017  1:07 PM   Hub Color/Line Status Green;Flushed;Capped 10/12/2017  1:07 PM       Arterial Line 10/12/17 Left Radial artery (Active)   Site Assessment Clean, dry, & intact 10/12/2017  1:07 PM   Dressing Status Clean, dry, & intact 10/12/2017  1:07 PM   Dressing Type Transparent;Tape 10/12/2017  1:07 PM   Line Status Intact and in place 10/12/2017  1:07 PM   Affected Extremity/Extremities Color distal to insertion site pink (or appropriate for race) 10/12/2017  1:07 PM        Opportunity for questions and clarification was provided. Patient transported with:   O2 @ 2 liters    VTE prophylaxis orders have been written for Duglas Haskins. Patient and family given floor number and nurses name. Family updated re: pt status after security code verified.

## 2017-10-12 NOTE — ANESTHESIA PROCEDURE NOTES
Arterial Line Placement    Start time: 10/12/2017 9:23 AM  End time: 10/12/2017 9:25 AM  Performed by: Shona Cedeno  Authorized by: Alee PARRY     Pre-Procedure  Indications:  Arterial pressure monitoring and blood sampling  Preanesthetic Checklist: patient identified, risks and benefits discussed, anesthesia consent, site marked, patient being monitored, timeout performed and patient being monitored    Timeout Time: 09:23        Procedure:   Prep:  Chlorhexidine  Seldinger Technique?: No    Orientation:  Left  Location:  Radial artery  Catheter size:  20 G  Number of attempts:  1  Cont Cardiac Output Sensor: No      Assessment:   Post-procedure:  Line secured and sterile dressing applied  Patient Tolerance:  Patient tolerated the procedure well with no immediate complications  Comment:   Placed by Jarett Banegas

## 2017-10-12 NOTE — PROGRESS NOTES
Patient arrived to 3102 via stretcher from PACU. Patient responds to voice, oriented x 4. Current pain level 3/10. NSR on monitor, 02 sat 100% on 2 L NC. Lung sounds clear bilaterally. Abdomen soft, non-tender, hypoactive bowel sounds. Palpable pedal and radial pulses. CT x 2 with sanguinous drainage. Canas draining clear yellow urine. R radial arterial line in place. Dual skin assessment completed with Jp Velasquez RN. Incision and CT dressings C/D/I to R abdomen/back. Sacrum/buttocks slightly red and blanchable. Small scabbed area to R great toe. Allevyn placed on sacrum for skin protection.

## 2017-10-12 NOTE — ANESTHESIA POSTPROCEDURE EVALUATION
Post-Anesthesia Evaluation and Assessment    Patient: Mela Hopkins MRN: [id number]  SSN: [id number]    YOB: 1950  Age: 73 y.o. Sex: male       Cardiovascular Function/Vital Signs  Visit Vitals    /73    Pulse 77    Temp 36.2 °C (97.1 °F)    Resp 11    Ht 6' (1.829 m)    Wt 88.5 kg (195 lb)    SpO2 100%    BMI 26.45 kg/m2       Patient is status post general anesthesia for Procedure(s):  RIGHT THORACOTOMY / DECORTICATION . NERVE BLOCK OF INTERCOSTAL SPACES WITH CRYOABLATION  . Nausea/Vomiting: None    Postoperative hydration reviewed and adequate. Pain:  Pain Scale 1: Numeric (0 - 10) (10/12/17 1340)  Pain Intensity 1: 7 (10/12/17 1340)   Managed    Neurological Status:   Neuro (WDL): (P) Exceptions to WDL (10/12/17 1307)  Neuro  Neurologic State: (P) Drowsy (10/12/17 1307)  LUE Motor Response: (P) Purposeful (10/12/17 1307)  LLE Motor Response: (P) Purposeful (10/12/17 1307)  RUE Motor Response: (P) Purposeful (10/12/17 1307)  RLE Motor Response: (P) Purposeful (10/12/17 1307)   At baseline    Mental Status and Level of Consciousness: Arousable    Pulmonary Status:   O2 Device: Nasal cannula (10/12/17 1307)   Adequate oxygenation and airway patent    Complications related to anesthesia: None    Post-anesthesia assessment completed.  No concerns    Signed By: Martina Duncan MD     October 12, 2017

## 2017-10-12 NOTE — PROGRESS NOTES
Informed KAJAL Vega in Pre-op of VA=344 with no coverage given and of patient refusing breathing treatment and of Pre-op checklist not done.

## 2017-10-12 NOTE — OP NOTES
[address]   OPERATIVE REPORT       Name:  Mihai Durham   MR#:  [id number]   :  1950   Account #:  [de-identified]   Date of Adm:  10/10/2017       DATE OF SURGERY: 10/12/2017    NAME OF SURGEON: Delano Amaya MD     PREOPERATIVE DIAGNOSIS: Right empyema, recent pneumonia. POSTOPERATIVE DIAGNOSIS: Right empyema, recent pneumonia. NAME OF PROCEDURE:    1. Right thoracotomy with decortication. 2. Wedge resection of right lower lobe. 3. Intercostal nerve block with cryoablation. INDICATIONS: This is a 71-year-old gentleman who presented with   a loculated right pleural effusion consistent with empyema. He   reported 2 weeks' history of pneumonia and his sister actually   reported much longer history of pneumonia. He had an episode as   early as this February. The patient was offered for surgery to   decorticate his empyema. He understood the risks and benefits,   agreed to proceed. FINDINGS: He has an organizing empyema mainly involving the   right lower lobe. The tip of lower lobe was not in good shape   after decortication was done and this was wedged out. PROCEDURE: After informed consent obtained, the patient was   brought into the operating room and left in supine position. General anesthesia was administered. The patient was then placed   a double lumen tube and placed on left decubitus position with   right side up. His right chest was prepped and draped in the   usual routine fashion. A standard posterolateral thoracotomy   incision was made. The latissimus dorsi was divided, the   serratus anterior was retracted anteriorly. Then the #6 rib was   isolated posteriorly and then divided and then the pleural space   was entered. As noted, there was no space in the pleural as it   is almost completely obliterated. With careful dissection, the   space was opened up and a large encapsulated empyema mainly   identified posteriorly, inferiorly to the lower lobe.  With careful dissections, this was mainly blunt and combined with   sharp dissection, the lower lobe was freed up anteriorly from   the pericardium, inferiorly from the diaphragm, and then   posteriorly from the chest wall, and a large amount of loculated   gelatin material was removed. Then the lower lobe was   decorticated and almost complete decortication was able to   obtain. The lung was completely freed up. The edge of the lower   lobe was still indurated and the complete decortication was   deemed to be very difficult. Then with the Endo-WYATT stapler, a   wedge resection of this portion of the lung was performed. Then   the surgical field inspected. Copious irrigation was used and   good hemostasis obtained. The intercostal nerve was ablated with   cryoablation for postop pain control. Then two 36 chest tubes   were placed, 1 to the apex, 1 along the diaphragm. Then the rib   was reapproximated with #2 Vicryl in figure-of-eight fashion. The serratus was reapproximated and the anterior fascia was   closed with 0 Vicryl stitch in running fashion. The skin was   closed with staples. The patient tolerated the procedure well,   transferred to recovery room in stable condition. All the   instrument count and lap counts were correct. Estimated blood   loss was about 100 mL.         Albin Jennings MD      BY / Fely Morgan   D:  10/12/2017   13:44   T:  10/12/2017   14:12   Job #:  [id number]

## 2017-10-13 ENCOUNTER — APPOINTMENT (OUTPATIENT)
Dept: GENERAL RADIOLOGY | Age: 67
DRG: 165 | End: 2017-10-13
Attending: SURGERY
Payer: COMMERCIAL

## 2017-10-13 LAB
ANION GAP SERPL CALC-SCNC: 10 MMOL/L (ref 7–16)
ANION GAP SERPL CALC-SCNC: 11 MMOL/L (ref 7–16)
BUN SERPL-MCNC: 18 MG/DL (ref 8–23)
BUN SERPL-MCNC: 29 MG/DL (ref 8–23)
CALCIUM SERPL-MCNC: 9.1 MG/DL (ref 8.3–10.4)
CHLORIDE SERPL-SCNC: 121 MMOL/L (ref 98–107)
CHLORIDE SERPL-SCNC: 98 MMOL/L (ref 98–107)
CO2 SERPL-SCNC: 14 MMOL/L (ref 21–32)
CO2 SERPL-SCNC: 24 MMOL/L (ref 21–32)
CREAT SERPL-MCNC: 0.27 MG/DL (ref 0.8–1.5)
CREAT SERPL-MCNC: 1.1 MG/DL (ref 0.8–1.5)
ERYTHROCYTE [DISTWIDTH] IN BLOOD BY AUTOMATED COUNT: 12.2 % (ref 11.9–14.6)
GLUCOSE BLD STRIP.AUTO-MCNC: 190 MG/DL (ref 65–100)
GLUCOSE BLD STRIP.AUTO-MCNC: 209 MG/DL (ref 65–100)
GLUCOSE BLD STRIP.AUTO-MCNC: 232 MG/DL (ref 65–100)
GLUCOSE BLD STRIP.AUTO-MCNC: 390 MG/DL (ref 65–100)
GLUCOSE SERPL-MCNC: 155 MG/DL (ref 65–100)
GLUCOSE SERPL-MCNC: 257 MG/DL (ref 65–100)
HCT VFR BLD AUTO: 24.3 % (ref 41.1–50.3)
HGB BLD-MCNC: 8.4 G/DL (ref 13.6–17.2)
MCH RBC QN AUTO: 32.9 PG (ref 26.1–32.9)
MCHC RBC AUTO-ENTMCNC: 34.6 G/DL (ref 31.4–35)
MCV RBC AUTO: 95.3 FL (ref 79.6–97.8)
PLATELET # BLD AUTO: 251 K/UL (ref 150–450)
PMV BLD AUTO: 8.3 FL (ref 10.8–14.1)
POTASSIUM SERPL-SCNC: 2.4 MMOL/L (ref 3.5–5.1)
POTASSIUM SERPL-SCNC: 4.7 MMOL/L (ref 3.5–5.1)
RBC # BLD AUTO: 2.55 M/UL (ref 4.23–5.67)
SODIUM SERPL-SCNC: 133 MMOL/L (ref 136–145)
SODIUM SERPL-SCNC: 145 MMOL/L (ref 136–145)
WBC # BLD AUTO: 9.1 K/UL (ref 4.3–11.1)

## 2017-10-13 PROCEDURE — 97161 PT EVAL LOW COMPLEX 20 MIN: CPT

## 2017-10-13 PROCEDURE — 85027 COMPLETE CBC AUTOMATED: CPT | Performed by: INTERNAL MEDICINE

## 2017-10-13 PROCEDURE — 74011000258 HC RX REV CODE- 258: Performed by: NURSE PRACTITIONER

## 2017-10-13 PROCEDURE — 74011250636 HC RX REV CODE- 250/636: Performed by: SURGERY

## 2017-10-13 PROCEDURE — 77030027138 HC INCENT SPIROMETER -A

## 2017-10-13 PROCEDURE — 36415 COLL VENOUS BLD VENIPUNCTURE: CPT | Performed by: INTERNAL MEDICINE

## 2017-10-13 PROCEDURE — 65270000029 HC RM PRIVATE

## 2017-10-13 PROCEDURE — 94760 N-INVAS EAR/PLS OXIMETRY 1: CPT

## 2017-10-13 PROCEDURE — 74011250636 HC RX REV CODE- 250/636: Performed by: NURSE PRACTITIONER

## 2017-10-13 PROCEDURE — 74011636637 HC RX REV CODE- 636/637: Performed by: INTERNAL MEDICINE

## 2017-10-13 PROCEDURE — 80048 BASIC METABOLIC PNL TOTAL CA: CPT | Performed by: INTERNAL MEDICINE

## 2017-10-13 PROCEDURE — 74011250637 HC RX REV CODE- 250/637: Performed by: NURSE PRACTITIONER

## 2017-10-13 PROCEDURE — 71010 XR CHEST SNGL V: CPT

## 2017-10-13 PROCEDURE — 97165 OT EVAL LOW COMPLEX 30 MIN: CPT

## 2017-10-13 PROCEDURE — 99232 SBSQ HOSP IP/OBS MODERATE 35: CPT | Performed by: INTERNAL MEDICINE

## 2017-10-13 PROCEDURE — 74011000250 HC RX REV CODE- 250: Performed by: INTERNAL MEDICINE

## 2017-10-13 PROCEDURE — 74011250637 HC RX REV CODE- 250/637: Performed by: SURGERY

## 2017-10-13 PROCEDURE — 74011250636 HC RX REV CODE- 250/636: Performed by: INTERNAL MEDICINE

## 2017-10-13 PROCEDURE — 82962 GLUCOSE BLOOD TEST: CPT

## 2017-10-13 RX ORDER — PANTOPRAZOLE SODIUM 40 MG/1
40 TABLET, DELAYED RELEASE ORAL
Status: DISCONTINUED | OUTPATIENT
Start: 2017-10-13 | End: 2017-10-15 | Stop reason: HOSPADM

## 2017-10-13 RX ADMIN — INSULIN LISPRO 4 UNITS: 100 INJECTION, SOLUTION INTRAVENOUS; SUBCUTANEOUS at 23:19

## 2017-10-13 RX ADMIN — PIPERACILLIN SODIUM AND TAZOBACTAM SODIUM 4.5 G: 4; .5 INJECTION, POWDER, LYOPHILIZED, FOR SOLUTION INTRAVENOUS at 00:34

## 2017-10-13 RX ADMIN — KETOROLAC TROMETHAMINE 15 MG: 15 INJECTION, SOLUTION INTRAMUSCULAR; INTRAVENOUS at 11:36

## 2017-10-13 RX ADMIN — METOPROLOL TARTRATE 25 MG: 25 TABLET ORAL at 21:27

## 2017-10-13 RX ADMIN — SIMVASTATIN 40 MG: 40 TABLET, FILM COATED ORAL at 21:27

## 2017-10-13 RX ADMIN — PANTOPRAZOLE SODIUM 40 MG: 40 TABLET, DELAYED RELEASE ORAL at 15:57

## 2017-10-13 RX ADMIN — INSULIN LISPRO 2 UNITS: 100 INJECTION, SOLUTION INTRAVENOUS; SUBCUTANEOUS at 07:29

## 2017-10-13 RX ADMIN — KETOROLAC TROMETHAMINE 15 MG: 15 INJECTION, SOLUTION INTRAMUSCULAR; INTRAVENOUS at 17:28

## 2017-10-13 RX ADMIN — GABAPENTIN 200 MG: 100 CAPSULE ORAL at 21:26

## 2017-10-13 RX ADMIN — LEVOTHYROXINE SODIUM 75 MCG: 75 TABLET ORAL at 06:19

## 2017-10-13 RX ADMIN — INSULIN GLARGINE 20 UNITS: 100 INJECTION, SOLUTION SUBCUTANEOUS at 21:26

## 2017-10-13 RX ADMIN — Medication 10 ML: at 14:08

## 2017-10-13 RX ADMIN — Medication 10 ML: at 21:30

## 2017-10-13 RX ADMIN — Medication 1 AMPULE: at 08:16

## 2017-10-13 RX ADMIN — KETOROLAC TROMETHAMINE 15 MG: 15 INJECTION, SOLUTION INTRAMUSCULAR; INTRAVENOUS at 00:23

## 2017-10-13 RX ADMIN — PIPERACILLIN SODIUM AND TAZOBACTAM SODIUM 4.5 G: 4; .5 INJECTION, POWDER, LYOPHILIZED, FOR SOLUTION INTRAVENOUS at 08:13

## 2017-10-13 RX ADMIN — Medication 10 ML: at 06:06

## 2017-10-13 RX ADMIN — INSULIN LISPRO 18 UNITS: 100 INJECTION, SOLUTION INTRAVENOUS; SUBCUTANEOUS at 17:27

## 2017-10-13 RX ADMIN — GABAPENTIN 200 MG: 100 CAPSULE ORAL at 08:16

## 2017-10-13 RX ADMIN — MONTELUKAST SODIUM 10 MG: 10 TABLET, FILM COATED ORAL at 21:27

## 2017-10-13 RX ADMIN — GABAPENTIN 200 MG: 100 CAPSULE ORAL at 15:56

## 2017-10-13 RX ADMIN — KETOROLAC TROMETHAMINE 15 MG: 15 INJECTION, SOLUTION INTRAMUSCULAR; INTRAVENOUS at 06:06

## 2017-10-13 RX ADMIN — INSULIN LISPRO 4 UNITS: 100 INJECTION, SOLUTION INTRAVENOUS; SUBCUTANEOUS at 11:56

## 2017-10-13 RX ADMIN — Medication 1 AMPULE: at 21:25

## 2017-10-13 RX ADMIN — ENOXAPARIN SODIUM 40 MG: 40 INJECTION SUBCUTANEOUS at 17:27

## 2017-10-13 RX ADMIN — ALUMINUM HYDROXIDE, MAGNESIUM HYDROXIDE, AND SIMETHICONE 30 ML: 200; 200; 20 SUSPENSION ORAL at 00:20

## 2017-10-13 RX ADMIN — METOPROLOL TARTRATE 25 MG: 25 TABLET ORAL at 08:16

## 2017-10-13 RX ADMIN — PIPERACILLIN SODIUM AND TAZOBACTAM SODIUM 4.5 G: 4; .5 INJECTION, POWDER, LYOPHILIZED, FOR SOLUTION INTRAVENOUS at 15:57

## 2017-10-13 RX ADMIN — PANTOPRAZOLE SODIUM 40 MG: 40 TABLET, DELAYED RELEASE ORAL at 07:25

## 2017-10-13 RX ADMIN — ONDANSETRON 4 MG: 2 INJECTION INTRAMUSCULAR; INTRAVENOUS at 00:22

## 2017-10-13 NOTE — PROGRESS NOTES
Physical Therapy Note:    Participated in interdisciplinary rounds in ICU and chart reviewed. Patient is experiencing decrease in function from baseline. Patient would benefit from skilled acute therapy to increase independence with self care/ADLs, strength, endurance, and functional mobility. Recommend PT/OT consult when medically stable and MD agrees.     Thank you for your consideration,  Halley Paula, PT, DPT

## 2017-10-13 NOTE — PROGRESS NOTES
Mela Like  Admission Date: 10/10/2017             ICU Daily Progress Note: 10/13/2017     66 y.o. CM presented with a history of asthma and previously been seen in our office 6-8 years ago. Was seen by his PCP, Dr. Ana Paula Garcia, on Sept 25th with cough, wheezing, dyspnea and right sided pleuritic chest pain. His CXR then was reportedly clear. He was treated for suspected bronchitis with Rocephin and Levaquin. Was seen 10/3 with ongoing chest discomfort and CXR showed a right pleural effusion and chest CT scan which confirmed. WBC was elevated 16.1 and he received 2 more daily doses of IM Rocephin and additional 10 day course of [place] Bash was referred to [address] for thoracentesis. Had ongoing chest pain, productive cough with yellow sputum, fatigue, lack of energy and shortness of breath. Right thoracentesis attempted with 5ml jaurez pus and no additional fluid could be removed. General surgery was consulted for empyema and is S/P right thoracotomy with decortication on 10/12. Subjective: In ICU with dilaudid PCA. Chest tubes X 2 per surgery.     Current Facility-Administered Medications   Medication Dose Route Frequency    HYDROmorphone (DILAUDID) 20 mg / 20 mL PCA   IntraVENous CONTINUOUS    enoxaparin (LOVENOX) injection 40 mg  40 mg SubCUTAneous Q24H    alcohol 62% (NOZIN) nasal  1 Ampule  1 Ampule Topical Q12H    ketorolac (TORADOL) injection 15 mg  15 mg IntraVENous Q6H    gabapentin (NEURONTIN) capsule 200 mg  200 mg Oral TID    ondansetron (ZOFRAN) injection 4 mg  4 mg IntraVENous Q6H PRN    alum-mag hydroxide-simeth (MYLANTA) oral suspension 30 mL  30 mL Oral Q4H PRN    levothyroxine (SYNTHROID) tablet 75 mcg  75 mcg Oral 6am    metoprolol tartrate (LOPRESSOR) tablet 25 mg  25 mg Oral Q12H    montelukast (SINGULAIR) tablet 10 mg  10 mg Oral QHS    simvastatin (ZOCOR) tablet 40 mg  40 mg Oral QHS    sodium chloride (NS) flush 5-10 mL  5-10 mL IntraVENous Q8H  sodium chloride (NS) flush 5-10 mL  5-10 mL IntraVENous PRN    HYDROcodone-acetaminophen (NORCO) 7.5-325 mg per tablet 1 Tab  1 Tab Oral Q4H PRN    piperacillin-tazobactam (ZOSYN) 4.5 g in 0.9% sodium chloride (MBP/ADV) 100 mL  4.5 g IntraVENous Q8H    pantoprazole (PROTONIX) tablet 40 mg  40 mg Oral ACB    albuterol (PROVENTIL VENTOLIN) nebulizer solution 2.5 mg  2.5 mg Nebulization Q6H PRN    budesonide (PULMICORT) 500 mcg/2 ml nebulizer suspension  500 mcg Nebulization BID RT    And    albuterol CONCENTRATE 2.5mg/0.5 mL neb soln  2.5 mg Nebulization Q6H RT    insulin lispro (HUMALOG) injection   SubCUTAneous AC&HS    insulin glargine (LANTUS) injection 20 Units  20 Units SubCUTAneous QHS    zolpidem (AMBIEN) tablet 5 mg  5 mg Oral QHS PRN         Objective:     Vitals:    10/13/17 0528 10/13/17 0558 10/13/17 0628 10/13/17 0816   BP: 160/77 162/77 151/77 164/79   Pulse: 86 85 84 86   Resp: 11 16 14    Temp:       SpO2: 97% 98% 98%    Weight:       Height:         Intake and Output:   10/11 1901 - 10/13 0700  In: 975 [I.V.:975]  Out: 2019 [Urine:1590]       Physical Exam:          GEN: acutely ill,   HEENT:  no alar flaring or epistaxis, oral mucosa moist without cyanosis,   NECK:  no JVD, no retractions, no thyromegaly or masses,   LUNGS:  chest tubes on right  HEART:  RRR with no M,G,R;  ABDOMEN:  soft with no tenderness; positive bowel sounds present  EXTREMITIES:  warm with no cyanosis,no lower leg edema  SKIN:  no jaundice or ecchymosis   NEURO:  A & O X 3    LINES: peripheral IV, chest tubes X 2, solano  DRIPS: none  Dilaudid PCA  NUTRITION: regular diet        CHEST XRAY:         LAB  Recent Labs      10/10/17   1652   WBC  9.7   HGB  13.2*   HCT  38.3*   PLT  452*     Recent Labs      10/12/17   1334  10/10/17   1652   NA   --   136   K   --   4.3   CL   --   102   CO2   --   28   GLU   --   139*   BUN   --   18   CREA   --   1.04   MG  2.2   --      Recent Labs      10/12/17   1335   PH  7.34* PCO2  41   PO2  120*   HCO3  22       Cultures:   Body fluid  Special Requests: RIGHT   Preliminary   GRAM STAIN GREATER THAN 100 WBCS/OIF  Preliminary   GRAM STAIN NO DEFINITE ORGANISM SEEN   Preliminary   Culture result: SCANT GRAM NEGATIVE RODS (A)   Preliminary   Culture result: SUBCULTURE IN PROGRESS           Assessment:     Patient Active Problem List   Diagnosis Code    Type II or unspecified type diabetes mellitus without mention of complication, not stated as uncontrolled E11.9    Essential hypertension, benign I10    Other and unspecified hyperlipidemia E78.5    Hypothyroidism E03.9    Asthma J45.909    Family history of coronary artery disease Z80.55    CAD (coronary artery disease) I25.10    Hyperlipidemia E78.5    Peripheral neuropathy G62.9    Diabetes with ulcer of foot ([address] E11.621, L97.509    Coronary artery disease involving native coronary artery without angina pectoris I25.10    Type 2 diabetes mellitus with both eyes affected by mild nonproliferative retinopathy without macular edema, with long-term current use of insulin ([address] P76.0851, Z79.4    Diabetic peripheral neuropathy (HCC) E11.42    Pleural effusion on right J90    Empyema ([address] J86.9    Type II diabetes mellitus with nephropathy ([address] E11.21       Plan     Hospital Problems  Date Reviewed: 10/12/2017          Codes Class Noted POA    Type II diabetes mellitus with nephropathy ([address] (Chronic) ICD-10-CM: E11.21  ICD-9-CM: 250.40, 583.81  10/11/2017 Yes        Hypothyroidism (Chronic) ICD-10-CM: E03.9  ICD-9-CM: 244.9  10/10/2017 Yes    chronic    * (Principal)Pleural effusion on right ICD-10-CM: J90  ICD-9-CM: 511.9  10/10/2017 Yes    S/P decortication    Empyema ([address] ICD-10-CM: J86.9  ICD-9-CM: 510.9  10/10/2017 Yes    On abx, s/p surgery          -- OOB as able, pain control  -- ABX: zosyn  -- chest tubes and PCA per surgery  --transfer to the floor per surgery      Conception Ringer, NP    More than 50% of time documented was spent in face-to-face contact with the patient and in the care of the patient on the floor/unit where the patient is located. Lungs:   rhonchi  Heart:  RRR with no Murmur/Rubs/Gallops    Additional Comments:  cxr looks good post op    I have spoken with and examined the patient. I agree with the above assessment and plan as documented.     Nhan Jean MD

## 2017-10-13 NOTE — PROGRESS NOTES
Lab called with other electrolyte changes. Lab work drawn from [facility] this AM. Calcium less than 5. CO2 of 14. K 2.4.  to come and draw lab work to verify.

## 2017-10-13 NOTE — PROGRESS NOTES
TRANSFER - IN REPORT:    Verbal report received from Curt Vick on Ramon Cole  being received from ICU for routine progression of care      Report consisted of patients Situation, Background, Assessment and   Recommendations(SBAR). Information from the following report(s) Kardex was reviewed with the receiving nurse. Opportunity for questions and clarification was provided. Assessment completed upon patients arrival to unit and care assumed. Dual skin assessment completed with Danny Riley with abrasion (approximately dime size) to RLE and Allevyn foam dressing to sacrum from prevention of skin breakdown. No skin breakdown noted to sacrum. Oriented to room and call system. Instructed to call with needs.

## 2017-10-13 NOTE — ADT AUTH CERT NOTES
Pleural Effusion - Care Day 3 (10/12/2017) by Deandra Szymanski RN        Review Status Review Entered       Completed 10/13/2017       Details              Care Day: 3 Care Date: 10/12/2017 Level of Care: ICU       Guideline Day 3        Clinical Status       (X) * Hemodynamic stability       10/13/2017 12:56 PM EDT by Vita Fermin         NSR 91, /72,              ( ) * CXR or ultrasound shows stability or improvement       10/13/2017 12:56 PM EDT by Vita Fermin         Atelectasis right midlung zone status post thoracotomy.              ( ) * Infection absent or outpatient treatment planned       10/13/2017 12:56 PM EDT by Vita Fermin         Right empyema, recent pneumonia              (X) * Tachypnea absent       10/13/2017 12:56 PM EDT by Vita Fermin         R 15-20              (X) * Oxygen absent or at baseline need       10/13/2017 12:56 PM EDT by Vita Fermin         SAT 97% RA              (X) * Pain absent or managed       10/13/2017 12:56 PM EDT by Vita Fermin         PAIN 5/10              ( ) * Discharge plans and education understood              Activity       (X) * Ambulatory       10/13/2017 12:56 PM EDT by Vita Fermin         IN ROOM                     Routes       ( ) * Oral hydration, medications, and diet       10/13/2017 12:56 PM EDT by Vita Fermin         NOZIN, LOVENOX, NEURONTIN, NORCO, DILAUDID PCA, LANTUS, SSI, TORADOL IV, LOPRESSOR PO, ZOSYN 4.5 GMS IV Q 8,  NOVOLIN SQ AND IV, DILAUDID IV, LR 75/HR, PROTONIX PO QD                     Interventions       ( ) * Chest tube absent or outpatient care arranged       10/13/2017 12:56 PM EDT by Vita Fermin         X 2              (X) CXR       10/13/2017 12:56 PM EDT by Vita Fermin         Atelectasis right midlung zone status post thoracotomy.                     10/13/2017 12:56 PM EDT by Vita Fermin       Subject: Additional Clinical Information       DATE OF SURGERY: 10/12/2017  NAME OF SURGEON: Mauricio Yancey Vanita Naqvi MD   PREOPERATIVE DIAGNOSIS: Right empyema, recent pneumonia.  POSTOPERATIVE DIAGNOSIS: Right empyema, recent pneumonia.  NAME OF PROCEDURE:   1. Right thoracotomy with decortication. 2. Wedge resection of right lower lobe. 3. Intercostal nerve block with cryoablation.   INDICATIONS: This is a 55-year-old gentleman who presented with a loculated right pleural effusion consistent with empyema. He reported 2 weeks' history of pneumonia and his sister actually reported much longer history of pneumonia. He had an episode as early as this February. The patient was offered for surgery to decorticate his empyema. He understood the risks and benefits, agreed to proceed. two 36 chest tubes were placed, 1 to the apex, 1 along the diaphragm  WBC: 9.7RBC: 3.97 (L)HGB: 13.2 (L)HCT: 38.3 (L)PLATELET: 113 (H)pH: 7.34 (L)PCO2: 41PO2: 120 (H)BICARBONATE: 22O2 SAT: 98

## 2017-10-13 NOTE — INTERDISCIPLINARY ROUNDS
Interdisciplinary team rounds were held 10/13/2017 with the following team members:Care Management, Nursing, Nurse Practitioner, Nutrition, Palliative Care, Pastoral Care, Pharmacy, Physical Therapy, Physician, Respiratory Therapy, Speech Therapy and Clinical Coordinator and the patient. Plan of care discussed. See clinical pathway and/or care plan for interventions and desired outcomes.

## 2017-10-13 NOTE — PROGRESS NOTES
Bedside report received from Rajwinder Streeter [facility]. PCA verified. Pt alert and oriented. Pt denies pain, nausea, SOB. Lungs coarse. 2 Chest tube present. R chest incision. Dressing clean, dry, intact. SCDs and Allevyn present. Pt with art line. Scab to R great toe. S1S2. Active bowel sounds. Pulses palpable in all extremities. Skin warm and dry. Will continue to monitor. Full assessment as charted.

## 2017-10-13 NOTE — PROGRESS NOTES
Problem: Self Care Deficits Care Plan (Adult)  Goal: *Acute Goals and Plan of Care (Insert Text)      OCCUPATIONAL THERAPY: Initial Assessment and Discharge 10/13/2017  INPATIENT: Hospital Day: 4  Payor: BLUE CROSS / [place] [facility] / Product Type: PPO /      NAME/AGE/GENDER: Karan Hussein is a 73 y.o. male        PRIMARY DIAGNOSIS:  Pleural effusion [J90] Pleural effusion on right Pleural effusion on right  Procedure(s) (LRB):  RIGHT THORACOTOMY / DECORTICATION . NERVE BLOCK OF INTERCOSTAL SPACES WITH CRYOABLATION   (Right)  1 Day Post-Op  ICD-10: Treatment Diagnosis:        · Generalized Muscle Weakness (M62.81)   Precautions/Allergies:         Review of patient's allergies indicates no known allergies. ASSESSMENT:      Mr. Letty Hughes presents s/p above procedure and is mainly limited by lines (2 chest tubes + PCA pump). He presents with generalized weakness/decreased activity tolerance, and is close to his functional baseline in terms of activities of daily living. No further need for acute OT services. Thanks for the consult! This section established at most recent assessment   PROBLEM LIST (Impairments causing functional limitations):  1. Decreased Strength  2. Decreased ADL/Functional Activities  3. Decreased Activity Tolerance    INTERVENTIONS PLANNED: (Benefits and precautions of occupational therapy have been discussed with the patient.)  1. Assessment only      TREATMENT PLAN: Frequency/Duration: Follow patient for today's assessment only. Rehabilitation Potential For Stated Goals: N/A      RECOMMENDED REHABILITATION/EQUIPMENT: (at time of discharge pending progress): Due to the probability of continued deficits (see above) this patient will not likely need continued skilled occupational therapy after discharge.   Equipment:   · None at this time                      OCCUPATIONAL PROFILE AND HISTORY:   History of Present Injury/Illness (Reason for Referral):  Per MD Operative Report: This is a 71-year-old gentleman who presented with   a loculated right pleural effusion consistent with empyema. He   reported 2 weeks' history of pneumonia and his sister actually   reported much longer history of pneumonia. He had an episode as   early as this February. The patient was offered for surgery to   decorticate his empyema. He understood the risks and benefits,   agreed to proceed. Past Medical History/Comorbidities:   Mr. Justyn Garcia  has a past medical history of Asthma; CAD (coronary artery disease); Candida infection; Diabetes ([address] Diabetes with ulcer of foot ([address] (9/30/2014); GERD (gastroesophageal reflux disease); Hypercholesterolemia; Hypertension; NSTEMI, initial episode of care 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 He also has no past medical history of Congestive heart failure, unspecified. Mr. Justyn Garcia  has a past surgical history that includes colonoscopy (01/16/2012); heart catheterization (4/24/2012); coronary artery bypass graft (4/25/13); and other surgical (Right). Social History/Living Environment: Lives alone, but girlfriend stays with patient. Home Environment: Private residence  # Steps to Enter: 0  One/Two Story Residence: Two story  # of Interior Steps: 12  Interior Rails: Both  Lift Chair Available: No  Living Alone: No  Support Systems: Family member(s), Other (comments) (girlfriend)  Patient Expects to be Discharged to[de-identified] Private residence  Current DME Used/Available at Home: None  Prior Level of Function/Work/Activity:  Typically independent with all.    Number of Personal Factors/Comorbidities that affect the Plan of Care: Brief history (0):  LOW COMPLEXITY   ASSESSMENT OF OCCUPATIONAL PERFORMANCE[de-identified]   Activities of Daily Living:           Basic ADLs (From Assessment) Complex ADLs (From Assessment)   Basic ADL  Feeding: Setup  Oral Facial Hygiene/Grooming: Setup  Bathing: Contact guard assistance  Upper Body Dressing: Supervision  Lower Body Dressing: Contact guard assistance  Toileting: Stand by assistance Instrumental ADL  Meal Preparation: Moderate assistance  Homemaking: Moderate assistance   Grooming/Bathing/Dressing Activities of Daily Living     Cognitive Retraining  Safety/Judgement: Awareness of environment                       Bed/Mat Mobility  Sit to Stand: Contact guard assistance  Bed to Chair: Minimum assistance          Most Recent Physical Functioning:   Gross Assessment:  AROM: Within functional limits               Posture:     Balance:  Sitting: Intact  Standing: Impaired  Standing - Static: Good  Standing - Dynamic : Fair Bed Mobility:     Wheelchair Mobility:     Transfers:  Sit to Stand: Contact guard assistance  Stand to Sit: Contact guard assistance  Bed to Chair: Minimum assistance                    Patient Vitals for the past 6 hrs:       BP BP Patient Position SpO2 Pulse   10/13/17 0628 151/77 - 98 % 84   10/13/17 0730 - At rest;Head of bed elevated (Comment degrees) - -   10/13/17 0759 164/79 - 97 % 88   10/13/17 0816 164/79 - - 86   10/13/17 0828 148/68 - 99 % 86   10/13/17 0850 - - 99 % -   10/13/17 0858 146/72 - 99 % 88   10/13/17 0928 139/65 - 95 % 87   10/13/17 0959 145/73 - 97 % 87   10/13/17 1028 147/70 - 97 % 87   10/13/17 1037 146/71 - 98 % 87   10/13/17 1128 145/68 At rest;Head of bed elevated (Comment degrees) 96 % 85   10/13/17 1158 152/67 - 97 % 86        Mental Status  Neurologic State: Alert  Orientation Level: Oriented to person, Oriented to situation, Oriented to place  Cognition: Follows commands  Perception: Appears intact  Perseveration: No perseveration noted  Safety/Judgement: Awareness of environment                               Physical Skills Involved:  1. Strength  2. Activity Tolerance Cognitive Skills Affected (resulting in the inability to perform in a timely and safe manner):  1. None noted Psychosocial Skills Affected:  1.  Habits/Routines   Number of elements that affect the Plan of Care: 1-3:  LOW COMPLEXITY   CLINICAL DECISION MAKIN[address] [place] [address] AM-PAC 6 Clicks   Daily Activity Inpatient Short Form  How much help from another person does the patient currently need. .. Total A Lot A Little None   1. Putting on and taking off regular lower body clothing?   [ ] 1   [ ] 2   [X] 3   [ ] 4   2. Bathing (including washing, rinsing, drying)? [ ] 1   [ ] 2   [X] 3   [ ] 4   3. Toileting, which includes using toilet, bedpan or urinal?   [ ] 1   [ ] 2   [X] 3   [ ] 4   4. Putting on and taking off regular upper body clothing?   [ ] 1   [ ] 2   [X] 3   [ ] 4   5. Taking care of personal grooming such as brushing teeth? [ ] 1   [ ] 2   [X] 3   [ ] 4   6. Eating meals? [ ] 1   [ ] 2   [X] 3   [ ] 4   © , Trustees of [address] [place] [address], under license to [employer]. All rights reserved    Score:  Initial: 18 Most Recent: X (Date: -- )     Interpretation of Tool:  Represents activities that are increasingly more difficult (i.e. Bed mobility, Transfers, Gait). Score 24 23 22-20 19-15 14-10 9-7 6       Modifier CH CI CJ CK CL CM CN         · Self Care:               - CURRENT STATUS:    CK - 40%-59% impaired, limited or restricted               - GOAL STATUS:           CK - 40%-59% impaired, limited or restricted               - D/C STATUS:                       CK - 40%-59% impaired, limited or restricted  Payor: BLUE CROSS / Plan: [place]UE [address] / Product Type: PPO /       Medical Necessity:     · N/A. Reason for Services/Other Comments:  · N/A.    Use of outcome tool(s) and clinical judgement create a POC that gives a: LOW COMPLEXITY             TREATMENT:   (In addition to Assessment/Re-Assessment sessions the following treatments were rendered)      Pre-treatment Symptoms/Complaints:    Pain: Initial:   Pain Intensity 1:  (no complaints of pain)  Post Session:  same      Assessment/Reassessment only, no treatment provided today     Braces/Orthotics/Lines/Etc:   · IV  · solano catheter  · PCA  · bilateral chest tubes  · O2 Device: Room air  Treatment/Session Assessment:    · Response to Treatment:  No treatment rendered. Assessment only. · Interdisciplinary Collaboration:  · Physical Therapist  · Occupational Therapist  · Registered Nurse  · After treatment position/precautions:  · Up in chair  · Bed alarm/tab alert on  · Bed/Chair-wheels locked  · Call light within reach  · Family at bedside  · Nurse at bedside  · Compliance with Program/Exercises: compliant today.   · Recommendations/Intent for next treatment session: N/A  Total Treatment Duration:  OT Patient Time In/Time Out  Time In: 1030  Time Out: 1500 [facility] GIOVANNA Bang, OTR/L

## 2017-10-13 NOTE — PROGRESS NOTES
END OF SHIFT NOTE:    INTAKE/OUTPUT  10/12 0701 - 10/13 0700  In: 975 [I.V.:975]  Out: 1619 [Urine:1190]  Voiding: NO  Catheter: YES  Drain:              Flatus: Patient does have flatus present. Stool:  0 occurrences. Characteristics:       Emesis: 0 occurrences. Characteristics:        VITAL SIGNS  Patient Vitals for the past 12 hrs:   Temp Pulse Resp BP SpO2   10/13/17 1430 98.7 °F (37.1 °C) 95 18 129/78 94 %   10/13/17 1158 - 86 16 152/67 97 %   10/13/17 1128 99.4 °F (37.4 °C) 85 20 145/68 96 %   10/13/17 1037 - 87 23 146/71 98 %   10/13/17 1028 - 87 20 147/70 97 %   10/13/17 0959 - 87 12 145/73 97 %   10/13/17 0928 - 87 12 139/65 95 %   10/13/17 0858 - 88 22 146/72 99 %   10/13/17 0850 - - - - 99 %   10/13/17 0828 - 86 14 148/68 99 %   10/13/17 0816 - 86 - 164/79 -   10/13/17 0759 - 88 15 164/79 97 %       Pain Assessment  Pain Intensity 1: 0 (10/13/17 1908)  Pain Location 1: Rib cage  Pain Intervention(s) 1: Encouraged PCA  Patient Stated Pain Goal: 0    Ambulating  Yes with assistance    Shift report given to oncoming nurse at the bedside.     Terry Damon, RN

## 2017-10-13 NOTE — PROGRESS NOTES
Problem: Mobility Impaired (Adult and Pediatric)  Goal: *Acute Goals and Plan of Care (Insert Text)  LTG:  (1.)Mr. Jerad Woodward will move from supine to sit and sit to supine, scoot up and down and roll side to side in bed with INDEPENDENT within 7 day(s). (2.)Mr. Jerad Woodward will transfer from bed to chair and chair to bed with INDEPENDENT using the least restrictive device within 7 day(s). (3.)Mr. Jerad Woodward will ambulate with modified INDEPENDENCE for 200+ feet with the least restrictive/no device within 7 day(s). (4.)Mr. Jerad Woodward will perform standing static and dynamic balance activities x 8 minutes with SUPERVISION to improve safety within 7 day(s). (5.)Mr. Jread Woodward will ascend and descend 4 stairs using one hand rail(s) with SUPERVISION to improve functional mobility and safety within 7 day(s). PHYSICAL THERAPY: INITIAL ASSESSMENT, AM 10/13/2017  INPATIENT: Hospital Day: 4  Payor: Darryle Blander / Plan: [facility] [employer] [facility] / Product Type: PPO /      NAME/AGE/GENDER: Maya Edge is a 73 y.o. male        PRIMARY DIAGNOSIS: Pleural effusion [J90] Pleural effusion on right Pleural effusion on right  Procedure(s) (LRB):  RIGHT THORACOTOMY / DECORTICATION . NERVE BLOCK OF INTERCOSTAL SPACES WITH CRYOABLATION   (Right)  1 Day Post-Op  ICD-10: Treatment Diagnosis:       · Other abnormalities of gait and mobility (R26.89)   Precaution/Allergies:  Review of patient's allergies indicates no known allergies. ASSESSMENT:      Mr. Jerad Woodward presents sitting edge of bed in ICU with 2 chest tubes, sister at bedside. Patient stood with CGA and transferred to chair at bedside with min assist due to multiple lines/tubes. Standing balance good statically. Anticipate that patient will progress quickly especially as his lines are decreased. Mr. Jerad Woodward would benefit from skilled physical therapy (medically necessary) to address his deficits and maximize his function.           This section established at most recent assessment   PROBLEM LIST (Impairments causing functional limitations):  1. Decreased Transfer Abilities  2. Decreased Ambulation Ability/Technique  3. Decreased Balance  4. Increased Pain  5. Decreased Activity Tolerance    INTERVENTIONS PLANNED: (Benefits and precautions of physical therapy have been discussed with the patient.)  1. Balance Exercise  2. Bed Mobility  3. Gait Training  4. Therapeutic Activites  5. Therapeutic Exercise/Strengthening  6. Transfer Training  7. education  8. Group Therapy      TREATMENT PLAN: Frequency/Duration: 3 times a week for 1 week  Rehabilitation Potential For Stated Goals: EXCELLENT      RECOMMENDED REHABILITATION/EQUIPMENT: (at time of discharge pending progress): Due to the probability of continued deficits (see above) this patient will not likely need continued skilled physical therapy after discharge. Equipment:   · None at this time                   HISTORY:   History of Present Injury/Illness (Reason for Referral):  Per MD note, \"Patient is a 73 y.o.  male presents with a history of asthma. He has previously been seen in our office but thinks it was 6-8 years ago. He was seen by his PCP, Dr. Denise Pritchett, on Sept 25th with cough, wheezing, dyspnea and right sided pleuritic chest pain. His CXR then was reportedly clear. He was treated for suspected bronchitis using IM Rocephin followed by a 7 day course of Levaquin. He was seen in follow up in Oct 3. He reported ongoing chest discomfort. His CXR at that time showed a right pleural effusion. A CT scan was obtained which confirmed this. His WBC is still elevated at 16.1 so he has since received 2 more daily doses of IM Rocephin and been given an additional 10 day course of Levaquin. He was referred to [facility] for thoracentesis. He states that overall his symptoms have improved, though he has ongoing fatigue, lack of energy, and shortness of breath more than his baseline.       He denies any ongoing fever. Some ongoing cough with a small amount of yellow sputum. He also reports ongoing pleuritic chest pain. He states he is a never smoker. He denies any history of asbestos or any other inhaled irritants. \"  Past Medical History/Comorbidities:   Mr. Delilah Lawrence  has a past medical history of Asthma; CAD (coronary artery disease); Candida infection; Diabetes ([facility] Diabetes with ulcer of foot ([facility] (9/30/2014); GERD (gastroesophageal reflux disease); Hypercholesterolemia; Hypertension; NSTEMI, initial episode of care 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 He also has no past medical history of Congestive heart failure, unspecified. Mr. Delilah Lawrence  has a past surgical history that includes colonoscopy (01/16/2012); heart catheterization (4/24/2012); coronary artery bypass graft (4/25/13); and other surgical (Right). Social History/Living Environment:   Home Environment: Private residence  # Steps to Enter: 0  One/Two Story Residence: Two story  # of Interior Steps: 12  Interior Rails: Both  Lift Chair Available: No  Living Alone: No  Support Systems: Family member(s), Other (comments) (girlfriend)  Patient Expects to be Discharged to[de-identified] Private residence  Current DME Used/Available at Home: None  Prior Level of Function/Work/Activity:  Lives at home alone, but girlfriend very supportive. Independent in home and community. Number of Personal Factors/Comorbidities that affect the Plan of Care: 1-2: MODERATE COMPLEXITY   EXAMINATION:   Most Recent Physical Functioning:   Gross Assessment:  AROM: Within functional limits  Strength:  Within functional limits  Sensation: Intact               Posture:     Balance:  Sitting: Intact  Standing: Impaired  Standing - Static: Good  Standing - Dynamic : Fair Bed Mobility:     Wheelchair Mobility:     Transfers:  Sit to Stand: Contact guard assistance  Stand to Sit: Contact guard assistance  Bed to Chair: Minimum assistance  Gait:             Body Structures Involved:  1. Lungs  2. Muscles Body Functions Affected:  1. Sensory/Pain  2. Respiratory  3. Movement Related  4. Skin Related Activities and Participation Affected:  1. Mobility  2. Self Care  3. Domestic Life  4. Community, Social and [place] Lewis   Number of elements that affect the Plan of Care: 4+: HIGH COMPLEXITY   CLINICAL PRESENTATION:   Presentation: Stable and uncomplicated: LOW COMPLEXITY   CLINICAL DECISION MAKIN [facility] Mobility Inpatient Short Form  How much difficulty does the patient currently have. .. Unable A Lot A Little None   1. Turning over in bed (including adjusting bedclothes, sheets and blankets)? [ ] 1   [ ] 2   [X] 3   [ ] 4   2. Sitting down on and standing up from a chair with arms ( e.g., wheelchair, bedside commode, etc.)   [ ] 1   [ ] 2   [X] 3   [ ] 4   3. Moving from lying on back to sitting on the side of the bed? [ ] 1   [ ] 2   [X] 3   [ ] 4   How much help from another person does the patient currently need. .. Total A Lot A Little None   4. Moving to and from a bed to a chair (including a wheelchair)? [ ] 1   [ ] 2   [X] 3   [ ] 4   5. Need to walk in hospital room? [ ] 1   [ ] 2   [X] 3   [ ] 4   6. Climbing 3-5 steps with a railing? [ ] 1   [ ] 2   [X] 3   [ ] 4   © , Trustees of [address] [place] [address], under license to [employer]. All rights reserved    Score:  Initial: 18 Most Recent: X (Date: -- )     Interpretation of Tool:  Represents activities that are increasingly more difficult (i.e. Bed mobility, Transfers, Gait).        Score 24 23 22-20 19-15 14-10 9-7 6       Modifier CH CI CJ CK CL CM CN         · Mobility - Walking and Moving Around:               - CURRENT STATUS:    CK - 40%-59% impaired, limited or restricted               - GOAL STATUS:           CJ - 20%-39% impaired, limited or restricted               - D/C STATUS:                       ---------------To be determined---------------  Payor: BLUE CROSS / [place] [facility] / Product Type: PPO /       Medical Necessity:     · Patient is expected to demonstrate progress in functional technique to increase independence with   and improve safety during all functional mobility. Reason for Services/Other Comments:  · Patient continues to require skilled intervention due to medical complications and patient unable to attend/participate in therapy as expected. Use of outcome tool(s) and clinical judgement create a POC that gives a: Clear prediction of patient's progress: LOW COMPLEXITY                 TREATMENT:   (In addition to Assessment/Re-Assessment sessions the following treatments were rendered)   Pre-treatment Symptoms/Complaints:    Pain: Initial:   Pain Intensity 1: 3  Pain Location 1: Rib cage  Pain Orientation 1: Right  Pain Intervention(s) 1: Encouraged PCA, Repositioned  Post Session:  unchanged      Assessment/Reassessment only, no treatment provided today     Braces/Orthotics/Lines/Etc:   · IV  · solano catheter  · PCA  · 2 chest tubes to Pleurevacs  · O2 Device: Room air  Treatment/Session Assessment:    · Response to Treatment:  Fatigued. · Interdisciplinary Collaboration:  · Physical Therapist  · Occupational Therapist  · Registered Nurse  · After treatment position/precautions:  · Up in chair  · Bed/Chair-wheels locked  · Call light within reach  · RN notified  · Family at bedside  · Compliance with Program/Exercises: compliant all of the time. · Recommendations/Intent for next treatment session: \"Next visit will focus on advancements to more challenging activities and reduction in assistance provided\".   Total Treatment Duration:  PT Patient Time In/Time Out  Time In: 1050  Time Out: [address], PT, DPT

## 2017-10-13 NOTE — PROGRESS NOTES
[facility] 80 CRITICAL CARE OUTREACH NURSE PROGRESS REPORT      SUBJECTIVE: Called to assess patient secondary to transfer from critical care. MEWS Score: 1 (10/13/17 1128)  Vitals:    10/13/17 1028 10/13/17 1037 10/13/17 1128 10/13/17 1158   BP: 147/70 146/71 145/68 152/67   Pulse: 87 87 85 86   Resp: 20 23 20 16   Temp:   99.4 °F (37.4 °C)    SpO2: 97% 98% 96% 97%   Weight:       Height:          EKG: normal EKG, normal sinus rhythm, unchanged from previous tracings. LAB DATA:    Recent Labs      10/13/17   1110  10/13/17   0903  10/12/17   1334  10/10/17   1652   NA  133*  145   --   136   K  4.7  2.4*   --   4.3   CL  98  121*   --   102   CO2  24  14*   --   28   AGAP  11  10   --   6*   GLU  257*  155*   --   139*   BUN  29*  18   --   18   CREA  1.10  0.27*   --   1.04   GFRAA  >60  >60   --   >60   GFRNA  >60  >60   --   >60   CA  9.1   --    --   9.5   MG   --    --   2.2   --         Recent Labs      10/13/17   0903  10/10/17   1652   WBC  9.1  9.7   HGB  8.4*  13.2*   HCT  24.3*  38.3*   PLT  251  452*          OBJECTIVE: Saw pt prior to transfer. Pain Assessment  Pain Intensity 1: 0 (10/13/17 1128)  Pain Location 1: Rib cage  Pain Intervention(s) 1: Encouraged PCA, Repositioned  Patient Stated Pain Goal: 0     ASSESSMENT:  Pt awake and oriented x 4. NAD. States pain is controlled with scheduled Toradol and his PCA this am when working with PT. Lung sounds coarse in bases. No creptius felt. SpO2 98% on RA. CT x 2 to suction with air leak noted in CT #2 that primary RN states was present prior to putting CT to water seal. Pt denies SOB. PLAN:  Will follow per outreach protocol thank you.

## 2017-10-13 NOTE — PROGRESS NOTES
Patient's monitor alarming for continued ST elevation. No current change noted after review of previous rhythm strips. Patient denies chest pain/discomfort at this time. Will continue to monitor.

## 2017-10-13 NOTE — PROGRESS NOTES
PLAN:  Put chest tubes to water seal. Plan to d/c one tube tomorrow and the other Sunday. CXR in AM  10/10 thoracentesis cultures grew GNR- continue Zosyn. Continue PCA  Lovenox, SCDs, IS, H2/PPI prophylaxis  Transfer pt to floor  Anticipate discharge on Monday. ASSESSMENT:  Admit Date: 10/10/2017   1 Day Post-Op  Procedure(s):  RIGHT THORACOTOMY / DECORTICATION . NERVE BLOCK OF INTERCOSTAL SPACES WITH CRYOABLATION      Principal Problem:    Pleural effusion on right (10/10/2017)    Active Problems:    Hypothyroidism (10/10/2017)      Empyema ([facility] [address] (10/10/2017)      Type II diabetes mellitus with nephropathy ([facility] [address] (10/11/2017)         SUBJECTIVE:  Pt doing well this AM, without compliant. Some HTN this AM with SBP in 160s. Oxygenating well on room air. Pain under control with PCA. OBJECTIVE:    I/O:  975/1.6    CT output:  CT#1: 272  CT#2: 57    Constitutional: Alert oriented cooperative patient in no acute distress; appears stated age   Visit Vitals    /79    Pulse 86    Temp 97.9 °F (36.6 °C)    Resp 14    Ht 6' (1.829 m)    Wt 195 lb (88.5 kg)    SpO2 99%    BMI 26.45 kg/m2     Eyes:Sclera are clear. ENMT: no external lesions gross hearing normal; no obvious neck masses, no ear or lip lesions  CV: RRR. Normal perfusion  Resp: No JVD. Breathing is  non-labored; no audible wheezing. Chest tubes in place with dressing c/d/i.   GI: soft and non-distended     Musculoskeletal: unremarkable with normal function. No embolic signs or cyanosis.    Neuro:  Oriented; moves all 4; no focal deficits  Psychiatric: normal affect and mood, no memory impairment      Patient Vitals for the past 24 hrs:   BP Temp Pulse Resp SpO2   10/13/17 0850 - - - - 99 %   10/13/17 0816 164/79 - 86 - -   10/13/17 0628 151/77 - 84 14 98 %   10/13/17 0558 162/77 - 85 16 98 %   10/13/17 0528 160/77 - 86 11 97 %   10/13/17 0458 155/74 - 84 14 98 %   10/13/17 0429 143/77 97.9 °F (36.6 °C) 84 18 95 %   10/13/17 0358 151/72 - 84 24 96 %   10/13/17 0328 146/75 - 84 18 97 %   10/13/17 0258 137/74 - 86 24 97 %   10/13/17 0229 154/74 - 85 13 97 %   10/13/17 0158 150/73 - 88 11 96 %   10/13/17 0128 154/76 - 90 16 96 %   10/13/17 0058 154/72 - 90 15 97 %   10/13/17 0028 165/79 98.5 °F (36.9 °C) 92 11 97 %   10/12/17 2358 159/82 - 93 21 96 %   10/12/17 2329 171/81 - 94 22 96 %   10/12/17 2258 160/78 - 93 18 97 %   10/12/17 2228 148/74 - 92 20 97 %   10/12/17 2158 160/77 - 91 13 98 %   10/12/17 2128 148/73 - 93 14 96 %   10/12/17 2058 139/77 - 92 17 94 %   10/12/17 2028 131/72 - 91 20 97 %   10/12/17 1959 145/73 - 91 15 96 %   10/12/17 1928 142/72 97.8 °F (36.6 °C) 91 20 97 %   10/12/17 1858 140/63 - 89 20 98 %   10/12/17 1759 127/68 - 90 20 99 %   10/12/17 1728 134/72 - 88 20 98 %   10/12/17 1658 133/71 - 90 21 97 %   10/12/17 1628 136/68 - 90 16 96 %   10/12/17 1558 135/68 - 89 16 97 %   10/12/17 1543 135/68 - 88 19 97 %   10/12/17 1528 139/70 - 87 15 98 %   10/12/17 1513 141/70 - 85 17 100 %   10/12/17 1458 141/69 - 83 15 100 %   10/12/17 1447 140/67 96.9 °F (36.1 °C) 83 19 100 %   10/12/17 1417 - - 79 11 99 %   10/12/17 1411 138/73 97.1 °F (36.2 °C) 79 10 100 %   10/12/17 1406 140/72 - 78 12 100 %   10/12/17 1401 138/67 - 77 11 100 %   10/12/17 1356 144/69 - 77 - 100 %   10/12/17 1355 - - 78 16 100 %   10/12/17 1351 149/72 - 77 12 100 %   10/12/17 1346 144/73 - 77 11 100 %   10/12/17 1341 141/71 - 77 14 100 %   10/12/17 1336 138/70 - 74 17 100 %   10/12/17 1331 134/65 - 74 14 100 %   10/12/17 1325 139/66 - 71 14 100 %   10/12/17 1321 136/63 - 70 17 97 %   10/12/17 1316 129/61 - 68 16 97 %   10/12/17 1311 129/56 - 66 15 96 %   10/12/17 1307 127/64 97.1 °F (36.2 °C) 89 15 97 %   10/12/17 1306 127/56 - 68 18 97 %     Labs:  Recent Labs      10/10/17   1652   WBC  9.7   HGB  13.2*   PLT  452*   NA  136   K  4.3   CL  102   CO2  28   BUN  18   CREA  1.04   GLU  139*

## 2017-10-13 NOTE — PROGRESS NOTES
Patient called RN to room with c/o nausea. Patient stated, \"I think it is my reflux. I have not been getting my Prilosec every morning. \"  Noted patient had Mylanta prn; offered to patient, but was declined. Zofran 4mg IV q6hprn ordered.

## 2017-10-13 NOTE — PROGRESS NOTES
TRANSFER - OUT REPORT:    Verbal report given to Maribell Barrett RN(name) on Wisam Kilpatrick  being transferred to [facility](unit) for routine progression of care       Report consisted of patients Situation, Background, Assessment and   Recommendations(SBAR). Information from the following report(s) SBAR, Kardex, OR Summary, Intake/Output, MAR, Recent Results and Cardiac Rhythm NSR was reviewed with the receiving nurse. Lines:   Peripheral IV 10/10/17 Right Hand (Active)   Site Assessment Clean, dry, & intact 10/13/2017  7:30 AM   Phlebitis Assessment 0 10/13/2017  7:30 AM   Infiltration Assessment 0 10/13/2017  7:30 AM   Dressing Status Clean, dry, & intact 10/13/2017  7:30 AM   Dressing Type Transparent;Tape 10/13/2017  7:30 AM   Hub Color/Line Status Infusing 10/13/2017  7:30 AM   Alcohol Cap Used No 10/13/2017  7:30 AM       Peripheral IV 10/12/17 Left Forearm (Active)   Site Assessment Clean, dry, & intact 10/13/2017  7:30 AM   Phlebitis Assessment 0 10/13/2017  7:30 AM   Infiltration Assessment 0 10/13/2017  7:30 AM   Dressing Status Clean, dry, & intact 10/13/2017  7:30 AM   Dressing Type Transparent;Tape 10/13/2017  7:30 AM   Hub Color/Line Status Infusing 10/13/2017  7:30 AM   Alcohol Cap Used No 10/13/2017  7:30 AM        Opportunity for questions and clarification was provided. Patient transported with:   Registered Nurse   Dilaudid PCA.

## 2017-10-13 NOTE — PROGRESS NOTES
MD notified of K 2.4. Orders for nutritional support protocol. MD also made aware of Hgb drop to 8.4. No orders received at this time.

## 2017-10-14 ENCOUNTER — APPOINTMENT (OUTPATIENT)
Dept: GENERAL RADIOLOGY | Age: 67
DRG: 165 | End: 2017-10-14
Attending: PHYSICIAN ASSISTANT
Payer: COMMERCIAL

## 2017-10-14 ENCOUNTER — APPOINTMENT (OUTPATIENT)
Dept: GENERAL RADIOLOGY | Age: 67
DRG: 165 | End: 2017-10-14
Attending: SURGERY
Payer: COMMERCIAL

## 2017-10-14 LAB
GLUCOSE BLD STRIP.AUTO-MCNC: 231 MG/DL (ref 65–100)
GLUCOSE BLD STRIP.AUTO-MCNC: 286 MG/DL (ref 65–100)
GLUCOSE BLD STRIP.AUTO-MCNC: 357 MG/DL (ref 65–100)
GLUCOSE BLD STRIP.AUTO-MCNC: 377 MG/DL (ref 65–100)

## 2017-10-14 PROCEDURE — 74011250637 HC RX REV CODE- 250/637: Performed by: NURSE PRACTITIONER

## 2017-10-14 PROCEDURE — 74011000258 HC RX REV CODE- 258: Performed by: NURSE PRACTITIONER

## 2017-10-14 PROCEDURE — 99232 SBSQ HOSP IP/OBS MODERATE 35: CPT | Performed by: INTERNAL MEDICINE

## 2017-10-14 PROCEDURE — 74011636637 HC RX REV CODE- 636/637: Performed by: INTERNAL MEDICINE

## 2017-10-14 PROCEDURE — 82962 GLUCOSE BLOOD TEST: CPT

## 2017-10-14 PROCEDURE — 74011250636 HC RX REV CODE- 250/636: Performed by: NURSE PRACTITIONER

## 2017-10-14 PROCEDURE — 71020 XR CHEST PA LAT: CPT

## 2017-10-14 PROCEDURE — 74011000250 HC RX REV CODE- 250: Performed by: INTERNAL MEDICINE

## 2017-10-14 PROCEDURE — 71010 XR CHEST SNGL V: CPT

## 2017-10-14 PROCEDURE — 65270000029 HC RM PRIVATE

## 2017-10-14 PROCEDURE — 74011250636 HC RX REV CODE- 250/636: Performed by: SURGERY

## 2017-10-14 PROCEDURE — 74011250637 HC RX REV CODE- 250/637: Performed by: SURGERY

## 2017-10-14 RX ORDER — HYDROMORPHONE HCL IN 0.9% NACL 50 MG/50ML
1 PLASTIC BAG, INJECTION (ML) INJECTION
Status: DISCONTINUED | OUTPATIENT
Start: 2017-10-14 | End: 2017-10-15 | Stop reason: HOSPADM

## 2017-10-14 RX ORDER — AMOXICILLIN 250 MG
2 CAPSULE ORAL 2 TIMES DAILY
Status: DISCONTINUED | OUTPATIENT
Start: 2017-10-14 | End: 2017-10-15 | Stop reason: HOSPADM

## 2017-10-14 RX ORDER — INSULIN GLARGINE 100 [IU]/ML
30 INJECTION, SOLUTION SUBCUTANEOUS
Status: DISCONTINUED | OUTPATIENT
Start: 2017-10-14 | End: 2017-10-15 | Stop reason: HOSPADM

## 2017-10-14 RX ORDER — OXYCODONE AND ACETAMINOPHEN 10; 325 MG/1; MG/1
1 TABLET ORAL
Status: DISCONTINUED | OUTPATIENT
Start: 2017-10-14 | End: 2017-10-15 | Stop reason: HOSPADM

## 2017-10-14 RX ORDER — INSULIN LISPRO 100 [IU]/ML
12 INJECTION, SOLUTION INTRAVENOUS; SUBCUTANEOUS ONCE
Status: COMPLETED | OUTPATIENT
Start: 2017-10-14 | End: 2017-10-14

## 2017-10-14 RX ORDER — FUROSEMIDE 10 MG/ML
20 INJECTION INTRAMUSCULAR; INTRAVENOUS ONCE
Status: COMPLETED | OUTPATIENT
Start: 2017-10-14 | End: 2017-10-14

## 2017-10-14 RX ADMIN — METOPROLOL TARTRATE 25 MG: 25 TABLET ORAL at 08:34

## 2017-10-14 RX ADMIN — KETOROLAC TROMETHAMINE 15 MG: 15 INJECTION, SOLUTION INTRAMUSCULAR; INTRAVENOUS at 00:20

## 2017-10-14 RX ADMIN — METOPROLOL TARTRATE 25 MG: 25 TABLET ORAL at 21:56

## 2017-10-14 RX ADMIN — LEVOTHYROXINE SODIUM 75 MCG: 75 TABLET ORAL at 05:43

## 2017-10-14 RX ADMIN — INSULIN LISPRO 6 UNITS: 100 INJECTION, SOLUTION INTRAVENOUS; SUBCUTANEOUS at 17:34

## 2017-10-14 RX ADMIN — GABAPENTIN 200 MG: 100 CAPSULE ORAL at 16:09

## 2017-10-14 RX ADMIN — GABAPENTIN 200 MG: 100 CAPSULE ORAL at 21:56

## 2017-10-14 RX ADMIN — ENOXAPARIN SODIUM 40 MG: 40 INJECTION SUBCUTANEOUS at 16:09

## 2017-10-14 RX ADMIN — INSULIN LISPRO 4 UNITS: 100 INJECTION, SOLUTION INTRAVENOUS; SUBCUTANEOUS at 08:33

## 2017-10-14 RX ADMIN — Medication 10 ML: at 13:54

## 2017-10-14 RX ADMIN — PIPERACILLIN SODIUM AND TAZOBACTAM SODIUM 4.5 G: 4; .5 INJECTION, POWDER, LYOPHILIZED, FOR SOLUTION INTRAVENOUS at 08:30

## 2017-10-14 RX ADMIN — FUROSEMIDE 20 MG: 10 INJECTION, SOLUTION INTRAMUSCULAR; INTRAVENOUS at 13:53

## 2017-10-14 RX ADMIN — PIPERACILLIN SODIUM AND TAZOBACTAM SODIUM 4.5 G: 4; .5 INJECTION, POWDER, LYOPHILIZED, FOR SOLUTION INTRAVENOUS at 00:20

## 2017-10-14 RX ADMIN — Medication 10 ML: at 22:03

## 2017-10-14 RX ADMIN — PANTOPRAZOLE SODIUM 40 MG: 40 TABLET, DELAYED RELEASE ORAL at 08:35

## 2017-10-14 RX ADMIN — PIPERACILLIN SODIUM AND TAZOBACTAM SODIUM 4.5 G: 4; .5 INJECTION, POWDER, LYOPHILIZED, FOR SOLUTION INTRAVENOUS at 16:12

## 2017-10-14 RX ADMIN — INSULIN LISPRO 10 UNITS: 100 INJECTION, SOLUTION INTRAVENOUS; SUBCUTANEOUS at 21:57

## 2017-10-14 RX ADMIN — INSULIN LISPRO 12 UNITS: 100 INJECTION, SOLUTION INTRAVENOUS; SUBCUTANEOUS at 12:16

## 2017-10-14 RX ADMIN — SIMVASTATIN 40 MG: 40 TABLET, FILM COATED ORAL at 21:56

## 2017-10-14 RX ADMIN — INSULIN GLARGINE 30 UNITS: 100 INJECTION, SOLUTION SUBCUTANEOUS at 21:57

## 2017-10-14 RX ADMIN — Medication 1 AMPULE: at 08:29

## 2017-10-14 RX ADMIN — OXYCODONE HYDROCHLORIDE AND ACETAMINOPHEN 1 TABLET: 10; 325 TABLET ORAL at 13:53

## 2017-10-14 RX ADMIN — STANDARDIZED SENNA CONCENTRATE AND DOCUSATE SODIUM 2 TABLET: 8.6; 5 TABLET, FILM COATED ORAL at 17:33

## 2017-10-14 RX ADMIN — Medication 10 ML: at 05:47

## 2017-10-14 RX ADMIN — PANTOPRAZOLE SODIUM 40 MG: 40 TABLET, DELAYED RELEASE ORAL at 16:09

## 2017-10-14 RX ADMIN — Medication 1 AMPULE: at 22:02

## 2017-10-14 RX ADMIN — GABAPENTIN 200 MG: 100 CAPSULE ORAL at 08:34

## 2017-10-14 RX ADMIN — MONTELUKAST SODIUM 10 MG: 10 TABLET, FILM COATED ORAL at 21:57

## 2017-10-14 NOTE — PROGRESS NOTES
Josie Bennett, NP removed CT #2. New xeroform/ gauze applied to site and secured with tape. Pt tolerated well.

## 2017-10-14 NOTE — PROGRESS NOTES
Outreach RN in to assess patient. Patient off floor to xray. Spoke with Primary RN, Selam Newman, who states no concerns at this time. Will continue to round per outreach protocol.

## 2017-10-14 NOTE — PROGRESS NOTES
END OF SHIFT NOTE:    INTAKE/OUTPUT  10/13 0701 - 10/14 0700  In: 65 [P.O.:120; I.V.:348]  Out: 1265 [Urine:1165]  Voiding: YES  Catheter: YES  Drain:              Flatus: Patient does have flatus present. Stool:  0 occurrences. Characteristics:       Emesis: 0 occurrences. Characteristics:        VITAL SIGNS  Patient Vitals for the past 12 hrs:   Temp Pulse Resp BP SpO2   10/14/17 0300 98.6 °F (37 °C) 84 18 160/87 96 %   10/13/17 2350 98.5 °F (36.9 °C) 86 18 142/75 94 %   10/13/17 2024 - - - - 93 %   10/13/17 2000 98.8 °F (37.1 °C) 95 18 139/71 94 %       Pain Assessment  Pain Intensity 1: 0 (10/14/17 0000)  Pain Location 1: Rib cage  Pain Intervention(s) 1: Encouraged PCA  Patient Stated Pain Goal: 0    Ambulating  No    Shift report given to oncoming nurse at the bedside.     Jerri Jimenez RN

## 2017-10-14 NOTE — PROGRESS NOTES
PLAN:  Right Chest tube x 2 to water seal. DC CT #2. Plan to d/c the other Sunday. CXR in AM  10/10 thoracentesis cultures grew GNR- continue Zosyn. Continue PCA  Lovenox, SCDs, IS, H2/PPI prophylaxis  Anticipate discharge on Monday    ASSESSMENT:  Admit Date: 10/10/2017   2 Days Post-Op  Procedure(s):  RIGHT THORACOTOMY / DECORTICATION . NERVE BLOCK OF INTERCOSTAL SPACES WITH CRYOABLATION      Principal Problem:    Pleural effusion on right (10/10/2017)    Active Problems:    Hypothyroidism (10/10/2017)      Empyema ([facility] [address] (10/10/2017)      Type II [facility] mellitus with nephropathy [facility] (10/11/2017)       SUBJECTIVE:  Pt resting in bed. No new complaints. Right CT x 2 in place. NAD. OBJECTIVE:  Constitutional: Alert, cooperative, no acute distress; appears stated age   Visit Vitals    /71    Pulse 92    Temp 99.6 °F (37.6 °C)    Resp 18    Ht 6' (1.829 m)    Wt 195 lb (88.5 kg)    SpO2 97%    BMI 26.45 kg/m2     Eyes:Sclera are clear. ENMT: no external lesions gross hearing normal; no obvious neck masses, no ear or lip lesions  CV: RRR. Normal perfusion  Resp: No JVD. Breathing is  non-labored; no audible wheezing. Chest tubes in place with dressing c/d/i.   GI: soft and non-distended     Musculoskeletal: unremarkable with normal function. No embolic signs or cyanosis.    Neuro:  Oriented; moves all 4; no focal deficits  Psychiatric: normal affect and mood, no memory impairment    Patient Vitals for the past 24 hrs:   BP Temp Pulse Resp SpO2   10/14/17 1540 105/71 99.6 °F (37.6 °C) 92 18 97 %   10/14/17 1133 110/55 99.3 °F (37.4 °C) 87 18 97 %   10/14/17 0700 146/79 99 °F (37.2 °C) 91 18 95 %   10/14/17 0300 160/87 98.6 °F (37 °C) 84 18 96 %   10/13/17 2350 142/75 98.5 °F (36.9 °C) 86 18 94 %   10/13/17 2024 - - - - 93 %   10/13/17 2000 139/71 98.8 °F (37.1 °C) 95 18 94 %     Labs:  Recent Labs      10/13/17   1110  10/13/17   0903   WBC   --   9.1   HGB   --   8.4*   PLT   --   251   NA 133*  145   K  4.7  2.4*   CL  98  121*   CO2  24  14*   BUN  29*  18   CREA  1.10  0.27*   GLU  257*  155*       Patrice Quezada, FNP-BC    The patient was seen in conjunction with Dr. Tesfaye Correa who independently evaluated the patient, reviewed the chart and agreed with the assessment and plan.

## 2017-10-14 NOTE — PROGRESS NOTES
Critical Care Outreach Nurse Progress Report:    Subjective: In to assess pt secondary to transfer from  Unit. MEWS Score: 1 (10/13/17 2000)    Vitals:    10/13/17 1158 10/13/17 1430 10/13/17 2000 10/13/17 2024   BP: 152/67 129/78 139/71    Pulse: 86 95 95    Resp: 16 18 18    Temp:  98.7 °F (37.1 °C) 98.8 °F (37.1 °C)    SpO2: 97% 94% 94% 93%   Weight:       Height:            Objective: Pt found resting in bed watching TV. Pain Intensity 1: 0 (10/13/17 1908)  Pain Location 1: Rib cage  Pain Intervention(s) 1: Encouraged PCA  Patient Stated Pain Goal: 0    Assessment: Pt is alert and oriented. Pt denies any pain. States pain is controlled. PCA in progress. CT x 2 to right side. #2 CT has air leak. R/A sat 935. HR=95. Pt denies any SOB. Plan: Will follow per protocol.

## 2017-10-14 NOTE — PROGRESS NOTES
END OF SHIFT NOTE:    INTAKE/OUTPUT  10/13 0701 - 10/14 0700  In: 539.2 [P.O.:120; I.V.:419.2]  Out: 1370 [Urine:1195]  Voiding: YES  Catheter: NO  Drain:              Flatus: Patient does have flatus present. Stool:  0 occurrences. Characteristics:       Emesis: 0 occurrences. Characteristics:        VITAL SIGNS  Patient Vitals for the past 12 hrs:   Temp Pulse Resp BP SpO2   10/14/17 1540 99.6 °F (37.6 °C) 92 18 105/71 97 %   10/14/17 1133 99.3 °F (37.4 °C) 87 18 110/55 97 %       Pain Assessment  Pain Intensity 1: 0 (10/14/17 1359)  Pain Location 1: Rib cage  Pain Intervention(s) 1: Encouraged PCA  Patient Stated Pain Goal: 0    Ambulating  Yes    Shift report given to oncoming nurse at the bedside.     Dony Martinez RN

## 2017-10-14 NOTE — PROGRESS NOTES
Marion Cormierr  Admission Date: 10/10/2017             Daily Progress Note: 10/14/2017    The patient's chart is reviewed and the patient is discussed with the staff. 73 y.o. CM presented with a history of asthma and previously been seen in our office 6-8 years ago. Was seen by his PCP, Dr. Sury Anglin, on Sept 25th with cough, wheezing, dyspnea and right sided pleuritic chest pain. His CXR then was reportedly clear. He was treated for suspected bronchitis with Rocephin and Levaquin. Was seen 10/3 with ongoing chest discomfort and CXR showed a right pleural effusion and chest CT scan which confirmed. [facility] was elevated 16.1 and he received 2 more daily doses of IM Rocephin and additional 10 day course of [place] was referred to [facility] for thoracentesis. Had ongoing chest pain, productive cough with yellow sputum, fatigue, lack of energy and shortness of breath.  Right thoracentesis attempted with 5ml juarez pus and no additional fluid could be removed.  General surgery was consulted for empyema and is S/P right thoracotomy with decortication on 10/12.       Subjective:     Breathing OK  Still chest soreness      Current Facility-Administered Medications   Medication Dose Route Frequency    pantoprazole (PROTONIX) tablet 40 mg  40 mg Oral ACB&D    NUTRITIONAL SUPPORT ELECTROLYTE PRN ORDERS   Does Not Apply PRN    HYDROmorphone (DILAUDID) 20 mg / 20 mL PCA   IntraVENous CONTINUOUS    enoxaparin (LOVENOX) injection 40 mg  40 mg SubCUTAneous Q24H    alcohol 62% (NOZIN) nasal  1 Ampule  1 Ampule Topical Q12H    gabapentin (NEURONTIN) capsule 200 mg  200 mg Oral TID    ondansetron (ZOFRAN) injection 4 mg  4 mg IntraVENous Q6H PRN    alum-mag hydroxide-simeth (MYLANTA) oral suspension 30 mL  30 mL Oral Q4H PRN    levothyroxine (SYNTHROID) tablet 75 mcg  75 mcg Oral 6am    metoprolol tartrate (LOPRESSOR) tablet 25 mg  25 mg Oral Q12H    montelukast (SINGULAIR) tablet 10 mg  10 mg Oral QHS    simvastatin (ZOCOR) tablet 40 mg  40 mg Oral QHS    sodium chloride (NS) flush 5-10 mL  5-10 mL IntraVENous Q8H    sodium chloride (NS) flush 5-10 mL  5-10 mL IntraVENous PRN    HYDROcodone-acetaminophen (NORCO) 7.5-325 mg per tablet 1 Tab  1 Tab Oral Q4H PRN    piperacillin-tazobactam (ZOSYN) 4.5 g in 0.9% sodium chloride (MBP/ADV) 100 mL  4.5 g IntraVENous Q8H    albuterol (PROVENTIL VENTOLIN) nebulizer solution 2.5 mg  2.5 mg Nebulization Q6H PRN    budesonide (PULMICORT) 500 mcg/2 ml nebulizer suspension  500 mcg Nebulization BID RT    And    albuterol CONCENTRATE 2.5mg/0.5 mL neb soln  2.5 mg Nebulization Q6H RT    insulin lispro (HUMALOG) injection   SubCUTAneous AC&HS    insulin glargine (LANTUS) injection 20 Units  20 Units SubCUTAneous QHS    zolpidem (AMBIEN) tablet 5 mg  5 mg Oral QHS PRN       Review of Systems    Constitutional: negative for fever, chills, sweats  Cardiovascular: negative for chest pain, palpitations, syncope, edema  Gastrointestinal:  negative for dysphagia, reflux, vomiting, diarrhea, abdominal pain, or melena  Neurologic:  negative for focal weakness, numbness, headache    Objective:     Vitals:    10/13/17 2024 10/13/17 2350 10/14/17 0300 10/14/17 0700   BP:  142/75 160/87 146/79   Pulse:  86 84 91   Resp:  18 18 18   Temp:  98.5 °F (36.9 °C) 98.6 °F (37 °C) 99 °F (37.2 °C)   SpO2: 93% 94% 96% 95%   Weight:       Height:         Intake and Output:   10/12 1901 - 10/14 0700  In: 639.2 [P.O.:120; I.V.:519.2]  Out: 2103 [Urine:1745]       Physical Exam:   Constitution:  the patient is well developed and in no acute distress  EENMT:  Sclera clear, pupils equal, oral mucosa moist  Respiratory: coarse, CT x 2   Cardiovascular:  RRR without M,G,R  Gastrointestinal: soft and non-tender; with positive bowel sounds. Musculoskeletal: warm without cyanosis. There is no lower leg edema.   Skin:  no jaundice or rashes, no wounds   Neurologic: no gross neuro deficits     Psychiatric:  alert and oriented x 3    CXR:       LAB  Recent Labs      10/14/17   0626  10/13/17   2202  10/13/17   1650  10/13/17   1135  10/13/17   0727   GLUCPOC  231*  209*  390*  232*  190*      Recent Labs      10/13/17   0903   WBC  9.1   HGB  8.4*   HCT  24.3*   PLT  251     Recent Labs      10/13/17   1110  10/13/17   0903  10/12/17   1334   NA  133*  145   --    K  4.7  2.4*   --    CL  98  121*   --    CO2  24  14*   --    GLU  257*  155*   --    BUN  29*  18   --    CREA  1.10  0.27*   --    MG   --    --   2.2   CA  9.1   --    --      Recent Labs      10/12/17   1335   PH  7.34*   PCO2  41   PO2  120*   HCO3  22         Assessment:  (Medical Decision Making)     Hospital Problems  Date Reviewed: 10/12/2017          Codes Class Noted POA    Type II diabetes mellitus with nephropathy (HCC) (Chronic) ICD-10-CM: E11.21  ICD-9-CM: 250.40, 583.81  10/11/2017 Yes        Hypothyroidism (Chronic) ICD-10-CM: E03.9  ICD-9-CM: 244.9  10/10/2017 Yes        * (Principal)Pleural effusion on right ICD-10-CM: J90  ICD-9-CM: 511.9  10/10/2017 Yes        Empyema ([address] ICD-10-CM: J86.9  ICD-9-CM: 510.9  10/10/2017 Yes              Plan:  (Medical Decision Making)     -- continue ABx  CT per surgery  -pain control     More than 50% of the time documented was spent in face-to-face contact with the patient and in the care of the patient on the floor/unit where the patient is located.     Abdiel Boles MD

## 2017-10-14 NOTE — PROGRESS NOTES
Critical Care Outreach Nurse Progress Report:    Subjective: In to assess pt secondary to transfer out of ICU (3102). MEWS Score: 1 (10/14/17 1540)    Vitals:    10/14/17 0300 10/14/17 0700 10/14/17 1133 10/14/17 1540   BP: 160/87 146/79 110/55 105/71   Pulse: 84 91 87 92   Resp: 18 18 18 18   Temp: 98.6 °F (37 °C) 99 °F (37.2 °C) 99.3 °F (37.4 °C) 99.6 °F (37.6 °C)   SpO2: 96% 95% 97% 97%   Weight:       Height:            LAB DATA:    Recent Labs      10/13/17   1110  10/13/17   0903  10/12/17   1334   NA  133*  145   --    K  4.7  2.4*   --    CL  98  121*   --    CO2  24  14*   --    AGAP  11  10   --    GLU  257*  155*   --    BUN  29*  18   --    CREA  1.10  0.27*   --    GFRAA  >60  >60   --    GFRNA  >60  >60   --    CA  9.1   --    --    MG   --    --   2.2        Recent Labs      10/13/17   0903   WBC  9.1   HGB  8.4*   HCT  24.3*   PLT  251        Objective:     Pain Intensity 1: 0 (10/14/17 0000)  Pain Location 1: Rib cage  Pain Intervention(s) 1: Encouraged PCA  Patient Stated Pain Goal: 0    Assessment: Patient in bed resting comfortably, watching TV with no complaints at this time. Vital signs stable on room air. Plan: Will continue to round on patient per ICU Outreach Protocol.

## 2017-10-15 ENCOUNTER — APPOINTMENT (OUTPATIENT)
Dept: GENERAL RADIOLOGY | Age: 67
DRG: 165 | End: 2017-10-15
Attending: NURSE PRACTITIONER
Payer: COMMERCIAL

## 2017-10-15 ENCOUNTER — APPOINTMENT (OUTPATIENT)
Dept: GENERAL RADIOLOGY | Age: 67
DRG: 165 | End: 2017-10-15
Attending: SURGERY
Payer: COMMERCIAL

## 2017-10-15 VITALS
RESPIRATION RATE: 19 BRPM | HEIGHT: 72 IN | DIASTOLIC BLOOD PRESSURE: 79 MMHG | OXYGEN SATURATION: 95 % | WEIGHT: 195 LBS | TEMPERATURE: 97.8 F | BODY MASS INDEX: 26.41 KG/M2 | HEART RATE: 92 BPM | SYSTOLIC BLOOD PRESSURE: 132 MMHG

## 2017-10-15 LAB
ANION GAP SERPL CALC-SCNC: 10 MMOL/L (ref 7–16)
BACTERIA SPEC CULT: ABNORMAL
BACTERIA SPEC CULT: NORMAL
BACTERIA SPEC CULT: NORMAL
BASOPHILS # BLD: 0.1 K/UL (ref 0–0.2)
BASOPHILS NFR BLD: 1 % (ref 0–2)
BUN SERPL-MCNC: 17 MG/DL (ref 8–23)
CALCIUM SERPL-MCNC: 8.9 MG/DL (ref 8.3–10.4)
CHLORIDE SERPL-SCNC: 97 MMOL/L (ref 98–107)
CO2 SERPL-SCNC: 26 MMOL/L (ref 21–32)
CREAT SERPL-MCNC: 1.1 MG/DL (ref 0.8–1.5)
DIFFERENTIAL METHOD BLD: ABNORMAL
EOSINOPHIL # BLD: 0.4 K/UL (ref 0–0.8)
EOSINOPHIL NFR BLD: 3 % (ref 0.5–7.8)
ERYTHROCYTE [DISTWIDTH] IN BLOOD BY AUTOMATED COUNT: 11.8 % (ref 11.9–14.6)
GLUCOSE BLD STRIP.AUTO-MCNC: 278 MG/DL (ref 65–100)
GLUCOSE BLD STRIP.AUTO-MCNC: 342 MG/DL (ref 65–100)
GLUCOSE SERPL-MCNC: 298 MG/DL (ref 65–100)
GRAM STN SPEC: ABNORMAL
GRAM STN SPEC: ABNORMAL
HCT VFR BLD AUTO: 37.8 % (ref 41.1–50.3)
HGB BLD-MCNC: 12.9 G/DL (ref 13.6–17.2)
IMM GRANULOCYTES # BLD: 0.1 K/UL (ref 0–0.5)
IMM GRANULOCYTES NFR BLD: 0.7 % (ref 0–5)
LYMPHOCYTES # BLD: 2.2 K/UL (ref 0.5–4.6)
LYMPHOCYTES NFR BLD: 18 % (ref 13–44)
MCH RBC QN AUTO: 32.6 PG (ref 26.1–32.9)
MCHC RBC AUTO-ENTMCNC: 34.1 G/DL (ref 31.4–35)
MCV RBC AUTO: 95.5 FL (ref 79.6–97.8)
MONOCYTES # BLD: 0.8 K/UL (ref 0.1–1.3)
MONOCYTES NFR BLD: 7 % (ref 4–12)
NEUTS SEG # BLD: 8.6 K/UL (ref 1.7–8.2)
NEUTS SEG NFR BLD: 70 % (ref 43–78)
PLATELET # BLD AUTO: 369 K/UL (ref 150–450)
PMV BLD AUTO: 9.3 FL (ref 10.8–14.1)
POTASSIUM SERPL-SCNC: 4.3 MMOL/L (ref 3.5–5.1)
RBC # BLD AUTO: 3.96 M/UL (ref 4.23–5.67)
SERVICE CMNT-IMP: ABNORMAL
SERVICE CMNT-IMP: NORMAL
SERVICE CMNT-IMP: NORMAL
SODIUM SERPL-SCNC: 133 MMOL/L (ref 136–145)
WBC # BLD AUTO: 12.1 K/UL (ref 4.3–11.1)

## 2017-10-15 PROCEDURE — 90471 IMMUNIZATION ADMIN: CPT

## 2017-10-15 PROCEDURE — 71010 XR CHEST SNGL V: CPT

## 2017-10-15 PROCEDURE — 94760 N-INVAS EAR/PLS OXIMETRY 1: CPT

## 2017-10-15 PROCEDURE — 74011636637 HC RX REV CODE- 636/637: Performed by: INTERNAL MEDICINE

## 2017-10-15 PROCEDURE — 80048 BASIC METABOLIC PNL TOTAL CA: CPT | Performed by: SURGERY

## 2017-10-15 PROCEDURE — 74011250637 HC RX REV CODE- 250/637: Performed by: SURGERY

## 2017-10-15 PROCEDURE — 85025 COMPLETE CBC W/AUTO DIFF WBC: CPT | Performed by: SURGERY

## 2017-10-15 PROCEDURE — 36415 COLL VENOUS BLD VENIPUNCTURE: CPT | Performed by: SURGERY

## 2017-10-15 PROCEDURE — 82962 GLUCOSE BLOOD TEST: CPT

## 2017-10-15 PROCEDURE — 90732 PPSV23 VACC 2 YRS+ SUBQ/IM: CPT | Performed by: NURSE PRACTITIONER

## 2017-10-15 PROCEDURE — 74011250637 HC RX REV CODE- 250/637: Performed by: NURSE PRACTITIONER

## 2017-10-15 PROCEDURE — 99232 SBSQ HOSP IP/OBS MODERATE 35: CPT | Performed by: INTERNAL MEDICINE

## 2017-10-15 PROCEDURE — 74011250636 HC RX REV CODE- 250/636: Performed by: NURSE PRACTITIONER

## 2017-10-15 PROCEDURE — 74011000258 HC RX REV CODE- 258: Performed by: NURSE PRACTITIONER

## 2017-10-15 PROCEDURE — 74011000250 HC RX REV CODE- 250: Performed by: INTERNAL MEDICINE

## 2017-10-15 RX ORDER — SULFAMETHOXAZOLE AND TRIMETHOPRIM 800; 160 MG/1; MG/1
1 TABLET ORAL 2 TIMES DAILY
Qty: 16 TAB | Refills: 0 | Status: SHIPPED | OUTPATIENT
Start: 2017-10-15 | End: 2017-10-23

## 2017-10-15 RX ORDER — OXYCODONE AND ACETAMINOPHEN 5; 325 MG/1; MG/1
TABLET ORAL
Qty: 40 TAB | Refills: 0 | Status: SHIPPED
Start: 2017-10-15 | End: 2017-11-28

## 2017-10-15 RX ORDER — SULFAMETHOXAZOLE AND TRIMETHOPRIM 800; 160 MG/1; MG/1
1 TABLET ORAL 2 TIMES DAILY
Qty: 14 TAB | Refills: 0 | Status: SHIPPED | OUTPATIENT
Start: 2017-10-15 | End: 2017-10-15

## 2017-10-15 RX ADMIN — OXYCODONE HYDROCHLORIDE AND ACETAMINOPHEN 1 TABLET: 10; 325 TABLET ORAL at 16:05

## 2017-10-15 RX ADMIN — Medication 10 ML: at 05:50

## 2017-10-15 RX ADMIN — INSULIN LISPRO 6 UNITS: 100 INJECTION, SOLUTION INTRAVENOUS; SUBCUTANEOUS at 09:06

## 2017-10-15 RX ADMIN — INSULIN LISPRO 8 UNITS: 100 INJECTION, SOLUTION INTRAVENOUS; SUBCUTANEOUS at 13:28

## 2017-10-15 RX ADMIN — PIPERACILLIN SODIUM AND TAZOBACTAM SODIUM 4.5 G: 4; .5 INJECTION, POWDER, LYOPHILIZED, FOR SOLUTION INTRAVENOUS at 00:11

## 2017-10-15 RX ADMIN — OXYCODONE HYDROCHLORIDE AND ACETAMINOPHEN 1 TABLET: 10; 325 TABLET ORAL at 07:40

## 2017-10-15 RX ADMIN — PANTOPRAZOLE SODIUM 40 MG: 40 TABLET, DELAYED RELEASE ORAL at 07:41

## 2017-10-15 RX ADMIN — PIPERACILLIN SODIUM AND TAZOBACTAM SODIUM 4.5 G: 4; .5 INJECTION, POWDER, LYOPHILIZED, FOR SOLUTION INTRAVENOUS at 07:44

## 2017-10-15 RX ADMIN — Medication 10 ML: at 13:30

## 2017-10-15 RX ADMIN — Medication 1 AMPULE: at 09:07

## 2017-10-15 RX ADMIN — STANDARDIZED SENNA CONCENTRATE AND DOCUSATE SODIUM 2 TABLET: 8.6; 5 TABLET, FILM COATED ORAL at 09:08

## 2017-10-15 RX ADMIN — LEVOTHYROXINE SODIUM 75 MCG: 75 TABLET ORAL at 05:48

## 2017-10-15 RX ADMIN — PNEUMOCOCCAL VACCINE POLYVALENT 0.5 ML
25; 25; 25; 25; 25; 25; 25; 25; 25; 25; 25; 25; 25; 25; 25; 25; 25; 25; 25; 25; 25; 25; 25 INJECTION, SOLUTION INTRAMUSCULAR; SUBCUTANEOUS at 16:11

## 2017-10-15 RX ADMIN — METOPROLOL TARTRATE 25 MG: 25 TABLET ORAL at 09:08

## 2017-10-15 RX ADMIN — GABAPENTIN 200 MG: 100 CAPSULE ORAL at 11:48

## 2017-10-15 NOTE — DISCHARGE INSTRUCTIONS
Chest Tube: What to Expect at Home  Your Recovery  A chest tube is placed through the chest wall between two ribs. You had a chest tube put in to help your collapsed lung expand. The tube was removed before you came home. You may have some pain in your chest from the cut (incision) where the tube was put in. For most people, the pain goes away after about 2 weeks. You will have a bandage taped over the wound. Your doctor will remove the bandage and examine the wound in about 2 days. It will take about 3 to 4 weeks for your incision to heal completely. It may leave a small scar that will fade with time. This care sheet gives you a general idea about how long it will take for you to recover. But each person recovers at a different pace. Follow the steps below to feel better as quickly as possible. How can you care for yourself at home? Activity  · Rest when you feel tired. Getting enough sleep will help you recover. · Try to walk each day. Start by walking a little more than you did the day before. Bit by bit, increase the amount you walk. Walking boosts blood flow and helps prevent pneumonia and constipation. · Avoid strenuous activities, such as bicycle riding, jogging, weight lifting, or aerobic exercise, until your doctor says it is okay. · How soon you can return to work or your normal routine depends on what health problems you have. Talk with your doctor about how long it will take you to recover. · You may shower after your bandage is removed. Pat the cut (incision) dry. Do not take a bath for 2 weeks after your chest tube is out, or until your doctor tells you it is okay. · Practice deep breathing exercises as directed by your doctor. Coughing exercises also can help drain fluid out of your chest.  Diet  · You can eat your normal diet. If your stomach is upset, try bland, low-fat foods like plain rice, broiled chicken, toast, and yogurt.   · Drink plenty of fluids (unless your doctor tells you not to). Medicines  · Your doctor will tell you if and when you can restart your medicines. He or she will also give you instructions about taking any new medicines. · If you take blood thinners, such as warfarin (Coumadin), clopidogrel (Plavix), or aspirin, be sure to talk to your doctor. He or she will tell you if and when to start taking those medicines again. Make sure that you understand exactly what your doctor wants you to do. · Be safe with medicines. Take pain medicines exactly as directed. ¨ If the doctor gave you a prescription medicine for pain, take it as prescribed. ¨ If you are not taking a prescription pain medicine, ask your doctor if you can take an over-the-counter medicine. · Take your antibiotics as directed. Do not stop taking them just because you feel better. You need to take the full course of antibiotics. Incision care  · Keep the incision dry as it heals. You will have a bandage over it to help the incision heal and protect it. Your doctor will tell you how to take care of this. Other instructions  · Do not smoke. Smoking makes lung problems worse. If you need help quitting, talk to your doctor about stop-smoking programs and medicines. These can increase your chances of quitting for good. Follow-up care is a key part of your treatment and safety. Be sure to make and go to all appointments, and call your doctor if you are having problems. It's also a good idea to know your test results and keep a list of the medicines you take. When should you call for help? Call 911 anytime you think you may need emergency care. For example, call if:  · You passed out (lost consciousness). · You have severe trouble breathing. · You have sudden chest pain and shortness of breath, or you cough up blood. Call your doctor now or seek immediate medical care if:  · You continue to have trouble breathing. · Your shortness of breath is getting worse.   · Bright red blood soaks through the bandage over your incision. · You have a fever. Watch closely for any changes in your health, and be sure to contact your doctor if:  · You do not get better as expected. Where can you learn more? Go to http://marlyn-barrington.info/. Enter X161 in the search box to learn more about \"Chest Tube: What to Expect at Home. \"  Current as of: March 25, 2017  Content Version: 11.3  © 3003-6552 [employer], [employer]. Care instructions adapted under license by [employer] (which disclaims liability or warranty for this information). If you have questions about a medical condition or this instruction, always ask your healthcare professional. [place] [address] any warranty or liability for your use of this information.

## 2017-10-15 NOTE — PROGRESS NOTES
PLAN:  Right Chest tube x 1 to water seal. DC today  F/u CXR after CT Removal  10/10 thoracentesis cultures grew GNR- continue Zosyn. DC PCA  Lovenox, SCDs, IS, H2/PPI prophylaxis  Possible home later today or in am    ASSESSMENT:  Admit Date: 10/10/2017   3 Days Post-Op  Procedure(s):  RIGHT THORACOTOMY / DECORTICATION . NERVE BLOCK OF INTERCOSTAL SPACES WITH CRYOABLATION      Principal Problem:    Pleural effusion on right (10/10/2017)    Active Problems:    Hypothyroidism (10/10/2017)      Empyema ([facility] (10/10/2017)      Type II diabetes mellitus with nephropathy ([facility] (10/11/2017)       SUBJECTIVE:  Pt awake in bed. No complaints. States ready to go home. Right CT x1 in place - No air leak. Tolerating Diabetic Diet. AF, VSS. OBJECTIVE:  Constitutional: Alert, cooperative, no acute distress; appears stated age   Visit Vitals    /79    Pulse 92    Temp 97.8 °F (36.6 °C)    Resp 19    Ht 6' (1.829 m)    Wt 195 lb (88.5 kg)    SpO2 95%    BMI 26.45 kg/m2     Eyes:Sclera are clear. ENMT: no external lesions gross hearing normal; no obvious neck masses, no ear or lip lesions  CV: RRR. Normal perfusion  Resp: No JVD.  Breathing is  non-labored; no audible wheezing.  Chest tube x 1 in place with dressing c/d/i - 200cc out last 24 hours  GI: soft, non-tender, non-distended     Skin: Staples to Right Chest c/d/i  Musculoskeletal: unremarkable with normal function. No embolic signs or cyanosis.    Neuro:  Oriented; moves all 4; no focal deficits  Psychiatric: normal affect and mood, no memory impairment      Patient Vitals for the past 24 hrs:   BP Temp Pulse Resp SpO2   10/15/17 0814 - - - - 95 %   10/15/17 0700 132/79 97.8 °F (36.6 °C) 92 19 95 %   10/15/17 0349 115/68 98.4 °F (36.9 °C) 90 18 94 %   10/14/17 2300 143/80 98 °F (36.7 °C) 89 18 96 %   10/14/17 1900 110/72 99 °F (37.2 °C) 97 18 94 %   10/14/17 1540 105/71 99.6 °F (37.6 °C) 92 18 97 %   10/14/17 1133 110/55 99.3 °F (37.4 °C) 87 18 97 % Labs:  Recent Labs      10/15/17   0604   WBC  12.1*   HGB  12.9*   PLT  369   NA  133*   K  4.3   CL  97*   CO2  26   BUN  17   CREA  1.10   GLU  298*     10/15/17 Portable view of the chest      COMPARISON: October 14, 2017.     CLINICAL HISTORY: Postop.     FINDINGS:     There is a stable right lower thorax chest tube. Previous right apical chest  tube has been removed. No significant pneumothorax. Patchy densities in the  right lung, decreased compared to prior exam. No consolidation in the left lung. No pulmonary edema. Cardiomediastinal contour, midline sternotomy wires and  surrounding bones are stable. Right-sided skin staples are stable.     IMPRESSION:     Right apical chest tube has been removed. Right basilar chest tube remains. No  pneumothorax thorax is seen on today's study. JONNY RichBC    The patient was seen in conjunction with Dr. Amlo Doyle who independently evaluated the patient, reviewed the chart and agreed with the assessment and plan.

## 2017-10-15 NOTE — PROGRESS NOTES
Kimmy South, NP removed CT # 1. Xeroform gauze/ dry gauze applied to site and secured with tape. Pt tolerated without difficulty. Chest XRay to follow.

## 2017-10-15 NOTE — DISCHARGE SUMMARY
Physician Discharge Summary     Patient ID:  Feliberto Costa  [id number]  73 y.o.  1950    Admit Date: 10/10/2017     HPI: Feliberto Costa is a 73 y.o. male with PMHx of CABG X 4 (2013), DM2, HTN, HLD, hypothyroidism s/p thyroidectomy, asthma who presented for an outpatient thoracentesis today with Pulmonology and was admitted due to finding juarez pus on thoracentesis. Pt reports several week h/o cough, dyspnea, right pleuritic pain, wheezing. Pt presented to PCP 9/25 and was given course of Rocephin and Levaquin. CXR at that time was reportedly clear. Pt returned for follow up in 10/3 with chest discomfort and had repeat CXR which showed right pleural effusion which prompted chest CT demonstrating the same. Pt was given repeat course of Abx and scheduled for outpatient thoracentesis. Pulmonary was able to tap 5cc of juarez pus from right side today. Pt reports wheezing, dyspnea, right pleuritic chest pain aggravated by movement. Pt has seen some improvement over the last week with second course of Abx. On 10/12/17 pt underwent a Right thoracotomy with decortication, Wedge resection of right lower lobe, and Intercostal nerve block with cryoablation.        Discharge Date: 10/15/2017    * Admission Diagnoses: Pleural effusion [J90]    * Discharge Diagnoses:    Hospital Problems as of 10/15/2017  Date Reviewed: 10/12/2017          Codes Class Noted - Resolved POA    Type II diabetes mellitus with nephropathy ([facility] (Chronic) ICD-10-CM: E11.21  ICD-9-CM: 250.40, 583.81  10/11/2017 - Present Yes        Hypothyroidism (Chronic) ICD-10-CM: E03.9  ICD-9-CM: 244.9  10/10/2017 - Present Yes        * (Principal)Pleural effusion on right ICD-10-CM: J90  ICD-9-CM: 511.9  10/10/2017 - Present Yes        Empyema ([facility] ICD-10-CM: J86.9  ICD-9-CM: 510.9  10/10/2017 - Present Yes               Admission Condition: Fair    * Discharge Condition: good    * Procedures: Procedure(s):  RIGHT THORACOTOMY / DECORTICATION . NERVE BLOCK OF INTERCOSTAL SPACES WITH CRYOABLATION      * Hospital Course:   Normal hospital course for this procedure. Consults: General Surgery and Pulmonary/Critical Care    Significant Diagnostic Studies: labs and radiology    * Disposition: Home    Discharge Medications:   Current Discharge Medication List      START taking these medications    Details   oxyCODONE-acetaminophen (PERCOCET) 5-325 mg per tablet 1-2 tabs by mouth every four hours prn pain  Qty: 40 Tab, Refills: 0      trimethoprim-sulfamethoxazole (BACTRIM DS) 160-800 mg per tablet Take 1 Tab by mouth two (2) times a day for 8 days. Qty: 16 Tab, Refills: 0         CONTINUE these medications which have NOT CHANGED    Details   insulin glargine (LANTUS SOLOSTAR) 100 unit/mL (3 mL) inpn INECT 40 UNITS SUBCUTANEOUSLY EVERY EVENING  Qty: 15 Pen, Refills: 1      levothyroxine (SYNTHROID) 75 mcg tablet TAKE ONE TABLET BY MOUTH ONCE DAILY BEFORE  BREAKFAST  Qty: 90 Tab, Refills: 3    Associated Diagnoses: Acquired hypothyroidism      montelukast (SINGULAIR) 10 mg tablet TAKE ONE TABLET BY MOUTH IN THE MORNING FOR  ASTHMA  PREVENTION. Qty: 90 Tab, Refills: 3    Associated Diagnoses: Asthma attack      fluticasone-salmeterol (ADVAIR DISKUS) 250-50 mcg/dose diskus inhaler INHALE ONE DOSE BY MOUTH EVERY 12 HOURS  Qty: 3 Inhaler, Refills: 3    Associated Diagnoses: Asthma attack      insulin aspart (NOVOLOG FLEXPEN) 100 unit/mL inpn INJECT UP TO 10 UNITS SUBCUTANEOUSLY 3 TIMES DAILY  Qty: 15 Pen, Refills: 1      metoprolol tartrate (LOPRESSOR) 25 mg tablet Take 1 Tab by mouth every twelve (12) hours. Qty: 180 Tab, Refills: 3    Associated Diagnoses: Coronary artery disease involving native coronary artery of native heart without angina pectoris      simvastatin (ZOCOR) 40 mg tablet Take 1 Tab by mouth nightly.   Qty: 90 Tab, Refills: 3    Associated Diagnoses: Mixed hyperlipidemia      aspirin delayed-release 81 mg tablet Take 81 mg by mouth every morning. Indications: MYOCARDIAL INFARCTION PREVENTION      PRILOSEC OTC PO Take  by mouth every morning. Indications: for GERD         STOP taking these medications       levoFLOXacin (LEVAQUIN) 750 mg tablet Comments:   Reason for Stopping:               * Follow-up Care/Patient Instructions: Activity: Activity as tolerated  Diet: Diabetic diet  Wound Care: Keep wound clean and dry    Follow-up Information     Follow up With Details Comments [address], [place]   [address] [facility] [address] [place] Regino Call in 1 day Call Monday to schedule to a follw up appt. with  in 7-10 days  Boyd Giordano [address] [facility]  [phone]            Discharge Instructions/Follow-up Plans:   MD Instructions:     Follow-up with Dr. Evangelina Briceno in 7-10 days. Call office on Monday to schedule appt. [phone]  Follow up with Pulmonary as instructed  Keep incisions clean and dry, Tang may remain uncovered. Do not apply lotions, creams or ointments to incisions.     Diet - Diabetic Diet  Activity - ambulate - as tolerated - no heavy lifting >10lb. May shower in 24 hours- no tub baths or soaking/submerging.     No driving while taking narcotics. Do not drink alcohol while taking narcotics. Resume other home medications.     Rx: Percocet 5mg (#40) 1-2 po q 4 hours prn pain   Bactrim 160-800mg 1 po BID x 8 days     If problems or questions arise, please call our office at [phone].     Greater than 30 minutes were spent discharging the patient       Signed:  DEMARCO Arroyo  10/15/2017  4:39 PM

## 2017-10-15 NOTE — PROGRESS NOTES
[facility] CRITICAL CARE OUTREACH NURSE PROGRESS REPORT    SUBJECTIVE: Assessed patient secondary to transfer out of ICU on 10/13. MEWS Score: 1 (10/15/17 0700)  Vitals:    10/15/17 0200 10/15/17 0349 10/15/17 0700 10/15/17 0814   BP:  115/68 132/79    Pulse:  90 92    Resp: 19 18 19    Temp:  98.4 °F (36.9 °C) 97.8 °F (36.6 °C)    SpO2:  94% 95% 95%   Weight:       Height:          LAB DATA:    Recent Labs      10/15/17   0604  10/13/17   1110  10/13/17   0903   NA  133*  133*  145   K  4.3  4.7  2.4*   CL  97*  98  121*   CO2  26  24  14*   AGAP  10  11  10   GLU  298*  257*  155*   BUN  17  29*  18   CREA  1.10  1.10  0.27*   GFRAA  >60  >60  >60   GFRNA  >60  >60  >60   CA  8.9  9.1   --         Recent Labs      10/15/17   0604  10/13/17   0903   WBC  12.1*  9.1   HGB  12.9*  8.4*   HCT  37.8*  24.3*   PLT  369  251          OBJECTIVE: On arrival to room, I found patient to be sitting up in bed, wife at bedside. Pain Assessment  Pain Intensity 1: 0 (10/15/17 0814)  Pain Location 1: Incisional  Pain Intervention(s) 1: Medication (see MAR)  Patient Stated Pain Goal: 0    ASSESSMENT:  Patient is alert and oriented x4, able to verbalize needs. Respirations even and unlabored on room air, breath sounds clear, O2 sat 94%. Last CT to right side removed this AM. Dressing to right side is c/d/i, staples present, no redness or warmth noted, all intact. CXR just done. Pt states he may be discharged if xray looks good. Patient denies any pain or discomfort at this time. PLAN:  Patient has fulfilled outreach visits and will drop off our list. Please call if needs arise.

## 2017-10-15 NOTE — PROGRESS NOTES
END OF SHIFT NOTE:    INTAKE/OUTPUT  10/14 0701 - 10/15 0700  In: 1144.4 [P.O.:790; I.V.:354.4]  Out: 2495 [Urine:2295]  Voiding: YES  Catheter: NO  Drain:              Flatus: Patient does have flatus present. Stool:  0 occurrences. Characteristics:       Emesis: 0 occurrences. Characteristics:        VITAL SIGNS  Patient Vitals for the past 12 hrs:   Temp Pulse Resp BP SpO2   10/15/17 0349 98.4 °F (36.9 °C) 90 18 115/68 94 %   10/14/17 2300 98 °F (36.7 °C) 89 18 143/80 96 %   10/14/17 1900 99 °F (37.2 °C) 97 18 110/72 94 %       Pain Assessment  Pain Intensity 1: 0 (10/15/17 0002)  Pain Location 1: Rib cage  Pain Intervention(s) 1: Encouraged PCA  Patient Stated Pain Goal: 0    Ambulating  Yes    Shift report given to oncoming nurse at the bedside.     Bea Mccann RN

## 2017-10-15 NOTE — PROGRESS NOTES
Date of Outreach Update:  Philip Salgado was seen and assessed. Previous Outreach assessment has been reviewed. There have been no significant clinical changes since the completion of the last dated Outreach assessment. Will continue to follow up per outreach protocol.     Signed By:   Lisa Amaro RN    October 15, 2017 5:39 AM

## 2017-10-15 NOTE — PROGRESS NOTES
Ayan Fix  Admission Date: 10/10/2017             Daily Progress Note: 10/15/2017    The patient's chart is reviewed and the patient is discussed with the staff. 73 y.o. CM presented with a history of asthma and previously been seen in our office 6-8 years ago. Was seen by his PCP, Dr. Willy Luque, on Sept 25th with cough, wheezing, dyspnea and right sided pleuritic chest pain. His CXR then was reportedly clear. He was treated for suspected bronchitis with Rocephin and Levaquin. Was seen 10/3 with ongoing chest discomfort and CXR showed a right pleural effusion and chest CT scan which confirmed. [facility] was elevated 16.1 and he received 2 more daily doses of IM Rocephin and additional 10 day course of Delman Denier was referred to [facility] for thoracentesis. Had ongoing chest pain, productive cough with yellow sputum, fatigue, lack of energy and shortness of breath. Right thoracentesis attempted with 5ml juarez pus and no additional fluid could be removed.  General surgery was consulted for empyema and is S/P right thoracotomy with decortication on 10/12.       Subjective:     Patient is in bed awake and alert. No wheezing or coughing. Down to one chest tube now and had x-ray today.      Review of Systems:  -Fever  -Headaches  -Chest pain +soreness from chest tube site  -Dyspnea,- wheezing, -cough  -Abdominal pain, -constipation  -Leg swelling  All other organ systems grossly normal.        Current Facility-Administered Medications   Medication Dose Route Frequency    oxyCODONE-acetaminophen (PERCOCET 10)  mg per tablet 1 Tab  1 Tab Oral Q4H PRN    HYDROmorphone in NS (DILAUDID) injection 1 mg  1 mg IntraVENous Q4H PRN    senna-docusate (PERICOLACE) 8.6-50 mg per tablet 2 Tab  2 Tab Oral BID    insulin glargine (LANTUS) injection 30 Units  30 Units SubCUTAneous QHS    pantoprazole (PROTONIX) tablet 40 mg  40 mg Oral ACB&D    NUTRITIONAL SUPPORT ELECTROLYTE PRN ORDERS Does Not Apply PRN    enoxaparin (LOVENOX) injection 40 mg  40 mg SubCUTAneous Q24H    alcohol 62% (NOZIN) nasal  1 Ampule  1 Ampule Topical Q12H    gabapentin (NEURONTIN) capsule 200 mg  200 mg Oral TID    ondansetron (ZOFRAN) injection 4 mg  4 mg IntraVENous Q6H PRN    alum-mag hydroxide-simeth (MYLANTA) oral suspension 30 mL  30 mL Oral Q4H PRN    levothyroxine (SYNTHROID) tablet 75 mcg  75 mcg Oral 6am    metoprolol tartrate (LOPRESSOR) tablet 25 mg  25 mg Oral Q12H    montelukast (SINGULAIR) tablet 10 mg  10 mg Oral QHS    simvastatin (ZOCOR) tablet 40 mg  40 mg Oral QHS    sodium chloride (NS) flush 5-10 mL  5-10 mL IntraVENous Q8H    sodium chloride (NS) flush 5-10 mL  5-10 mL IntraVENous PRN    piperacillin-tazobactam (ZOSYN) 4.5 g in 0.9% sodium chloride (MBP/ADV) 100 mL  4.5 g IntraVENous Q8H    albuterol (PROVENTIL VENTOLIN) nebulizer solution 2.5 mg  2.5 mg Nebulization Q6H PRN    budesonide (PULMICORT) 500 mcg/2 ml nebulizer suspension  500 mcg Nebulization BID RT    And    albuterol CONCENTRATE 2.5mg/0.5 mL neb soln  2.5 mg Nebulization Q6H RT    insulin lispro (HUMALOG) injection   SubCUTAneous AC&HS    zolpidem (AMBIEN) tablet 5 mg  5 mg Oral QHS PRN           Objective:     Vitals:    10/14/17 1540 10/14/17 1900 10/14/17 2300 10/15/17 0349   BP: 105/71 110/72 143/80 115/68   Pulse: 92 97 89 90   Resp: 18 18 18 18   Temp: 99.6 °F (37.6 °C) 99 °F (37.2 °C) 98 °F (36.7 °C) 98.4 °F (36.9 °C)   SpO2: 97% 94% 96% 94%   Weight:       Height:         Intake and Output:   10/13 0701 - 10/14 1900  In: 1204.6 [P.O.:610; I.V.:594.6]  Out: 2770 [Urine:2595]  10/14 1901 - 10/15 0700  In: 479 [P.O.:300; I.V.:179]  Out: 1095 [Urine:895]    Physical Exam:   Constitution:  the patient is well developed and in no acute distress  EENMT:  Sclera clear, pupils equal, oral mucosa moist  Respiratory: CTA b/l. And has one chest tube now -- NO air leak.    Cardiovascular:  RRR without M,G,R  Gastrointestinal: soft and non-tender; with positive bowel sounds. Musculoskeletal: warm without cyanosis. There is no lower leg edema. Skin:  no jaundice or rashes, no wounds   Neurologic: no gross neuro deficits     Psychiatric:  alert and oriented x 3    CXR:today only one chest tubes noted lung is up. Better aeration and decreased atelectasis vs yesterday. LAB  Recent Labs      10/14/17   2115  10/14/17   1543  10/14/17   1132  10/14/17   0626  10/13/17   2202   GLUCPOC  377*  286*  357*  231*  209*      Recent Labs      10/13/17   0903   WBC  9.1   HGB  8.4*   HCT  24.3*   PLT  251     Recent Labs      10/13/17   1110  10/13/17   0903  10/12/17   1334   NA  133*  145   --    K  4.7  2.4*   --    CL  98  121*   --    CO2  24  14*   --    GLU  257*  155*   --    BUN  29*  18   --    CREA  1.10  0.27*   --    MG   --    --   2.2   CA  9.1   --    --      Recent Labs      10/12/17   1335   PH  7.34*   PCO2  41   PO2  120*   HCO3  22         Assessment:  (Medical Decision Making)     Hospital Problems  Date Reviewed: 10/12/2017          Codes Class Noted POA    * (Principal)Pleural effusion on right ICD-10-CM: J90  ICD-9-CM: 511.9  10/10/2017 Yes        Empyema ([facility] ICD-10-CM: J86.9  ICD-9-CM: 510.9  10/10/2017 Yes        Type II diabetes mellitus with nephropathy ([facility]) (Chronic) ICD-10-CM: E11.21  ICD-9-CM: 250.40, 583.81  10/11/2017 Yes        Hypothyroidism (Chronic) ICD-10-CM: E03.9  ICD-9-CM: 244.9  10/10/2017 Yes              Plan:  (Medical Decision Making)     --continue ABx -- on Zosyn D6. Candance Huger Pleural fluid now with scant E-coli. --increased lantus to 30 units last night f/u today. --CT per surgery and down to one tube. --pain control   --continue remaining treatment. More than 50% of the time documented was spent in face-to-face contact with the patient and in the care of the patient on the floor/unit where the patient is located.     Ravinder Franco MD

## 2017-10-16 ENCOUNTER — PATIENT OUTREACH (OUTPATIENT)
Dept: CASE MANAGEMENT | Age: 67
End: 2017-10-16

## 2017-10-16 NOTE — PROGRESS NOTES
This note will not be viewable in [address] Transition of Care Discharge Follow-up Questionnaire   Date/Time of Call:   October 16, 2017 1:02PM   What was the patient hospitalized for? Patient hospitalized for Pleural effusion on right             Does the patient understand his/her diagnosis and/or treatment and what happened during the hospitalization? Patient states understanding of diagnosis and treatment during hospitalization. Did the patient receive discharge instructions? Yes     Review any discharge instructions ([facility] Ask patient if they understand these. Do they have any questions? Patient states understanding of discharge instructions, patient states no questions. Were home services ordered (nursing, PT, OT, ST, etc.)? No home health services ordered. If so, has the first visit occurred? If not, why? (Assist with coordination of services if necessary. ) NA         Was any DME ordered? No durable medical equipment ordered. If so, has it been received? If not, why?  (Assist with coordination of arranging DME orders if necessary. ) NA         Complete a review of all medications (new, continued and discontinued meds per the D/C instructions and medication tab in [facility]). Care Coordinator reviewed all medications with patient per [facility], two new medications prescribed. oxyCODONE-acetaminophen (PERCOCET) 5-325 mg per tablet 1-2 tabs by mouth every four hours prn pain     trimethoprim-sulfamethoxazole (BACTRIM DS) 160-800 mg per tablet Take 1 Tab by mouth two (2) times a day for 8 days          Were all new prescriptions filled? If not, why?  (Assist with obtainment of medications if necessary.) Yes, patient states prescriptions filled and currently being taken per doctor's order. Does the patient understand the purpose and dosing instructions for all medications?   (If patient has questions, provide explanation and education.) Patient states understanding of current medications and dosing instructions. Does the patient have any problems in performing ADLs? (If patient is unable to perform ADLs  what is the limiting factor(s)? Do they have a support system that can assist? If no support system is present, discuss possible assistance that they may be able to obtain.) Patient states he is independent with ADL's and ambulation. Patient states he is doing good. Patient states he is eating and resting well and states no discomfort or pain. Does the patient have all follow-up appointments scheduled? 7 day f/up with PCP?    7-14 day f/up with specialist?    If f/up has not been made  what actions has the care coordinator made to accomplish this? Has transportation been arranged? [facility] follow-up should be within 7 days of discharge; all others should have PCP follow-up within 7 days of discharge; follow-ups with other specialists should be within 7-14 days of discharge.) Yes  Care Coordinator educated patient on the importance of scheduling follow up with PCP within 7 days. Patient states he has already scheduled follow up with PCP and states appointment is within 7 days. Patient declined assistance from Care Coordinator to schedule follow up with PCP via 3 way call. Yes        NA             Any other questions or concerns expressed by the patient? No further needs identified, patient instructed to call Care Coordinator if further questions or concerns arise. Schedule next appointment with TIMA Beasley or refer to RN Case Manager/  per the workflow guidelines. When is care coordinators next follow-up call scheduled? If referred for CCM  what RN care manager was the referral assigned?    NA        NA      NA     CYNDIE Call Completed By: PHYLLIS Blood ACO  Care Coordinator

## 2017-10-18 NOTE — ADT AUTH CERT NOTES
Utilization Review           Pleural Effusion - Care Day 4 (10/13/2017) by Blaine Saldana RN        Review Status Review Entered       Completed 10/13/2017       Details              Care Day: 4 Care Date: 10/13/2017 Level of Care: ICU       Guideline Day 3        Clinical Status       (X) * Hemodynamic stability       10/13/2017 1:07 PM EDT by Cherelle Walker         /67, NSR 86              ( ) * CXR or ultrasound shows stability or improvement       ( ) * Infection absent or outpatient treatment planned       (X) * Tachypnea absent       10/13/2017 1:07 PM EDT by Cherelle Walker         RR 16-20              (X) * Oxygen absent or at baseline need       10/13/2017 1:07 PM EDT by Cherelle Walker         RA SAT 96%              (X) * Pain absent or managed       ( ) * Discharge plans and education understood              Activity       (X) * Ambulatory       10/13/2017 1:07 PM EDT by Cherelle Walker         IN ROOM                     Routes       ( ) * Oral hydration, medications, and diet       10/13/2017 1:07 PM EDT by Cherelle Walker         NOZIN, LOVENOX, NEURONTIN,  DILAUDID PCA, LANTUS, SSI, TORADOL IV 15 MG IV Q 6, SYNTHROID, LOPRESSOR PO, SINGULAR, ZOFRAN IV,  ZOSYN 4.5 GMS IV Q 8,  PROTONIX PO BID                     Interventions       ( ) * Chest tube absent or outpatient care arranged       10/13/2017 1:07 PM EDT by Cherelle Walker         X2                     10/13/2017 1:07 PM EDT by Cherelle Walker       Subject: Additional Clinical Information        -- OOB as able, pain control-- ABX: zosyn-- chest tubes and PCA per surgery--transfer to the floor per surgery  Lungs:     rhonchiHeart:   RRR with no Murmur/Rubs/Gallops  Additional Comments:   cxr looks good post op                                   * Milestone                  Pleural Effusion - Care Day 3 (10/12/2017) by Blaine Saldana RN        Review Status Review Entered       Completed 10/13/2017       Details              Care Day: 3 Care Date: 10/12/2017 Level of Care: ICU       Guideline Day 3        Clinical Status       (X) * Hemodynamic stability       10/13/2017 12:56 PM EDT by Alphonso Whittington         NSR 91, /72,              ( ) * CXR or ultrasound shows stability or improvement       10/13/2017 12:56 PM EDT by Alphonso Whittington         Atelectasis right midlung zone status post thoracotomy.              ( ) * Infection absent or outpatient treatment planned       10/13/2017 12:56 PM EDT by Alphonso Whittington         Right empyema, recent pneumonia              (X) * Tachypnea absent       10/13/2017 12:56 PM EDT by Alphonso Whittington         R 15-20              (X) * Oxygen absent or at baseline need       10/13/2017 12:56 PM EDT by Alphonso Whittington         SAT 97% RA              (X) * Pain absent or managed       10/13/2017 12:56 PM EDT by Alphonso Whittington         PAIN 5/10              ( ) * Discharge plans and education understood              Activity       (X) * Ambulatory       10/13/2017 12:56 PM EDT by Alphonso Whittington         IN ROOM                     Routes       ( ) * Oral hydration, medications, and diet       10/13/2017 12:56 PM EDT by Alphonso Whittington         NOZIN, LOVENOX, NEURONTIN, NORCO, DILAUDID PCA, LANTUS, SSI, TORADOL IV, LOPRESSOR PO, ZOSYN 4.5 GMS IV Q 8,  NOVOLIN SQ AND IV, DILAUDID IV, LR 75/HR, PROTONIX PO QD                     Interventions       ( ) * Chest tube absent or outpatient care arranged       10/13/2017 12:56 PM EDT by Alphonso Whittington         X 2              (X) CXR       10/13/2017 12:56 PM EDT by Alphonso Whittington         Atelectasis right midlung zone status post thoracotomy.                     10/13/2017 12:56 PM EDT by Alphonso Whittington       Subject: Additional Clinical Information       DATE OF SURGERY: 10/12/2017  NAME OF SURGEON: Edilberto Dean MD   PREOPERATIVE DIAGNOSIS: Right empyema, recent pneumonia.  POSTOPERATIVE DIAGNOSIS: Right empyema, recent pneumonia.  NAME OF PROCEDURE:   1.  Right thoracotomy with decortication. 2. Wedge resection of right lower lobe. 3. Intercostal nerve block with cryoablation.   INDICATIONS: This is a 50-year-old gentleman who presented with a loculated right pleural effusion consistent with empyema. He reported 2 weeks' history of pneumonia and his sister actually reported much longer history of pneumonia. He had an episode as early as this February. The patient was offered for surgery to decorticate his empyema. He understood the risks and benefits, agreed to proceed. two 36 chest tubes were placed, 1 to the apex, 1 along the diaphragm  WBC: 9.7RBC: 3.97 (L)HGB: 13.2 (L)HCT: 38.3 (L)PLATELET: 156 (H)pH: 7.34 (L)PCO2: 41PO2: 120 (H)BICARBONATE: 22O2 SAT: 98                                   * Milestone

## 2017-10-30 ENCOUNTER — HOSPITAL ENCOUNTER (OUTPATIENT)
Dept: GENERAL RADIOLOGY | Age: 67
Discharge: HOME | End: 2017-10-30
Payer: COMMERCIAL

## 2017-10-30 DIAGNOSIS — J90 PLEURAL EFFUSION: ICD-10-CM

## 2017-10-30 PROCEDURE — 71020 XR CHEST PA LAT: CPT

## 2017-11-17 ENCOUNTER — HOSPITAL ENCOUNTER (OUTPATIENT)
Dept: GENERAL RADIOLOGY | Age: 67
Discharge: HOME | End: 2017-11-17
Payer: COMMERCIAL

## 2017-11-17 DIAGNOSIS — J86.9 EMPYEMA (HCC): ICD-10-CM

## 2017-11-17 PROCEDURE — 71020 XR CHEST PA LAT: CPT

## 2017-12-12 ENCOUNTER — HOSPITAL ENCOUNTER (OUTPATIENT)
Dept: GENERAL RADIOLOGY | Age: 67
Discharge: HOME | End: 2017-12-12
Attending: PHYSICIAN ASSISTANT
Payer: COMMERCIAL

## 2017-12-12 DIAGNOSIS — J90 PLEURAL EFFUSION ON RIGHT: ICD-10-CM

## 2017-12-12 PROCEDURE — 71020 XR CHEST PA LAT: CPT

## 2018-03-15 PROBLEM — J86.9 EMPYEMA (HCC): Status: RESOLVED | Noted: 2017-10-10 | Resolved: 2018-03-15

## 2018-05-08 ENCOUNTER — HOSPITAL ENCOUNTER (OUTPATIENT)
Dept: CT IMAGING | Age: 68
Discharge: HOME | End: 2018-05-08
Attending: INTERNAL MEDICINE
Payer: COMMERCIAL

## 2018-05-08 DIAGNOSIS — J18.9 RECURRENT PNEUMONIA: ICD-10-CM

## 2018-05-08 DIAGNOSIS — J45.901 MODERATE ASTHMA WITH EXACERBATION, UNSPECIFIED WHETHER PERSISTENT: ICD-10-CM

## 2018-05-08 PROCEDURE — 71260 CT THORAX DX C+: CPT

## 2018-05-08 PROCEDURE — 74011636320 HC RX REV CODE- 636/320: Performed by: INTERNAL MEDICINE

## 2018-05-08 PROCEDURE — 74011000258 HC RX REV CODE- 258: Performed by: INTERNAL MEDICINE

## 2018-05-08 RX ORDER — SODIUM CHLORIDE 0.9 % (FLUSH) 0.9 %
10 SYRINGE (ML) INJECTION
Status: ACTIVE | OUTPATIENT
Start: 2018-05-08 | End: 2018-05-08

## 2018-05-08 RX ORDER — SODIUM CHLORIDE 0.9 % (FLUSH) 0.9 %
10 SYRINGE (ML) INJECTION
Status: COMPLETED | OUTPATIENT
Start: 2018-05-08 | End: 2018-05-08

## 2018-05-08 RX ADMIN — IOPAMIDOL 100 ML: 755 INJECTION, SOLUTION INTRAVENOUS at 11:02

## 2018-05-08 RX ADMIN — SODIUM CHLORIDE 100 ML: 900 INJECTION, SOLUTION INTRAVENOUS at 11:02

## 2018-05-08 RX ADMIN — Medication 10 ML: at 11:02

## 2018-06-07 NOTE — PROGRESS NOTES
Critical Care Outreach Nurse Progress Report:    Subjective: In to assess pt secondary to transfer from Unit. MEWS Score: 1 (10/14/17 1540)    Vitals:    10/14/17 0700 10/14/17 1133 10/14/17 1540 10/14/17 1900   BP: 146/79 110/55 105/71 110/72   Pulse: 91 87 92 97   Resp: 18 18 18 18   Temp: 99 °F (37.2 °C) 99.3 °F (37.4 °C) 99.6 °F (37.6 °C) 99 °F (37.2 °C)   SpO2: 95% 97% 97% 94%   Weight:       Height:            Objective: Pt found awake, resting in bed. Pain Intensity 1: 0 (10/14/17 1359)  Pain Location 1: Rib cage  Pain Intervention(s) 1: Encouraged PCA  Patient Stated Pain Goal: 0    Assessment: Pt denies ant pain. #2 CT remains. Incision open to air. Pt states he may go home later tomorrow  If second CT is removed. R/A sat 97%. HR=94. Lungs are diminished but clear. Plan: Will follow per protocol. Tympanostomy Tube Post Op Instructions  Ave Garcia M.D. FACS       DO NOT CALL [facility] ON CALL FOR POSTOPERATIVE PROBLEMS. CALL CLINIC [phone] OR THE  [phone] AND ASK FOR ENT ON CALL      What are the purpose of Tympanostomy tubes?  Tubes are typically placed for two reasons: persistent middle ear fluid that causes hearing loss and possible speech delay, and/or recurrent acute infections.  Tubes are used to drain the ears and provide a way for the ears to equalize the pressure between the outside and the middle ear (the space behind the eardrum). The tubes straddle the ear drum in order to keep a hole connecting the ear canal and middle ear. This decreases the chance of fluid building up in the middle ear and the risk of ear infections.        What should be expected following a Tympanostomy Tube Placement?    1. There may be drainage from your child's ears for up to 7 days after surgery. Initially this may have some blood tinged color and then can be any color. This is normal and will be treated with ear drops. However, if the drainage persists beyond 7 days, please call clinic for further instructions.  2.  If your child had hearing loss before surgery, normal sounds may seem loud  due to the immediate improvement in hearing.  3. Your child may experience nausea, vomiting, and/or fatigue for a few hours after surgery, but this is unusual. Most children are recovered by the time they leave the hospital or surgery center. Your child should be able to progress to a normal diet when you return home.  4. Your child will be prescribed ear drops after surgery. These are meant to keep the tubes clear and help reduce inflammation. If, however, these drops cause a burning sensation, you may stop use at that time.  5. There may be mild ear pain for the first few hours after surgery. This can be treated with acetaminophen or ibuprofen and should resolve by the end of the day.  6. A  post-operative appointment with a repeat hearing test will be scheduled for about three weeks after surgery. Following this the tubes will need to be followed  This will usually be recommended every 6 months, as long as the tubes remain in the ear (generally between 6 - 24 months).  7. NEW GUIDELINES STATE THAT DRY EAR PRECAUTIONS ARE NOT NECESSARY. Most children can swim and get their ears wet in the bath without any problems. However, if your child develops drainage the day after water exposure he/she may be the 1% that needs ear plugs.      What are some reasons you should contact your doctor after surgery?  1. Nausea, vomiting and/or fatigue may occur for a few hours after surgery. However, if the nausea or vomiting lasts for more than 12 hours, you should contact your doctor.  2. Again, drainage of middle ear fluid may be seen for several days following surgery. This fluid can be clear, reddish, or bloody. However, if this drainage continues beyond seven days, your doctor should be contacted.  3. Some fussiness and/or a low grade fever (99 - 101F) may be noted after surgery. But if this fever lasts into the next day or reaches 102F, please contact your doctor.  4. Tubes will prevent ear infections from developing most of the time, but 25% of children (35% of children in day care) with tubes will get an occasional infection. Drainage from the ear will usually indicate an infection and needs to be evaluated. You may call our office for ear drainage if you prefer.   5. Your ear, nose and throat specialist should be contacted if two or more infections occur between scheduled office visits. In this case, further evaluation of the immune system or allergies may be done.

## 2018-06-29 ENCOUNTER — HOSPITAL ENCOUNTER (OUTPATIENT)
Dept: GENERAL RADIOLOGY | Age: 68
Discharge: HOME | End: 2018-06-29
Payer: COMMERCIAL

## 2018-06-29 DIAGNOSIS — J45.909 UNCOMPLICATED ASTHMA, UNSPECIFIED ASTHMA SEVERITY, UNSPECIFIED WHETHER PERSISTENT: Chronic | ICD-10-CM

## 2018-06-29 PROBLEM — J90 PLEURAL EFFUSION ON RIGHT: Status: RESOLVED | Noted: 2017-10-10 | Resolved: 2018-06-29

## 2018-06-29 PROCEDURE — 71046 X-RAY EXAM CHEST 2 VIEWS: CPT

## 2018-08-21 ENCOUNTER — APPOINTMENT (RX ONLY)
Dept: URBAN - METROPOLITAN AREA CLINIC 349 | Facility: CLINIC | Age: 68
Setting detail: DERMATOLOGY
End: 2018-08-21

## 2018-08-21 DIAGNOSIS — D22 MELANOCYTIC NEVI: ICD-10-CM

## 2018-08-21 DIAGNOSIS — L57.0 ACTINIC KERATOSIS: ICD-10-CM

## 2018-08-21 DIAGNOSIS — L82.0 INFLAMED SEBORRHEIC KERATOSIS: ICD-10-CM

## 2018-08-21 PROBLEM — C44.629 SQUAMOUS CELL CARCINOMA OF SKIN OF LEFT UPPER LIMB, INCLUDING SHOULDER: Status: ACTIVE | Noted: 2018-08-21

## 2018-08-21 PROBLEM — D22.5 MELANOCYTIC NEVI OF TRUNK: Status: ACTIVE | Noted: 2018-08-21

## 2018-08-21 PROBLEM — J45.909 UNSPECIFIED ASTHMA, UNCOMPLICATED: Status: ACTIVE | Noted: 2018-08-21

## 2018-08-21 PROCEDURE — ? BIOPSY BY SHAVE METHOD

## 2018-08-21 PROCEDURE — 88305 TISSUE EXAM BY PATHOLOGIST: CPT

## 2018-08-21 PROCEDURE — ? COUNSELING

## 2018-08-21 PROCEDURE — 11100: CPT | Mod: 59

## 2018-08-21 PROCEDURE — 17003 DESTRUCT PREMALG LES 2-14: CPT

## 2018-08-21 PROCEDURE — 17000 DESTRUCT PREMALG LESION: CPT | Mod: 59

## 2018-08-21 PROCEDURE — ? LIQUID NITROGEN

## 2018-08-21 PROCEDURE — 17110 DESTRUCTION B9 LES UP TO 14: CPT

## 2018-08-21 PROCEDURE — ? PATHOLOGY BILLING

## 2018-08-21 PROCEDURE — A4550 SURGICAL TRAYS: HCPCS

## 2018-08-21 PROCEDURE — 99202 OFFICE O/P NEW SF 15 MIN: CPT | Mod: 25

## 2018-08-21 PROCEDURE — ? PRESCRIPTION

## 2018-08-21 RX ORDER — FLUOROURACIL 40 MG/G
CREAM TOPICAL
Qty: 1 | Refills: 0 | Status: ERX | COMMUNITY
Start: 2018-08-21

## 2018-08-21 RX ADMIN — FLUOROURACIL: 40 CREAM TOPICAL at 00:00

## 2018-08-21 ASSESSMENT — LOCATION ZONE DERM
LOCATION ZONE: ARM
LOCATION ZONE: TRUNK
LOCATION ZONE: SCALP

## 2018-08-21 ASSESSMENT — LOCATION SIMPLE DESCRIPTION DERM
LOCATION SIMPLE: UPPER BACK
LOCATION SIMPLE: RIGHT SHOULDER
LOCATION SIMPLE: SCALP

## 2018-08-21 ASSESSMENT — LOCATION DETAILED DESCRIPTION DERM
LOCATION DETAILED: SUPERIOR THORACIC SPINE
LOCATION DETAILED: RIGHT ANTERIOR SHOULDER
LOCATION DETAILED: LEFT CENTRAL PARIETAL SCALP

## 2018-08-21 NOTE — PROCEDURE: BIOPSY BY SHAVE METHOD
Consent: Written consent was obtained and risks were reviewed including but not limited to scarring, infection, bleeding, scabbing, incomplete removal, nerve damage and allergy to anesthesia.
Silver Nitrate Text: The wound bed was treated with silver nitrate after the biopsy was performed.
Cryotherapy Text: The wound bed was treated with cryotherapy after the biopsy was performed.
Was A Bandage Applied: Yes
Electrodesiccation And Curettage Text: The wound bed was treated with electrodesiccation and curettage after the biopsy was performed.
Biopsy Method: Personna blade
Additional Anesthesia Volume In Cc (Will Not Render If 0): 1.5
Bill [id number] For Specimen Handling/Conveyance To Laboratory?: no
Anesthesia Type: 1% lidocaine with 1:100,000 epinephrine and a 1:10 solution of 8.4% sodium bicarbonate
X Size Of Lesion In Cm: 0
Depth Of Biopsy: dermis
Wound Care: Vaseline
Accession #: [id number]
Biopsy Type: H and E
Electrodesiccation Text: The wound bed was treated with electrodesiccation after the biopsy was performed.
Dressing: bandage
Billing Type: Third-Party Bill
Post-Care Instructions: I reviewed with the patient in detail post-care instructions. Patient is to keep the biopsy site dry overnight, and then apply Vaseline  daily until healed. Patient may apply hydrogen peroxide soaks to remove any crusting. After the procedure, the patient was oriented to person, place, and time. Patient denied feeling dizzy, queasy, and and declined further observation after initial 5 minute observation time.
Curettage Text: The wound bed was treated with curettage after the biopsy was performed.
Type Of Destruction Used: Curettage
Anesthesia Volume In Cc (Will Not Render If 0): 1
Notification Instructions: Patient will be notified of biopsy results. However, patient instructed to call the office if not contacted within 2 weeks. After the procedure, the patient was oriented to person, place, and time. Patient denied feeling dizzy, queasy, and and declined further observation after initial 5 minute observation time.
Detail Level: Detailed
Hemostasis: Aluminum Chloride

## 2018-08-21 NOTE — PROCEDURE: PATHOLOGY BILLING
Immunohistochemistry ([id number] and [id number]) billing is not performed here. Please use the Immunohistochemistry Stain Billing plan to accomplish this. Immunohistochemistry ([id number] and [id number]) billing is not performed here. Please use the Immunohistochemistry Stain Billing plan to accomplish this.

## 2018-08-21 NOTE — PROCEDURE: LIQUID NITROGEN
Duration Of Freeze Thaw-Cycle (Seconds): 3
Post-Care Instructions: I reviewed with the patient in detail post-care instructions. Patient is to wear sunprotection, and avoid picking at any of the treated lesions. Pt may apply Vaseline to crusted or scabbing areas.
Include Z78.9 (Other Specified Conditions Influencing Health Status) As An Associated Diagnosis?: Yes
Duration Of Freeze Thaw-Cycle (Seconds): 5-10
Render Post-Care Instructions In Note?: no
Number Of Freeze-Thaw Cycles: 2 freeze-thaw cycles
Medical Necessity Information: It is in your best interest to select a reason for this procedure from the list below. All of these items fulfill various CMS LCD requirements except the new and changing color options.
Detail Level: Detailed
Consent: The patient's consent was obtained including but not limited to risks of crusting, scabbing, blistering, scarring, darker or lighter pigmentary change, recurrence, incomplete removal and infection.
Medical Necessity Clause: This procedure was medically necessary because the lesions that were treated were:

## 2018-09-13 ENCOUNTER — APPOINTMENT (RX ONLY)
Dept: URBAN - METROPOLITAN AREA CLINIC 349 | Facility: CLINIC | Age: 68
Setting detail: DERMATOLOGY
End: 2018-09-13

## 2018-09-13 ENCOUNTER — RX ONLY (OUTPATIENT)
Age: 68
Setting detail: RX ONLY
End: 2018-09-13

## 2018-09-13 PROBLEM — C44.629 SQUAMOUS CELL CARCINOMA OF SKIN OF LEFT UPPER LIMB, INCLUDING SHOULDER: Status: ACTIVE | Noted: 2018-09-13

## 2018-09-13 PROCEDURE — 17263 DSTRJ MAL LES T/A/L 2.1-3.0: CPT

## 2018-09-13 PROCEDURE — A4550 SURGICAL TRAYS: HCPCS

## 2018-09-13 PROCEDURE — ? CURETTAGE AND DESTRUCTION

## 2018-09-13 PROCEDURE — ? COUNSELING

## 2018-09-13 RX ORDER — FLUOROURACIL 2 G/40G
CREAM TOPICAL
Qty: 1 | Refills: 0 | Status: ERX | COMMUNITY
Start: 2018-09-13

## 2018-09-13 NOTE — PROCEDURE: CURETTAGE AND DESTRUCTION
Bill For Surgical Tray: yes
Size Of Lesion After Curettage: 2.1
Anesthesia Volume In Cc: 1.5
Bill As A Line Item Or As Units: Line Item
Anesthesia Type: 1% lidocaine with epinephrine
Additional Information: (Optional): The wound was cleaned, and a pressure dressing was applied.  The patient received detailed post-op instructions.
Cautery Type: electrodesiccation
Post-Care Instructions: I reviewed with the patient in detail post-care instructions. Patient is to keep the area dry for 48 hours, and not to engage in any swimming until the area is healed. Should the patient develop any fevers, chills, bleeding, severe pain patient will contact the office immediately. After the procedure, the patient was oriented to person, place, and time. Patient denied feeling dizzy, queasy, and and declined further observation after initial 5 minute observation time.
Consent was obtained from the patient. The risks, benefits and alternatives to therapy were discussed in detail. Specifically, the risks of infection, scarring, bleeding, prolonged wound healing, nerve injury, incomplete removal, allergy to anesthesia and recurrence were addressed. Alternatives to ED&C, such as: surgical removal and XRT were also discussed.  Prior to the procedure, the treatment site was clearly identified and confirmed by the patient. All components of Universal Protocol/PAUSE Rule completed.
What Was Performed First?: Curettage
Detail Level: Detailed
Add Ability To Document Additional Intralesional Injection: No
Number Of Curettages: 3
Total Volume (Ccs): 1

## 2018-09-24 PROBLEM — E11.21 TYPE II DIABETES MELLITUS WITH NEPHROPATHY (HCC): Chronic | Status: RESOLVED | Noted: 2017-10-11 | Resolved: 2018-09-24

## 2018-10-31 ENCOUNTER — HOSPITAL ENCOUNTER (OUTPATIENT)
Dept: WOUND CARE | Age: 68
Discharge: HOME | End: 2018-10-31
Payer: COMMERCIAL

## 2018-10-31 VITALS
BODY MASS INDEX: 28.77 KG/M2 | HEIGHT: 70 IN | TEMPERATURE: 97.7 F | HEART RATE: 65 BPM | OXYGEN SATURATION: 98 % | DIASTOLIC BLOOD PRESSURE: 68 MMHG | SYSTOLIC BLOOD PRESSURE: 131 MMHG | WEIGHT: 201 LBS

## 2018-10-31 PROBLEM — E11.621 DIABETIC ULCER OF TOE OF RIGHT FOOT ASSOCIATED WITH TYPE 2 DIABETES MELLITUS, WITH FAT LAYER EXPOSED (HCC): Status: ACTIVE | Noted: 2018-10-31

## 2018-10-31 PROBLEM — L97.512 DIABETIC ULCER OF TOE OF RIGHT FOOT ASSOCIATED WITH TYPE 2 DIABETES MELLITUS, WITH FAT LAYER EXPOSED (HCC): Status: ACTIVE | Noted: 2018-10-31

## 2018-10-31 PROCEDURE — 77030035128 HC DRSG ANTIMIC FOAM HYDROFERA HOLL -A

## 2018-10-31 PROCEDURE — 99215 OFFICE O/P EST HI 40 MIN: CPT

## 2018-10-31 PROCEDURE — 97597 DBRDMT OPN WND 1ST 20 CM/<: CPT

## 2018-10-31 NOTE — PROCEDURES
Wound Center Debridement    Patient: Patrick Palmer MRN: [id number]  SSN: [id number]    YOB: 1950  Age: 78 y.o.   Sex: male      October 31, 2018     Procedure Performed By: Mane Medina DPM    Wound: 1 Right  Non Pressure  Toe Breakdown of Skin     Type of Debridement:  Selective      Time Out Taken: Yes    Pain Control: N/A      Type of Tissue Removed: Epidermis    Frequency of Debridements: PRN    Consent in chart     Instrument: Blade     Bleeding: None     Hemostasis: n/a      Procedural Pain: 0    Post-Procedural Pain: 0    Pre-Debridement Measurements: 2.0 x 2.0 x 0.2 cm    Post-Debridement Measurements: 2.8 x 2.0 x 0.2 cm    Surface Area Debrided: 4.0 sq. cm    Response to Procedure: tolerated the procedure well with no complications

## 2018-10-31 NOTE — PROGRESS NOTES
Wound Center Progress Note    Patient: Rosalinda Savage MRN: [id number]  SSN: [id number]    YOB: 1950  Age: 78 y.o. Sex: male      Subjective:     Chief Complaint:  Rosalinda Savage is a 78 y.o. WHITE OR  male who presents with a wound to the right lower extremity at the medial hallux . This began 1 week ago from a rub in new boots. He was evaluated by his PCP who placed him on two antibiotics which he started yesterday. He admits to erythema, edema and serous drainage. He has no pain. He is using neosporin and vaseline to the wound. He wears sandals. He is going golfing today and plays three times a week, using a golf cart. He has diabetes with last A1c 7.2.     History of Present Illness:     Nature: Painless  Location: medial right hallux  Duration: October 2018  Onset: Patient states it started as rubbing from boots  Course Worsening  Aggravating/Alleviating: Worsened with pressure and dependency, improved with compression and rest  Treatment: neosporin    Past Medical History:   Diagnosis Date    Asthma     controlled, no rescue inhaler; on Dulera 2 x d; singulair    CAD (coronary artery disease)     no MI; CABG May 2013    Candida infection     Diabetes ([facility]     type 2, insulin dependent since age 30; avg fasting 120; A1C last= 6.7 in March '14; hypo @ 79; today (7/31/14) = 304 fasting    Diabetes with ulcer of foot ([address] 9/30/2014    GERD (gastroesophageal reflux disease)     daily prilosec    Hypercholesterolemia     Hypertension     controlled with med    NSTEMI, initial episode of care ([facility] 4/26/2013    Pleural effusion on right 10/10/2017    S/P CABG x 4 4/25/2013    Thyroid disease     hypo- on med      Past Surgical History:   Procedure Laterality Date    CHEST SURGERY PROCEDURE UNLISTED Right 10/12/2017    Right Thoractomy    HX COLONOSCOPY  01/16/2012    due date 01/16/2017    HX CORONARY ARTERY BYPASS GRAFT  4/25/13    x4 vessel, Dr. Sabino Ly HX HEART CATHETERIZATION  4/24/2012    multivessel    HX OTHER SURGICAL Right     upper leg hematoma-\"cut it\"     Family History   Problem Relation Age of Onset    Cancer Father     Cancer Sister     Hypertension Mother     Diabetes Mother     Stroke Mother     Heart Disease Brother         MI- had CABG    Diabetes Brother     Heart Disease Sister     Hypertension Sister       Social History     Tobacco Use    Smoking status: Never Smoker    Smokeless tobacco: Never Used   Substance Use Topics    Alcohol use: Yes     Alcohol/week: 3.5 oz     Types: 7 Standard drinks or equivalent per week       Prior to Admission medications    Medication Sig Start Date End Date Taking? Authorizing Provider   clindamycin (CLEOCIN) 300 mg capsule Take 1 Cap by mouth three (3) times daily. 10/30/18   Jigar Cook MD   ciprofloxacin HCl (CIPRO) 500 mg tablet Take 1 Tab by mouth two (2) times a day. 10/30/18   Jigar Cook MD   levothyroxine (SYNTHROID) 75 mcg tablet TAKE ONE TABLET BY MOUTH ONCE DAILY BEFORE  BREAKFAST 9/27/18   Jigar REY MD   albuterol (PROVENTIL HFA, VENTOLIN HFA, PROAIR HFA) 90 mcg/actuation inhaler 2 puffs qid prn 5/16/18   Xiomara Bray NP   insulin aspart U-100 (NOVOLOG FLEXPEN U-100 INSULIN) 100 unit/mL inpn INJECT UP TO 10 UNITS SUBCUTANEOUSLY 3 TIMES DAILY 4/2/18   Jigar Cook MD   insulin glargine (LANTUS SOLOSTAR U-100 INSULIN) 100 unit/mL (3 mL) inpn INJECT 40 UNITS SUBCUTANEOUSLY EVERY EVENING 4/2/18   Jigar REY MD   fluticasone-salmeterol (ADVAIR DISKUS) 250-50 mcg/dose diskus inhaler INHALE ONE DOSE BY MOUTH EVERY 12 HOURS 4/2/18   Jigar REY MD   simvastatin (ZOCOR) 40 mg tablet Take 1 Tab by mouth nightly. 1/15/18   Jigar Cook MD   metoprolol tartrate (LOPRESSOR) 25 mg tablet Take 1 Tab by mouth every twelve (12) hours.  1/15/18   Jigar Cook MD   montelukast (SINGULAIR) 10 mg tablet TAKE ONE TABLET BY MOUTH IN THE MORNING FOR  ASTHMA  PREVENTION. 8/18/17   Matty Alan MD   aspirin delayed-release 81 mg tablet Take 81 mg by mouth every morning. Indications: MYOCARDIAL INFARCTION PREVENTION    Provider, Historical   PRILOSEC OTC PO Take  by mouth every morning. Indications: for GERD    Provider, Historical     No Known Allergies     Review of Systems:  The patient has no difficulty with chest pain or shortness of breath. No fever or chills. No Nausea, vomiting or diarrhea. Comprehensive review of systems was otherwise unremarkable except as noted above. Lab Results   Component Value Date/Time    Hemoglobin A1c 7.4 (H) 10/07/2014 10:15 AM    Hemoglobin A1c (POC) 7.2 (A) 09/27/2018 12:57 PM        Immunization History   Administered Date(s) Administered    Influenza Vaccine 01/01/2018    Influenza Vaccine (Quad) PF 09/24/2018    Pneumococcal Polysaccharide (PPSV-23) 10/15/2017       Body mass index is 28.84 kg/m². Counseling regarding nutrition done: Yes. Recommend Joaquín nutritional supplement for wound healing. Current medications:  Current Outpatient Medications   Medication Sig Dispense Refill    clindamycin (CLEOCIN) 300 mg capsule Take 1 Cap by mouth three (3) times daily. 30 Cap 0    ciprofloxacin HCl (CIPRO) 500 mg tablet Take 1 Tab by mouth two (2) times a day.  20 Tab 0    levothyroxine (SYNTHROID) 75 mcg tablet TAKE ONE TABLET BY MOUTH ONCE DAILY BEFORE  BREAKFAST 90 Tab 3    albuterol (PROVENTIL HFA, VENTOLIN HFA, PROAIR HFA) 90 mcg/actuation inhaler 2 puffs qid prn 1 Inhaler 11    insulin aspart U-100 (NOVOLOG FLEXPEN U-100 INSULIN) 100 unit/mL inpn INJECT UP TO 10 UNITS SUBCUTANEOUSLY 3 TIMES DAILY 15 Pen 3    insulin glargine (LANTUS SOLOSTAR U-100 INSULIN) 100 unit/mL (3 mL) inpn INJECT 40 UNITS SUBCUTANEOUSLY EVERY EVENING 20 Pen 2    fluticasone-salmeterol (ADVAIR DISKUS) 250-50 mcg/dose diskus inhaler INHALE ONE DOSE BY MOUTH EVERY 12 HOURS 3 Inhaler 3    simvastatin (ZOCOR) 40 mg tablet Take 1 Tab by mouth nightly. 90 Tab 3    metoprolol tartrate (LOPRESSOR) 25 mg tablet Take 1 Tab by mouth every twelve (12) hours. 180 Tab 3    montelukast (SINGULAIR) 10 mg tablet TAKE ONE TABLET BY MOUTH IN THE MORNING FOR  ASTHMA  PREVENTION. 90 Tab 3    aspirin delayed-release 81 mg tablet Take 81 mg by mouth every morning. Indications: MYOCARDIAL INFARCTION PREVENTION      PRILOSEC OTC PO Take  by mouth every morning. Indications: for GERD           Objective:     Physical Exam:     Visit Vitals  /68 (BP 1 Location: Left arm, BP Patient Position: At rest)   Pulse 65   Temp 97.7 °F (36.5 °C)   Ht 5' 10\" (1.778 m)   Wt 91.2 kg (201 lb)   SpO2 98%   BMI 28.84 kg/m²       General: well developed, well nourished, pleasant , NAD. Hygiene good  Psych: cooperative. Pleasant. No anxiety or depression. Normal mood and affect. HEENT: Normocephalic, atraumatic. EOMI. Conjunctiva clear. No scleral icterus. Neck: Normal range of motion. No masses. Chest: Good air entry bilaterally. Respirations nonlabored  Cardio: Normal heart sounds,no rubs, murmurs or gallops  Abdomen: Soft, nontender, nondistended, normoactive bowel sounds    Vascular: DP and PT pulses are palpable at 2/4 bilateral. Skin temperature is uniform from proximal to distal bilateral. Hair growth is absent bilateral.    Derm: Nails 1-5 bilateral are within normal limits. No paronychial infections are noted. Skin is atrophic and xerotic. No subcutaneous masses or hyperpigmented lesions are present. Neuro: Epicritic sensation is presentt bilateral. Protective sensation is present with 5.07 SWMF testing to all 10 sites bilateral. Muscle tone and bulk is symmetric bilateral.  Msk: Muscle strength is 5/5 for all prime movers of the foot bilateral. ROM of all joints is full and fluid without pain. No effusions are palpable.      Ulceration to the medial right hallux with fibrotic slough to the base and macerated rim. There is no bone exposed or palpable. Copious serous drainage. No odor. Surrounding erythema and edema that does not extend past the MPJ level. No heat. Diabetic Foot Ulcer/Neuropathic   Is Wound Greater than 1.0 sq cm ? : Yes  Re-Current Wound with Skin Substitue within Last Year : No  X-Ray in Last 3 Months: No  PRICILLA In Last 6 Months : No  Smoking Status: Non- Smoker  Wound Free from Infection : No Culture Done  Is Wound Free of Eschar, Slough , and / or Bio-Stanfield: No  Malignant Process in Wound : No Biopsy Done  Hemoglobin A1Cin Last 3 Months: Yes(7.2)  Type of off Loading: Patient is not off loading     Ulcer Description:   Wound Chest Anterior;Mid (Active)   Number of days: 2015       Wound Leg Left (Active)   Number of days: 2015       Wound Foot Right (Active)   Number of days: 1482       Wound Chest Right (Active)   Number of days: 384       Wound Toe (specify in comments) Right;Medial (Active)   Non-Pressure Injury Partial thickness (epider/derm) 10/31/2018  9:53 AM   Wound Length (cm) 2 cm 10/31/2018  9:53 AM   Wound Width (cm) 2 cm 10/31/2018  9:53 AM   Wound Depth (cm) 0.2 10/31/2018  9:53 AM   Wound Surface area (cm^2) 4 cm^2 10/31/2018  9:53 AM   Condition of Base Slough;Granulation 10/31/2018  9:53 AM   Condition of Edges Open 10/31/2018  9:53 AM   Tissue Type Percent Red 50 10/31/2018  9:53 AM   Tissue Type Percent Yellow 50 10/31/2018  9:53 AM   Drainage Amount  Scant 10/31/2018  9:53 AM   Drainage Color Serous 10/31/2018  9:53 AM   Wound Odor None 10/31/2018  9:53 AM   Periwound Skin Condition Ecchymosis;Edematous; Macerated 10/31/2018  9:53 AM   Cleansing and Cleansing Agents  Normal saline 10/31/2018  9:53 AM   Number of days: 0             Data Review:   No results found for this or any previous visit (from the past 336 hour(s)).      PRICILLA studies in 2014: non compressible bilateral    I independently reviewed recent labs, pathology reports, microbiology reports and radiology studies as noted above. Assessment:     78 y.o. male with diabetic ulceration to the medial right hallux with focal cellulitis    Plan:     Patient was examined and evaluated today. They were educated on the barriers to healing. Culture deferred due to current use of antibiotics. If no improvement next week will culture. ABIs reviewed from 2014 - non compressible and no intervention was recommended. Hydrofera tot he wound bed every other day. He may continue in sandlas to prevent friction. Limit acticty ot ADLS only - no golf. Return in 1 week for serial debridement. Any procedures done today in the wound center are documented in a seperate note in [facility] care made part of this record by reference. Patient instructed on the following: Follow all wound dressing instructions. Do not get dressing or wound wet. May shower if wound can be effectively kept dry. Cast covers may be purchased at [facility] and [facility]. Should you experience increased redness, swelling, pain, foul odor, size of wound(s), or have a temperature over 101 degrees please contact the [address] [place] [address] Road at [phone] or if after hours contact your primary care physician or go to the hospital emergency department. Orders Placed This Encounter    WOUND CARE, DRESSING CHANGE     Cleanse with Normal Saline. Hydrofera Ready: Cut to wound size, place in wound bed, shiny side out. Cover with Gauze, Wrap with Rolled Gauze. Secure with Tape. Change Every Other Day. Return Appointment in 1 Week.      Standing Status:   Standing     Number of Occurrences:   1       Follow-up Information     Follow up With Specialties Details Why Contact Info    [address] In 1 week  [address] [address] [phone]             Signed By: Elizabeth An DPM     October 31, 2018

## 2018-10-31 NOTE — WOUND CARE
[address]  [address], [address]   Phone: [phone]  Fax: [phone]    Patient: Hal Rivas MRN: [id number]  SSN: [id number]    YOB: 1950  Age: 78 y.o. Sex: male       Return Appointment: 1 week with Ramandeep Russell DPM    Instructions: Cleanse with Normal Saline. Hydrofera Ready: Cut to wound size, place in wound bed, shiny side out. Cover with Gauze, Wrap with Rolled Gauze. Secure with Tape. Change Every Other Day. Return Appointment in 1 Week. Should you experience increased redness, swelling, pain, foul odor, size of wound(s), or have a temperature over 101 degrees please contact the [address] [place] [address] Road at [phone] or if after hours contact your primary care physician or go to the hospital emergency department.     Signed By: Isaias Coughlin RN     October 31, 2018

## 2018-10-31 NOTE — WOUND CARE
10/31/18 0953   Wound Toe (specify in comments) Right;Medial   Date First Assessed: 10/31/18   POA: Yes  Wound Type: Diabetic  Location: Toe (specify in comments)  Wound Description (Optional): Great Toe  Orientation: Right;Medial   DRESSING STATUS (None)   DRESSING TYPE (None)   Wound Length (cm) 2 cm   Wound Width (cm) 2 cm   Wound Depth (cm) 0.2   Wound Surface area (cm^2) 4 cm^2   Condition of Base Slough;Granulation   Condition of Edges Open   Tissue Type Percent Red 50   Tissue Type Percent Yellow 50   Drainage Amount  Scant   Drainage Color Serous   Wound Odor None   Periwound Skin Condition Ecchymosis;Edematous; Macerated

## 2018-11-07 ENCOUNTER — HOSPITAL ENCOUNTER (OUTPATIENT)
Dept: WOUND CARE | Age: 68
Discharge: HOME | End: 2018-11-07
Attending: PODIATRIST
Payer: COMMERCIAL

## 2018-11-07 VITALS
TEMPERATURE: 98 F | HEART RATE: 64 BPM | BODY MASS INDEX: 28.86 KG/M2 | WEIGHT: 201.6 LBS | RESPIRATION RATE: 18 BRPM | SYSTOLIC BLOOD PRESSURE: 163 MMHG | DIASTOLIC BLOOD PRESSURE: 89 MMHG | HEIGHT: 70 IN | OXYGEN SATURATION: 99 %

## 2018-11-07 PROCEDURE — 99214 OFFICE O/P EST MOD 30 MIN: CPT

## 2018-11-07 NOTE — WOUND CARE
11/07/18 0825 Wound Toe (specify in comments) Right;Medial  
Date First Assessed: 10/31/18   POA: Yes  Wound Type: Diabetic  Location: Toe (specify in comments)  Wound Description (Optional): Great Toe  Orientation: Right;Medial  
DRESSING STATUS Clean, dry, and intact DRESSING TYPE (Hydrofera Ready, ABD, Rolled Gauze) Non-Pressure Injury Partial thickness (epider/derm) Wound Length (cm) 2 cm Wound Width (cm) 1.8 cm Wound Depth (cm) 0.1 Wound Surface area (cm^2) 3.6 cm^2 Change in Wound Size % 10 Condition of Base Granulation Condition of Edges Open Tissue Type Percent Pink 30 Tissue Type Percent Red 70 Drainage Amount  Scant Drainage Color Serous Wound Odor None Periwound Skin Condition Erythema, blanchable Cleansing and Cleansing Agents  Normal saline Procedure Tolerated Well

## 2018-11-07 NOTE — PROGRESS NOTES
Wound Center Progress Note Patient: Lulu Peterson MRN: [id number]  SSN: [id number] YOB: 1950  Age: 78 y.o. Sex: male Subjective: Chief Complaint: 
Lulu Peterson is a 78 y.o. WHITE OR  male who presents with a wound to the right lower extremity at the medial hallux . This began 1 week ago from a rub in new boots. He continues on oral antibiotics per his PCP who refilled for another week beginning yesterday. He reports resolution of erythema and edema. He denies drainage. History of Present Illness:    
Nature: Painless Location: medial right hallux Duration: October 2018 Onset: Patient states it started as rubbing from boots Course Improving Aggravating/Alleviating: Worsened with pressure and dependency, improved with compression and rest 
Treatment: hydrofera Past Medical History:  
Diagnosis Date  Asthma   
 controlled, no rescue inhaler; on Dulera 2 x d; singulair  CAD (coronary artery disease)   
 no MI; CABG May 2013  Candida infection  Diabetes ([address] type 2, insulin dependent since age 30; avg fasting 120; A1C last= 6.7 in March '14; hypo @ 70; today (7/31/14) = 304 fasting  Diabetes with ulcer of foot ([address] 9/30/2014  GERD (gastroesophageal reflux disease) daily prilosec  Hypercholesterolemia  Hypertension   
 controlled with med  NSTEMI, initial episode of care [facility]) 4/26/2013  Pleural effusion on right 10/10/2017  S/P CABG x 4 4/25/2013  Thyroid disease   
 hypo- on med Past Surgical History:  
Procedure Laterality Date  CHEST SURGERY PROCEDURE UNLISTED Right 10/12/2017 Right Thoractomy  HX COLONOSCOPY  01/16/2012  
 due date 01/16/2017  HX CORONARY ARTERY BYPASS GRAFT  4/25/13  
 x4 vessel, Dr. Maira Madison  HX HEART CATHETERIZATION  4/24/2012  
 multivessel  HX OTHER SURGICAL Right   
 upper leg hematoma-\"cut it\" Family History Problem Relation Age of Onset  Cancer Father  Cancer Sister  Hypertension Mother  Diabetes Mother  Stroke Mother  Heart Disease Brother MI- had CABG  
 Diabetes Brother  Heart Disease Sister  Hypertension Sister Social History Tobacco Use  Smoking status: Never Smoker  Smokeless tobacco: Never Used Substance Use Topics  Alcohol use: Yes Alcohol/week: 3.5 oz Types: 7 Standard drinks or equivalent per week Prior to Admission medications Medication Sig Start Date End Date Taking? Authorizing Provider  
clindamycin (CLEOCIN) 300 mg capsule Take 1 Cap by mouth three (3) times daily. 11/6/18   Casper Chan MD  
ciprofloxacin HCl (CIPRO) 500 mg tablet Take 1 Tab by mouth two (2) times a day. 11/6/18   Casper Chan MD  
levothyroxine (SYNTHROID) 75 mcg tablet TAKE ONE TABLET BY MOUTH ONCE DAILY BEFORE  BREAKFAST 9/27/18   Casper REY MD  
albuterol (PROVENTIL HFA, VENTOLIN HFA, PROAIR HFA) 90 mcg/actuation inhaler 2 puffs qid prn 5/16/18   Ed Falk NP  
insulin aspart U-100 (NOVOLOG FLEXPEN U-100 INSULIN) 100 unit/mL inpn INJECT UP TO 10 UNITS SUBCUTANEOUSLY 3 TIMES DAILY 4/2/18   Casper Chan MD  
insulin glargine (LANTUS SOLOSTAR U-100 INSULIN) 100 unit/mL (3 mL) inpn INJECT 40 UNITS SUBCUTANEOUSLY EVERY EVENING 4/2/18   Casper REY MD  
fluticasone-salmeterol (ADVAIR DISKUS) 250-50 mcg/dose diskus inhaler INHALE ONE DOSE BY MOUTH EVERY 12 HOURS 4/2/18   Casper REY MD  
simvastatin (ZOCOR) 40 mg tablet Take 1 Tab by mouth nightly. 1/15/18   Casper Chan MD  
metoprolol tartrate (LOPRESSOR) 25 mg tablet Take 1 Tab by mouth every twelve (12) hours. 1/15/18   Jamir Louis MD  
montelukast (SINGULAIR) 10 mg tablet TAKE ONE TABLET BY MOUTH IN THE MORNING FOR  ASTHMA  PREVENTION.  8/18/17   Breonna Fam MD  
 aspirin delayed-release 81 mg tablet Take 81 mg by mouth every morning. Indications: MYOCARDIAL INFARCTION PREVENTION    Provider, Historical  
PRILOSEC OTC PO Take  by mouth every morning. Indications: for GERD    Provider, Historical  
 
No Known Allergies Review of Systems: 
The patient has no difficulty with chest pain or shortness of breath. No fever or chills. No Nausea, vomiting or diarrhea. Comprehensive review of systems was otherwise unremarkable except as noted above. Lab Results Component Value Date/Time Hemoglobin A1c 7.4 (H) 10/07/2014 10:15 AM  
 Hemoglobin A1c (POC) 7.2 (A) 09/27/2018 12:57 PM  
  
 
Immunization History Administered Date(s) Administered  Influenza Vaccine 01/01/2018  Influenza Vaccine (Quad) PF 09/24/2018  Pneumococcal Polysaccharide (PPSV-23) 10/15/2017 Body mass index is 28.93 kg/m². Counseling regarding nutrition done: Yes. Recommend Joaquín nutritional supplement for wound healing. Current medications: 
Current Outpatient Medications Medication Sig Dispense Refill  clindamycin (CLEOCIN) 300 mg capsule Take 1 Cap by mouth three (3) times daily. 21 Cap 0  
 ciprofloxacin HCl (CIPRO) 500 mg tablet Take 1 Tab by mouth two (2) times a day. 14 Tab 0  
 levothyroxine (SYNTHROID) 75 mcg tablet TAKE ONE TABLET BY MOUTH ONCE DAILY BEFORE  BREAKFAST 90 Tab 3  
 albuterol (PROVENTIL HFA, VENTOLIN HFA, PROAIR HFA) 90 mcg/actuation inhaler 2 puffs qid prn 1 Inhaler 11  
 insulin aspart U-100 (NOVOLOG FLEXPEN U-100 INSULIN) 100 unit/mL inpn INJECT UP TO 10 UNITS SUBCUTANEOUSLY 3 TIMES DAILY 15 Pen 3  
 insulin glargine (LANTUS SOLOSTAR U-100 INSULIN) 100 unit/mL (3 mL) inpn INJECT 40 UNITS SUBCUTANEOUSLY EVERY EVENING 20 Pen 2  
 fluticasone-salmeterol (ADVAIR DISKUS) 250-50 mcg/dose diskus inhaler INHALE ONE DOSE BY MOUTH EVERY 12 HOURS 3 Inhaler 3  
 simvastatin (ZOCOR) 40 mg tablet Take 1 Tab by mouth nightly.  90 Tab 3  
  metoprolol tartrate (LOPRESSOR) 25 mg tablet Take 1 Tab by mouth every twelve (12) hours. 180 Tab 3  
 montelukast (SINGULAIR) 10 mg tablet TAKE ONE TABLET BY MOUTH IN THE MORNING FOR  ASTHMA  PREVENTION. 90 Tab 3  
 aspirin delayed-release 81 mg tablet Take 81 mg by mouth every morning. Indications: MYOCARDIAL INFARCTION PREVENTION    
 PRILOSEC OTC PO Take  by mouth every morning. Indications: for GERD Objective:  
 
Physical Exam:  
 
Visit Vitals /89 (BP 1 Location: Right arm) Pulse 64 Temp 98 °F (36.7 °C) Resp 18 Ht 5' 10\" (1.778 m) Wt 91.4 kg (201 lb 9.6 oz) SpO2 99% BMI 28.93 kg/m² General: well developed, well nourished, pleasant , NAD. Hygiene good Psych: cooperative. Pleasant. No anxiety or depression. Normal mood and affect. HEENT: Normocephalic, atraumatic. EOMI. Conjunctiva clear. No scleral icterus. Neck: Normal range of motion. No masses. Chest: Good air entry bilaterally. Respirations nonlabored Cardio: Normal heart sounds,no rubs, murmurs or gallops Abdomen: Soft, nontender, nondistended, normoactive bowel sounds Vascular: DP and PT pulses are palpable at 2/4 bilateral. Skin temperature is uniform from proximal to distal bilateral. Hair growth is absent bilateral.   
Derm: Nails 1-5 bilateral are within normal limits. No paronychial infections are noted. Skin is atrophic and xerotic. No subcutaneous masses or hyperpigmented lesions are present. Neuro: Epicritic sensation is presentt bilateral. Protective sensation is present with 5.07 SWMF testing to all 10 sites bilateral. Muscle tone and bulk is symmetric bilateral. 
Msk: Muscle strength is 5/5 for all prime movers of the foot bilateral. ROM of all joints is full and fluid without pain. No effusions are palpable. Ulceration to the medial right hallux with fibrogranular base. There is no bone exposed or palpable. Scant serous drainage. No odor.  Faint erythema remains and no edema or heat is present. Wound smaller in size Diabetic Foot Ulcer/Neuropathic Is Wound Greater than 1.0 sq cm ? : Yes Re-Current Wound with Skin Substitue within Last Year : No 
X-Ray in Last 3 Months: No 
PRICILLA In Last 6 Months : No 
Smoking Status: Non- Smoker Wound Free from Infection : No Culture Done Is Wound Free of [place] , and / or Bio-[place]: Yes Malignant Process in Wound : No Biopsy Done Hemoglobin A1Cin Last 3 Months: Yes Type of off Loading: Patient is not off loading Ulcer Description: Wound Chest Anterior;Mid (Active) Number of days: 2022 Wound Leg Left (Active) Number of days: 2022 Wound Foot Right (Active) Number of days: 5090 Wound Chest Right (Active) Number of days: 391 Wound Toe (specify in comments) Right;Medial (Active) DRESSING STATUS Clean, dry, and intact 11/7/2018  8:25 AM  
Non-Pressure Injury Partial thickness (epider/derm) 11/7/2018  8:25 AM  
Wound Length (cm) 2 cm 11/7/2018  8:25 AM  
Wound Width (cm) 1.8 cm 11/7/2018  8:25 AM  
Wound Depth (cm) 0.1 11/7/2018  8:25 AM  
Wound Surface area (cm^2) 3.6 cm^2 11/7/2018  8:25 AM  
Change in Wound Size % 10 11/7/2018  8:25 AM  
Condition of Base Granulation 11/7/2018  8:25 AM  
Condition of Edges Open 11/7/2018  8:25 AM  
Tissue Type Percent Pink 30 11/7/2018  8:25 AM  
Tissue Type Percent Red 70 11/7/2018  8:25 AM  
Tissue Type Percent Yellow 50 10/31/2018  9:53 AM  
Drainage Amount  Scant 11/7/2018  8:25 AM  
Drainage Color Serous 11/7/2018  8:25 AM  
Wound Odor None 11/7/2018  8:25 AM  
Periwound Skin Condition Erythema, blanchable 11/7/2018  8:25 AM  
Cleansing and Cleansing Agents  Normal saline 11/7/2018  8:25 AM  
Procedure Tolerated Well 11/7/2018  8:25 AM  
Number of days: 7 Data Review: No results found for this or any previous visit (from the past 336 hour(s)).   
 
PRICILLA studies in 2014: non compressible bilateral 
 
 I independently reviewed recent labs, pathology reports, microbiology reports and radiology studies as noted above. Assessment:  
 
78 y.o. male with diabetic ulceration to the medial right hallux. Resolution of cellulitis. Plan:  
 
Patient was examined and evaluated today. They were educated on the barriers to healing. Wound is improving. Hydrofera to the wound bed every other day. He may continue in sandals to prevent friction. Limit acticty ot ADLS only - no golf. Return in 1 week for serial debridement. Any procedures done today in the wound center are documented in a seperate note in connect care made part of this record by reference. Patient instructed on the following: Follow all wound dressing instructions. Do not get dressing or wound wet. May shower if wound can be effectively kept dry. Cast covers may be purchased at [facility] and [facility]. Should you experience increased redness, swelling, pain, foul odor, size of wound(s), or have a temperature over 101 degrees please contact the [address] [place] [address] Road at [phone] or if after hours contact your primary care physician or go to the hospital emergency department. Orders Placed This Encounter  WOUND CARE, DRESSING CHANGE Right Great Toe Cleanse with Normal Saline. Hydrofera Ready: Cut to wound size, place in wound bed, shiny side out. Cover with Gauze, Wrap with Rolled Gauze. Secure with Tape. Change Every Other Day. Standing Status:   Standing Number of Occurrences:   1 Follow-up Information Follow up With Specialties Details Why Contact Info [facility] WOUND C[facility] Wound Care In 1 week For wound re-check [address] [facility] [address]lan[address] [facility] [phone] 
[phone] Signed By: Andrzej De Leon DPM   
 November 7, 2018

## 2018-11-07 NOTE — WOUND CARE
[address] [place] [address] [address] Beatriz Alvarado, [address] Phone: [phone] Fax: [phone] Patient: Carter Babb MRN: [id number]  SSN: [id number] YOB: 1950  Age: 78 y.o. Sex: male Return Appointment: 1 week with Christiana Gifford DPM 
 
Instructions: Right Great Toe Cleanse with Normal Saline. Hydrofera Ready: Cut to wound size, place in wound bed, shiny side out. Cover with Gauze, Wrap with Rolled Gauze.  Secure with Tape.  Change Every Other Day. Should you experience increased redness, swelling, pain, foul odor, size of wound(s), or have a temperature over 101 degrees please contact the [address] [place] [address] Road at [phone] or if after hours contact your primary care physician or go to the hospital emergency department. Signed By: Severo Abbe, RN November 7, 2018

## 2018-11-14 ENCOUNTER — HOSPITAL ENCOUNTER (OUTPATIENT)
Dept: WOUND CARE | Age: 68
Discharge: HOME | End: 2018-11-14
Attending: PODIATRIST
Payer: COMMERCIAL

## 2018-11-14 VITALS
SYSTOLIC BLOOD PRESSURE: 140 MMHG | HEART RATE: 71 BPM | TEMPERATURE: 98.1 F | OXYGEN SATURATION: 99 % | RESPIRATION RATE: 18 BRPM | HEIGHT: 70 IN | DIASTOLIC BLOOD PRESSURE: 75 MMHG | BODY MASS INDEX: 28.98 KG/M2 | WEIGHT: 202.4 LBS

## 2018-11-14 PROCEDURE — 97597 DBRDMT OPN WND 1ST 20 CM/<: CPT

## 2018-11-14 PROCEDURE — 87075 CULTR BACTERIA EXCEPT BLOOD: CPT

## 2018-11-14 PROCEDURE — 87077 CULTURE AEROBIC IDENTIFY: CPT

## 2018-11-14 PROCEDURE — 87205 SMEAR GRAM STAIN: CPT

## 2018-11-14 PROCEDURE — 87186 SC STD MICRODIL/AGAR DIL: CPT

## 2018-11-14 NOTE — WOUND CARE
[address] [place] [address] [place], [address] Phone: [phone] Fax: [phone] Patient: Bartolo Vidal MRN: [id number]  SSN: [id number] YOB: 1950  Age: 78 y.o. Sex: male Return Appointment: 1 week with Derrick Murguia DPM 
 
Instructions: Cleanse wounds with normal saline. Apply Acticoat to wound bases and cover with ABD pad. Wrap with rolled gauze. Change dressing 3 times per week. Return to wound center in 1 week. Continue to limit walking as much as possible. Keep wounds dry in shower. Wound culture completed today. Should you experience increased redness, swelling, pain, foul odor, size of wound(s), or have a temperature over 101 degrees please contact the [address] [place] [address] Road at [phone] or if after hours contact your primary care physician or go to the hospital emergency department. Signed By: Bruce Catalan PT, [address] [facility] November 14, 2018

## 2018-11-14 NOTE — PROCEDURES
Wound Center Debridement    Patient: Karishma Cornejo MRN: [id number]  SSN: [id number]    YOB: 1950  Age: 78 y.o.   Sex: male      November 14, 2018     Procedure Performed By: Lenord Peabody, DPM    Wound:  Right  Non Pressure  Great toe Breakdown of Skin     Type of Debridement:  Selective      Time Out Taken: Yes    Pain Control: Insensate      Type of Tissue Removed: Callus    Frequency of Debridements: PRN    Consent in chart     Instrument: Blade and Forceps     Bleeding: Minimal     Hemostasis: Pressure     Procedural Pain: Insensate    Post-Procedural Pain: Insensate    Pre-Debridement Measurements: 3 x 2.5 x 0.1 cm    Post-Debridement Measurements: 3 x 2.5 x 0.1 cm    Surface Area Debrided: 7.5 sq. cm    Response to Procedure: tolerated the procedure well with no complications

## 2018-11-14 NOTE — PROGRESS NOTES
Wound Center Progress Note Patient: Joseluis Dean MRN: [id number]  SSN: [id number] YOB: 1950  Age: 78 y.o. Sex: male Subjective: Chief Complaint: 
Joseluis Dean is a 78 y.o. WHITE [place]  male who presents with a wound to the right lower extremity at the medial hallux. He admits to hitting the left hallux on a hard object and bleeding. This is covered with a bandaid and he consents to have it evaluated. He denies drainage, erythema and edema bilateral.  
 
History of Present Illness:    
Nature: Painless Location: medial right hallux, dorsal MPJ Duration: October 2018 Onset: Patient states it started as rubbing from boots Course Improving Aggravating/Alleviating: Worsened with pressure and dependency, improved with compression and rest 
Treatment: hydrofera Past Medical History:  
Diagnosis Date  Asthma   
 controlled, no rescue inhaler; on Dulera 2 x d; singulair  CAD (coronary artery disease)   
 no MI; CABG May 2013  Candida infection  Diabetes ([address] type 2, insulin dependent since age 30; avg fasting 120; A1C last= 6.7 in March '14; hypo @ 70; today (7/31/14) = 304 fasting  Diabetes with ulcer of foot ([address] 9/30/2014  GERD (gastroesophageal reflux disease) daily prilosec  Hypercholesterolemia  Hypertension   
 controlled with med  NSTEMI, initial episode of care [facility]) 4/26/2013  Pleural effusion on right 10/10/2017  S/P CABG x 4 4/25/2013  Thyroid disease   
 hypo- on med Past Surgical History:  
Procedure Laterality Date  CHEST SURGERY PROCEDURE UNLISTED Right 10/12/2017 Right Thoractomy  HX COLONOSCOPY  01/16/2012  
 due date 01/16/2017  HX CORONARY ARTERY BYPASS GRAFT  4/25/13  
 x4 vessel, Dr. Cecilio Bagley  HX HEART CATHETERIZATION  4/24/2012  
 multivessel  HX OTHER SURGICAL Right   
 upper leg hematoma-\"cut it\" Family History Problem Relation Age of Onset  Cancer Father  Cancer Sister  Hypertension Mother  Diabetes Mother  Stroke Mother  Heart Disease Brother MI- had CABG  
 Diabetes Brother  Heart Disease Sister  Hypertension Sister Social History Tobacco Use  Smoking status: Never Smoker  Smokeless tobacco: Never Used Substance Use Topics  Alcohol use: Yes Alcohol/week: 3.5 oz Types: 7 Standard drinks or equivalent per week Prior to Admission medications Medication Sig Start Date End Date Taking? Authorizing Provider  
clindamycin (CLEOCIN) 300 mg capsule Take 1 Cap by mouth three (3) times daily. 11/6/18   Yaneth Barone MD  
ciprofloxacin HCl (CIPRO) 500 mg tablet Take 1 Tab by mouth two (2) times a day. 11/6/18   Yaneth Barone MD  
levothyroxine (SYNTHROID) 75 mcg tablet TAKE ONE TABLET BY MOUTH ONCE DAILY BEFORE  BREAKFAST 9/27/18   Yaneth REY MD  
albuterol (PROVENTIL HFA, VENTOLIN HFA, PROAIR HFA) 90 mcg/actuation inhaler 2 puffs qid prn 5/16/18   Marycruz Lynch NP  
insulin aspart U-100 (NOVOLOG FLEXPEN U-100 INSULIN) 100 unit/mL inpn INJECT UP TO 10 UNITS SUBCUTANEOUSLY 3 TIMES DAILY 4/2/18   Yaneth Barone MD  
insulin glargine (LANTUS SOLOSTAR U-100 INSULIN) 100 unit/mL (3 mL) inpn INJECT 40 UNITS SUBCUTANEOUSLY EVERY EVENING 4/2/18   Yaneth REY MD  
fluticasone-salmeterol (ADVAIR DISKUS) 250-50 mcg/dose diskus inhaler INHALE ONE DOSE BY MOUTH EVERY 12 HOURS 4/2/18   Yaneth REY MD  
simvastatin (ZOCOR) 40 mg tablet Take 1 Tab by mouth nightly. 1/15/18   Yaneth Barone MD  
metoprolol tartrate (LOPRESSOR) 25 mg tablet Take 1 Tab by mouth every twelve (12) hours. 1/15/18   Marivel Louis MD  
montelukast (SINGULAIR) 10 mg tablet TAKE ONE TABLET BY MOUTH IN THE MORNING FOR  ASTHMA  PREVENTION.  8/18/17   Dorothy Hernandez MD  
 aspirin delayed-release 81 mg tablet Take 81 mg by mouth every morning. Indications: MYOCARDIAL INFARCTION PREVENTION    Provider, Historical  
PRILOSEC OTC PO Take  by mouth every morning. Indications: for GERD    Provider, Historical  
 
No Known Allergies Review of Systems: 
The patient has no difficulty with chest pain or shortness of breath. No fever or chills. No Nausea, vomiting or diarrhea. Comprehensive review of systems was otherwise unremarkable except as noted above. Lab Results Component Value Date/Time Hemoglobin A1c 7.4 (H) 10/07/2014 10:15 AM  
 Hemoglobin A1c (POC) 7.2 (A) 09/27/2018 12:57 PM  
  
 
Immunization History Administered Date(s) Administered  Influenza Vaccine 01/01/2018  Influenza Vaccine (Quad) PF 09/24/2018  Pneumococcal Polysaccharide (PPSV-23) 10/15/2017 Body mass index is 29.04 kg/m². Counseling regarding nutrition done: Yes. Recommend Joaquín nutritional supplement for wound healing. Current medications: 
Current Outpatient Medications Medication Sig Dispense Refill  clindamycin (CLEOCIN) 300 mg capsule Take 1 Cap by mouth three (3) times daily. 21 Cap 0  
 ciprofloxacin HCl (CIPRO) 500 mg tablet Take 1 Tab by mouth two (2) times a day. 14 Tab 0  
 levothyroxine (SYNTHROID) 75 mcg tablet TAKE ONE TABLET BY MOUTH ONCE DAILY BEFORE  BREAKFAST 90 Tab 3  
 albuterol (PROVENTIL HFA, VENTOLIN HFA, PROAIR HFA) 90 mcg/actuation inhaler 2 puffs qid prn 1 Inhaler 11  
 insulin aspart U-100 (NOVOLOG FLEXPEN U-100 INSULIN) 100 unit/mL inpn INJECT UP TO 10 UNITS SUBCUTANEOUSLY 3 TIMES DAILY 15 Pen 3  
 insulin glargine (LANTUS SOLOSTAR U-100 INSULIN) 100 unit/mL (3 mL) inpn INJECT 40 UNITS SUBCUTANEOUSLY EVERY EVENING 20 Pen 2  
 fluticasone-salmeterol (ADVAIR DISKUS) 250-50 mcg/dose diskus inhaler INHALE ONE DOSE BY MOUTH EVERY 12 HOURS 3 Inhaler 3  
 simvastatin (ZOCOR) 40 mg tablet Take 1 Tab by mouth nightly.  90 Tab 3  
  metoprolol tartrate (LOPRESSOR) 25 mg tablet Take 1 Tab by mouth every twelve (12) hours. 180 Tab 3  
 montelukast (SINGULAIR) 10 mg tablet TAKE ONE TABLET BY MOUTH IN THE MORNING FOR  ASTHMA  PREVENTION. 90 Tab 3  
 aspirin delayed-release 81 mg tablet Take 81 mg by mouth every morning. Indications: MYOCARDIAL INFARCTION PREVENTION    
 PRILOSEC OTC PO Take  by mouth every morning. Indications: for GERD Objective:  
 
Physical Exam:  
 
Visit Vitals /75 (BP 1 Location: Left arm) Pulse 71 Temp 98.1 °F (36.7 °C) Resp 18 Ht 5' 10\" (1.778 m) Wt 91.8 kg (202 lb 6.4 oz) SpO2 99% BMI 29.04 kg/m² General: well developed, well nourished, pleasant , NAD. Hygiene good Psych: cooperative. Pleasant. No anxiety or depression. Normal mood and affect. HEENT: Normocephalic, atraumatic. EOMI. Conjunctiva clear. No scleral icterus. Neck: Normal range of motion. No masses. Chest: Good air entry bilaterally. Respirations nonlabored Cardio: Normal heart sounds,no rubs, murmurs or gallops Abdomen: Soft, nontender, nondistended, normoactive bowel sounds Vascular: DP and PT pulses are palpable at 2/4 bilateral. Skin temperature is uniform from proximal to distal bilateral. Hair growth is absent bilateral.   
Derm: Nails 1-5 bilateral are within normal limits. No paronychial infections are noted. Skin is atrophic and xerotic. No subcutaneous masses or hyperpigmented lesions are present. Neuro: Epicritic sensation is presentt bilateral. Protective sensation is present with 5.07 SWMF testing to all 10 sites bilateral. Muscle tone and bulk is symmetric bilateral. 
Msk: Muscle strength is 5/5 for all prime movers of the foot bilateral. ROM of all joints is full and fluid without pain. No effusions are palpable. Ulceration to the medial right hallux with fibrogranular base. There is no bone exposed or palpable. Increased serous drainage. No odor.  Faint erythema remains and no edema or heat is present. New ulceration to the dorsal lef t1st MPJ with palpable EHL tendon. No erythema, edema or drainage. Diabetic Foot Ulcer/Neuropathic Is Wound Greater than 1.0 sq cm ? : Yes Re-Current Wound with Skin Substitue within Last Year : No 
X-Ray in Last 3 Months: No 
PRICILLA In Last 6 Months : No 
Smoking Status: Non- Smoker Wound Free from Infection : No Culture Done Is Wound Free of [place] , and / or Bio-[place]: No 
Malignant Process in Wound : No Biopsy Done Hemoglobin A1Cin Last 3 Months: Yes Type of off Loading: Other type of off loading Ulcer Description: Wound Chest Anterior;Mid (Active) Number of days: 2029 Wound Leg Left (Active) Number of days: 2029 Wound Foot Right (Active) Number of days: 1600 Wound Chest Right (Active) Number of days: 398 Wound Toe (specify in comments) Right;Medial (Active) DRESSING STATUS Clean, dry, and intact 11/14/2018 10:07 AM  
Non-Pressure Injury Full thickness (subcut/muscle) 11/14/2018 10:07 AM  
Wound Length (cm) 3 cm 11/14/2018 10:07 AM  
Wound Width (cm) 2.5 cm 11/14/2018 10:07 AM  
Wound Depth (cm) 0.1 11/14/2018 10:07 AM  
Wound Surface area (cm^2) 7.5 cm^2 11/14/2018 10:07 AM  
Change in Wound Size % -87.5 11/14/2018 10:07 AM  
Condition of Base Granulation;Slough 11/14/2018 10:07 AM  
Condition of Edges Open 11/7/2018  8:25 AM  
Tissue Type Percent Pink 30 11/7/2018  8:25 AM  
Tissue Type Percent Red 25 11/14/2018 10:07 AM  
Tissue Type Percent Yellow 75 11/14/2018 10:07 AM  
Drainage Amount  Small  11/14/2018 10:07 AM  
Drainage Color Serosanguinous 11/14/2018 10:07 AM  
Wound Odor None 11/14/2018 10:07 AM  
Periwound Skin Condition Erythema, blanchable 11/14/2018 10:07 AM  
Cleansing and Cleansing Agents  Normal saline 11/14/2018 10:07 AM  
Procedure Tolerated Well 11/14/2018 10:07 AM  
Number of days: 14 Wound Toe (specify in comments) Left;Dorsal (Active) DRESSING STATUS Clean, dry, and intact 11/14/2018 10:07 AM  
Non-Pressure Injury Full thickness (subcut/muscle) 11/14/2018 10:07 AM  
Wound Length (cm) 0.1 cm 11/14/2018 10:07 AM  
Wound Width (cm) 0.8 cm 11/14/2018 10:07 AM  
Wound Depth (cm) 0.2 11/14/2018 10:07 AM  
Wound Surface area (cm^2) 0.08 cm^2 11/14/2018 10:07 AM  
Tissue Type Percent Red 100 11/14/2018 10:07 AM  
Drainage Amount  Small  11/14/2018 10:07 AM  
Drainage Color Serosanguinous 11/14/2018 10:07 AM  
Wound Odor None 11/14/2018 10:07 AM  
Cleansing and Cleansing Agents  Normal saline 11/14/2018 10:07 AM  
Procedure Tolerated Well 11/14/2018 10:07 AM  
Number of days: 0 Data Review: No results found for this or any previous visit (from the past 336 hour(s)). PRICILLA studies in 2014: non compressible bilateral 
 
I independently reviewed recent labs, pathology reports, microbiology reports and radiology studies as noted above. Assessment:  
 
78 y.o. male with diabetic ulceration to the medial right hallux. Resolution of cellulitis. Plan:  
 
Patient was examined and evaluated today. They were educated on the barriers to healing. Culture taken right hallux due to increased drainage. Acticoat to both wounds. Activity reduction. Return in 1 week for serial debridement. Any procedures done today in the wound center are documented in a seperate note in [facility] care made part of this record by reference. Patient instructed on the following: Follow all wound dressing instructions. Do not get dressing or wound wet. May shower if wound can be effectively kept dry. Cast covers may be purchased at [facility] and [facility]. Should you experience increased redness, swelling, pain, foul odor, size of wound(s), or have a temperature over 101 degrees please contact the [address] [place] [address] Road at [phone] or if after hours contact your primary care physician or go to the hospital emergency department. Orders Placed This Encounter  CULTURE, WOUND W GRAM STAIN Standing Status:   Standing Number of Occurrences:   1  CULTURE, ANAEROBIC Left great toe Standing Status:   Standing Number of Occurrences:   1  
 WOUND CARE, DRESSING CHANGE Cleanse wounds with normal saline. Apply Acticoat to wound bases and cover with ABD pad. Wrap with rolled gauze. Change dressing 3 times per week. Return to wound center in 1 week. Continue to limit walking as much as possible. Keep wounds dry in shower. Wound culture completed today. Standing Status:   Standing Number of Occurrences:   1 Follow-up Information Follow up With Specialties Details Why Contact Info [facility] Wound Care In 1 week For wound re-check [address]my [address] [phone] Signed By: Cassidy Botello DPM   
 November 14, 2018

## 2018-11-14 NOTE — PROGRESS NOTES
11/14/18 1007 Wound Toe (specify in comments) Right;Medial  
Date First Assessed: 10/31/18   POA: Yes  Wound Type: Diabetic  Location: Toe (specify in comments)  Wound Description (Optional): Great Toe  Orientation: Right;Medial  
DRESSING STATUS Clean, dry, and intact DRESSING TYPE (Hydrofera blue ready, dry gauze, rolled gauze) Non-Pressure Injury Full thickness (subcut/muscle) Wound Length (cm) 3 cm Wound Width (cm) 2.5 cm Wound Depth (cm) 0.1 Wound Surface area (cm^2) 7.5 cm^2 Change in Wound Size % -87.5 Condition of Base Granulation;Slough Tissue Type Percent Red 25 Tissue Type Percent Yellow 75 Drainage Amount  Small Drainage Color Serosanguinous Wound Odor None Periwound Skin Condition Erythema, blanchable Cleansing and Cleansing Agents  Normal saline

## 2018-11-14 NOTE — PROGRESS NOTES
11/14/18 1007 Wound Toe (specify in comments) Right;Medial  
Date First Assessed: 10/31/18   POA: Yes  Wound Type: Diabetic  Location: Toe (specify in comments)  Wound Description (Optional): Great Toe  Orientation: Right;Medial  
DRESSING STATUS Clean, dry, and intact DRESSING TYPE (Hydrofera blue ready, dry gauze, rolled gauze) Non-Pressure Injury Full thickness (subcut/muscle) Wound Length (cm) 3 cm Wound Width (cm) 2.5 cm Wound Depth (cm) 0.1 Wound Surface area (cm^2) 7.5 cm^2 Change in Wound Size % -87.5 Condition of Base Granulation;Slough Tissue Type Percent Red 25 Tissue Type Percent Yellow 75 Drainage Amount  Small Drainage Color Serosanguinous Wound Odor None Periwound Skin Condition Erythema, blanchable Cleansing and Cleansing Agents  Normal saline Wound Toe (specify in comments) Left;Dorsal  
Date First Assessed/Time First Assessed: 11/14/18 1022   Location: Toe (specify in comments)  Wound Description (Optional): Great toe  Orientation: Left;Dorsal  
DRESSING STATUS Clean, dry, and intact DRESSING TYPE (Bandaid) Non-Pressure Injury Full thickness (subcut/muscle) Wound Length (cm) 0.1 cm Wound Width (cm) 0.8 cm Wound Depth (cm) 0.2 Wound Surface area (cm^2) 0.08 cm^2 Tissue Type Percent Red 100 Drainage Amount  Small Drainage Color Serosanguinous Wound Odor None Cleansing and Cleansing Agents  Normal saline

## 2018-11-17 LAB
BACTERIA SPEC CULT: ABNORMAL
GRAM STN SPEC: ABNORMAL
GRAM STN SPEC: ABNORMAL
SERVICE CMNT-IMP: ABNORMAL

## 2018-11-21 ENCOUNTER — HOSPITAL ENCOUNTER (OUTPATIENT)
Dept: WOUND CARE | Age: 68
Discharge: HOME | End: 2018-11-21
Attending: PODIATRIST
Payer: COMMERCIAL

## 2018-11-21 VITALS
BODY MASS INDEX: 29.01 KG/M2 | WEIGHT: 202.6 LBS | DIASTOLIC BLOOD PRESSURE: 66 MMHG | RESPIRATION RATE: 18 BRPM | OXYGEN SATURATION: 98 % | SYSTOLIC BLOOD PRESSURE: 129 MMHG | HEIGHT: 70 IN | TEMPERATURE: 98.1 F | HEART RATE: 64 BPM

## 2018-11-21 LAB
BACTERIA SPEC CULT: NORMAL
SERVICE CMNT-IMP: NORMAL

## 2018-11-21 PROCEDURE — 97597 DBRDMT OPN WND 1ST 20 CM/<: CPT

## 2018-11-21 RX ORDER — AMPICILLIN 500 MG/1
500 CAPSULE ORAL
Qty: 40 CAP | Refills: 0 | Status: SHIPPED | OUTPATIENT
Start: 2018-11-21 | End: 2018-12-12

## 2018-11-21 RX ORDER — LEVOFLOXACIN 500 MG/1
500 TABLET, FILM COATED ORAL DAILY
Qty: 20 TAB | Refills: 0 | Status: SHIPPED | OUTPATIENT
Start: 2018-11-21 | End: 2018-12-12

## 2018-11-21 NOTE — PROGRESS NOTES
11/21/18 1004 Wound Toe (specify in comments) Left;Dorsal  
Date First Assessed/Time First Assessed: 11/14/18 1022   Location: Toe (specify in comments)  Wound Description (Optional): Great toe  Orientation: Left;Dorsal  
DRESSING STATUS Clean, dry, and intact DRESSING TYPE (Acticoat, dry gauze, rolled gauze) Non-Pressure Injury Full thickness (subcut/muscle) Wound Length (cm) 0.1 cm Wound Width (cm) 0.6 cm Wound Depth (cm) 0.1 Wound Surface area (cm^2) 0.06 cm^2 Change in Wound Size % 25 Tissue Type Percent Red 100 Drainage Amount  Scant Drainage Color Serosanguinous Wound Odor None Cleansing and Cleansing Agents  Normal saline Wound Toe (specify in comments) Right;Medial  
Date First Assessed: 10/31/18   POA: Yes  Wound Type: Diabetic  Location: Toe (specify in comments)  Wound Description (Optional): Great Toe  Orientation: Right;Medial  
DRESSING STATUS Clean, dry, and intact DRESSING TYPE (Acticoat, dry gauze, rolled gauze) Non-Pressure Injury Full thickness (subcut/muscle) Wound Length (cm) 2.5 cm Wound Width (cm) 1.8 cm Wound Depth (cm) 0.1 Wound Surface area (cm^2) 4.5 cm^2 Change in Wound Size % -12.5 Condition of Base Granulation;Slough Tissue Type Percent Red 50 Tissue Type Percent Yellow 50 Drainage Amount  Small Drainage Color Serosanguinous Wound Odor None Periwound Skin Condition Erythema, blanchable Cleansing and Cleansing Agents  Normal saline

## 2018-11-21 NOTE — PROGRESS NOTES
Wound Center Progress Note Patient: Patrick Palmer MRN: [id number]  SSN: [id number] YOB: 1950  Age: 78 y.o. Sex: male Subjective: Chief Complaint: 
Patrick Palmer is a 78 y.o. WHITE OR  male who presents with a wound to the right lower extremity at the medial hallux and overlying the EHL left hallux. He admits to less drainage and reduced erythema. History of Present Illness:    
Nature: Painless Location: medial right hallux, dorsal MPJ Duration: October 2018 Onset: Patient states it started as rubbing from boots Course Improving Aggravating/Alleviating: Worsened with pressure and dependency, improved with compression and rest 
Treatment: acticoat Past Medical History:  
Diagnosis Date  Asthma   
 controlled, no rescue inhaler; on Dulera 2 x d; singulair  CAD (coronary artery disease)   
 no MI; CABG May 2013  Candida infection  Diabetes ([address] type 2, insulin dependent since age 30; avg fasting 120; A1C last= 6.7 in March '14; hypo @ 70; today (7/31/14) = 304 fasting  Diabetes with ulcer of foot ([address] 9/30/2014  GERD (gastroesophageal reflux disease) daily prilosec  Hypercholesterolemia  Hypertension   
 controlled with med  NSTEMI, initial episode of care [facility]) 4/26/2013  Pleural effusion on right 10/10/2017  S/P CABG x 4 4/25/2013  Thyroid disease   
 hypo- on med Past Surgical History:  
Procedure Laterality Date  CHEST SURGERY PROCEDURE UNLISTED Right 10/12/2017 Right Thoractomy  HX COLONOSCOPY  01/16/2012  
 due date 01/16/2017  HX CORONARY ARTERY BYPASS GRAFT  4/25/13  
 x4 vessel, Dr. Maite Jennings  HX HEART CATHETERIZATION  4/24/2012  
 multivessel  HX OTHER SURGICAL Right   
 upper leg hematoma-\"cut it\" Family History Problem Relation Age of Onset  Cancer Father  Cancer Sister  Hypertension Mother  Diabetes Mother  Stroke Mother  Heart Disease Brother MI- had CABG  
 Diabetes Brother  Heart Disease Sister  Hypertension Sister Social History Tobacco Use  Smoking status: Never Smoker  Smokeless tobacco: Never Used Substance Use Topics  Alcohol use: Yes Alcohol/week: 3.5 oz Types: 7 Standard drinks or equivalent per week Prior to Admission medications Medication Sig Start Date End Date Taking? Authorizing Provider  
levoFLOXacin (LEVAQUIN) 500 mg tablet Take 1 Tab by mouth daily. 11/21/18  Yes Simeon Snider DPM  
ampicillin (PRINCIPEN) 500 mg capsule Take 1 Cap by mouth Before breakfast, lunch, dinner and at bedtime. 11/21/18  Yes Simeon Snider DPM  
montelukast (SINGULAIR) 10 mg tablet TAKE ONE TABLET BY MOUTH IN THE MORNING FOR  ASTHMA  PREVENTION. 11/19/18   Nik Cruz MD  
levothyroxine (SYNTHROID) 75 mcg tablet TAKE ONE TABLET BY MOUTH ONCE DAILY BEFORE  BREAKFAST 9/27/18   Nik REY MD  
albuterol (PROVENTIL HFA, VENTOLIN HFA, PROAIR HFA) 90 mcg/actuation inhaler 2 puffs qid prn 5/16/18   Chantell Soares NP  
insulin aspart U-100 (NOVOLOG FLEXPEN U-100 INSULIN) 100 unit/mL inpn INJECT UP TO 10 UNITS SUBCUTANEOUSLY 3 TIMES DAILY 4/2/18   Nik Cruz MD  
insulin glargine (LANTUS SOLOSTAR U-100 INSULIN) 100 unit/mL (3 mL) inpn INJECT 40 UNITS SUBCUTANEOUSLY EVERY EVENING 4/2/18   Nik REY MD  
fluticasone-salmeterol (ADVAIR DISKUS) 250-50 mcg/dose diskus inhaler INHALE ONE DOSE BY MOUTH EVERY 12 HOURS 4/2/18   Nik REY MD  
simvastatin (ZOCOR) 40 mg tablet Take 1 Tab by mouth nightly. 1/15/18   Nik Cruz MD  
metoprolol tartrate (LOPRESSOR) 25 mg tablet Take 1 Tab by mouth every twelve (12) hours. 1/15/18   Nik Cruz MD  
aspirin delayed-release 81 mg tablet Take 81 mg by mouth every morning. Indications: MYOCARDIAL INFARCTION PREVENTION    Provider, Historical  
PRILOSEC OTC PO Take  by mouth every morning. Indications: for GERD    Provider, Historical  
 
No Known Allergies Review of Systems: 
The patient has no difficulty with chest pain or shortness of breath. No fever or chills. No Nausea, vomiting or diarrhea. Comprehensive review of systems was otherwise unremarkable except as noted above. Lab Results Component Value Date/Time Hemoglobin A1c 7.4 (H) 10/07/2014 10:15 AM  
 Hemoglobin A1c (POC) 7.2 (A) 09/27/2018 12:57 PM  
  
 
Immunization History Administered Date(s) Administered  Influenza Vaccine 01/01/2018  Influenza Vaccine (Quad) PF 09/24/2018  Pneumococcal Polysaccharide (PPSV-23) 10/15/2017 Body mass index is 29.07 kg/m². Counseling regarding nutrition done: Yes. Recommend Joaquín nutritional supplement for wound healing. Current medications: 
Current Outpatient Medications Medication Sig Dispense Refill  levoFLOXacin (LEVAQUIN) 500 mg tablet Take 1 Tab by mouth daily. 20 Tab 0  
 ampicillin (PRINCIPEN) 500 mg capsule Take 1 Cap by mouth Before breakfast, lunch, dinner and at bedtime. 40 Cap 0  
 montelukast (SINGULAIR) 10 mg tablet TAKE ONE TABLET BY MOUTH IN THE MORNING FOR  ASTHMA  PREVENTION.  90 Tab 3  
 levothyroxine (SYNTHROID) 75 mcg tablet TAKE ONE TABLET BY MOUTH ONCE DAILY BEFORE  BREAKFAST 90 Tab 3  
 albuterol (PROVENTIL HFA, VENTOLIN HFA, PROAIR HFA) 90 mcg/actuation inhaler 2 puffs qid prn 1 Inhaler 11  
 insulin aspart U-100 (NOVOLOG FLEXPEN U-100 INSULIN) 100 unit/mL inpn INJECT UP TO 10 UNITS SUBCUTANEOUSLY 3 TIMES DAILY 15 Pen 3  
 insulin glargine (LANTUS SOLOSTAR U-100 INSULIN) 100 unit/mL (3 mL) inpn INJECT 40 UNITS SUBCUTANEOUSLY EVERY EVENING 20 Pen 2  
 fluticasone-salmeterol (ADVAIR DISKUS) 250-50 mcg/dose diskus inhaler INHALE ONE DOSE BY MOUTH EVERY 12 HOURS 3 Inhaler 3  
  simvastatin (ZOCOR) 40 mg tablet Take 1 Tab by mouth nightly. 90 Tab 3  
 metoprolol tartrate (LOPRESSOR) 25 mg tablet Take 1 Tab by mouth every twelve (12) hours. 180 Tab 3  
 aspirin delayed-release 81 mg tablet Take 81 mg by mouth every morning. Indications: MYOCARDIAL INFARCTION PREVENTION    
 PRILOSEC OTC PO Take  by mouth every morning. Indications: for GERD Objective:  
 
Physical Exam:  
 
Visit Vitals /66 (BP 1 Location: Right arm) Pulse 64 Temp 98.1 °F (36.7 °C) Resp 18 Ht 5' 10\" (1.778 m) Wt 91.9 kg (202 lb 9.6 oz) SpO2 98% BMI 29.07 kg/m² General: well developed, well nourished, pleasant , NAD. Hygiene good Psych: cooperative. Pleasant. No anxiety or depression. Normal mood and affect. HEENT: Normocephalic, atraumatic. EOMI. Conjunctiva clear. No scleral icterus. Neck: Normal range of motion. No masses. Chest: Good air entry bilaterally. Respirations nonlabored Cardio: Normal heart sounds,no rubs, murmurs or gallops Abdomen: Soft, nontender, nondistended, normoactive bowel sounds Vascular: DP and PT pulses are palpable at 2/4 bilateral. Skin temperature is uniform from proximal to distal bilateral. Hair growth is absent bilateral.   
Derm: Nails 1-5 bilateral are within normal limits. No paronychial infections are noted. Skin is atrophic and xerotic. No subcutaneous masses or hyperpigmented lesions are present. Neuro: Epicritic sensation is presentt bilateral. Protective sensation is present with 5.07 SWMF testing to all 10 sites bilateral. Muscle tone and bulk is symmetric bilateral. 
Msk: Muscle strength is 5/5 for all prime movers of the foot bilateral. ROM of all joints is full and fluid without pain. No effusions are palpable. Ulceration to the medial right hallux with fibrogranular base. There is no bone exposed or palpable. Decreased serous drainage. No odor. Faint erythema with no edema or heat is present.  Ulceration to the dorsal left 1st MPJ with no palpable EHL tendon remaining. No erythema, edema or drainage. Ulcer Description: Wound Chest Anterior;Mid (Active) Number of days: 2036 Wound Leg Left (Active) Number of days: 2036 Wound Foot Right (Active) Number of days: 1503 Wound Chest Right (Active) Number of days: 405 Wound Toe (specify in comments) Right;Medial (Active) DRESSING STATUS Clean, dry, and intact 11/21/2018 10:04 AM  
Non-Pressure Injury Full thickness (subcut/muscle) 11/21/2018 10:04 AM  
Wound Length (cm) 2.5 cm 11/21/2018 10:04 AM  
Wound Width (cm) 1.8 cm 11/21/2018 10:04 AM  
Wound Depth (cm) 0.1 11/21/2018 10:04 AM  
Wound Surface area (cm^2) 4.5 cm^2 11/21/2018 10:04 AM  
Change in Wound Size % -12.5 11/21/2018 10:04 AM  
Condition of Base Granulation;Slough 11/21/2018 10:04 AM  
Condition of Edges Open 11/7/2018  8:25 AM  
Tissue Type Percent Pink 30 11/7/2018  8:25 AM  
Tissue Type Percent Red 50 11/21/2018 10:04 AM  
Tissue Type Percent Yellow 50 11/21/2018 10:04 AM  
Drainage Amount  Small  11/21/2018 10:04 AM  
Drainage Color Serosanguinous 11/21/2018 10:04 AM  
Wound Odor None 11/21/2018 10:04 AM  
Periwound Skin Condition Erythema, blanchable 11/21/2018 10:04 AM  
Cleansing and Cleansing Agents  Normal saline 11/21/2018 10:04 AM  
Procedure Tolerated Well 11/14/2018 10:07 AM  
Number of days: 21 Wound Toe (specify in comments) Left;Dorsal (Active) DRESSING STATUS Clean, dry, and intact 11/21/2018 10:04 AM  
Non-Pressure Injury Full thickness (subcut/muscle) 11/21/2018 10:04 AM  
Wound Length (cm) 0.1 cm 11/21/2018 10:04 AM  
Wound Width (cm) 0.6 cm 11/21/2018 10:04 AM  
Wound Depth (cm) 0.1 11/21/2018 10:04 AM  
Wound Surface area (cm^2) 0.06 cm^2 11/21/2018 10:04 AM  
Change in Wound Size % 25 11/21/2018 10:04 AM  
Tissue Type Percent Red 100 11/21/2018 10:04 AM  
Drainage Amount  Scant 11/21/2018 10:04 AM  
Drainage Color Serosanguinous 11/21/2018 10:04 AM  
 Wound Odor None 11/21/2018 10:04 AM  
Cleansing and Cleansing Agents  Normal saline 11/21/2018 10:04 AM  
Procedure Tolerated Well 11/14/2018 10:07 AM  
Number of days: 7 Data Review:  
Recent Results (from the past 336 hour(s)) CULTURE, WOUND W GRAM STAIN Collection Time: 11/14/18 11:16 AM  
Result Value Ref Range Special Requests: NO SPECIAL REQUESTS    
 GRAM STAIN 0 TO 3 WBCS/OIF   
 GRAM STAIN FEW GRAM NEGATIVE RODS Culture result: HEAVY PSEUDOMONAS AERUGINOSA (A) Culture result: (A) ENTEROCOCCUS FAECALIS GROUP D ISOLATED FROM BROTH ONLY Culture result: MODERATE STENOTROPHOMONAS Shirlene Armin.) MALTOPHILIA (A) Susceptibility Pseudomonas aeruginosa - EMEKA Ciprofloxacin ($) 2 Intermediate ug/mL Amikacin ($) <=4 Susceptible ug/mL Tobramycin ($) <=0.5 Susceptible ug/mL Levofloxacin ($) 4 Intermediate ug/mL Aztreonam ($$$$) >16 Resistant ug/mL Cefepime ($$) 16 Resistant ug/mL Piperacillin/Tazobac ($) 32/4 Intermediate ug/mL Meropenem ($$) 1 Susceptible ug/mL Stenotrophomonas maltophilia  - EMEKA Trimeth-Sulfamethoxa <=0.5/9.5 Susceptible ug/mL Ceftazidime ($) 2 Susceptible ug/mL Levofloxacin ($) <=1 Susceptible ug/mL Enterococcus  faecalis group D - EMEKA Penicillin G ($$) 4 Susceptible ug/mL Vancomycin ($) 1 Susceptible ug/mL Ampicillin ($) 2 Susceptible ug/mL CULTURE, ANAEROBIC Collection Time: 11/14/18 11:16 AM  
Result Value Ref Range Special Requests: NO SPECIAL REQUESTS Culture result: NO ANAEROBES ISOLATED 7 DAYS    
  
 
PRICILLA studies in 2014: non compressible bilateral 
 
I independently reviewed recent labs, pathology reports, microbiology reports and radiology studies as noted above. Assessment:  
 
78 y.o. male with diabetic ulceration to the medial right hallux and dorsal left 1st MPJ Plan:  
 
Patient was examined and evaluated today.  They were educated on the barriers to healing. Culture reviewed and levaquin and ampicillin ordered. Take as directed. Continue acticoat bilateral.  
 
Return in 1 week for serial debridement. Any procedures done today in the wound center are documented in a seperate note in connect care made part of this record by reference. Patient instructed on the following: Follow all wound dressing instructions. Do not get dressing or wound wet. May shower if wound can be effectively kept dry. Cast covers may be purchased at [facility] and [facility]. Should you experience increased redness, swelling, pain, foul odor, size of wound(s), or have a temperature over 101 degrees please contact the [address] [place] [address] Road at [phone] or if after hours contact your primary care physician or go to the hospital emergency department. Orders Placed This Encounter  WOUND CARE, DRESSING CHANGE Left and right great toes Cleanse both wounds with normal saline. Apply Acticoat to wound bases and cover with dry gauze. Wrap with rolled gauze. Change dressing 3 times per week. Do not get wounds wet in shower. Obtain antibiotic at pharmacy and take until complete. Return to wound center in 1 week. Standing Status:   Standing Number of Occurrences:   1  
 levoFLOXacin (LEVAQUIN) 500 mg tablet Sig: Take 1 Tab by mouth daily. Dispense:  20 Tab Refill:  0  
 ampicillin (PRINCIPEN) 500 mg capsule Sig: Take 1 Cap by mouth Before breakfast, lunch, dinner and at bedtime. Dispense:  40 Cap Refill:  0 Follow-up Information Follow up With Specialties Details Why Contact Info Alina Mcconnell DPM Podiatry In 1 week For wound re-check [facility] [address] [address] 
[phone] Signed By: Alma Lyle DPM   
 November 21, 2018

## 2018-11-21 NOTE — WOUND CARE
[address] [place] [address] [address] Phone: [phone] Fax: [phone] Patient: Cassandra Felming MRN: [id number]  SSN: [id number] YOB: 1950  Age: 78 y.o. Sex: male Return Appointment: 1 week with Nicolette Abrams DPM 
 
Instructions: Left and right great toes Cleanse both wounds with normal saline. Apply Acticoat to wound bases and cover with dry gauze. Wrap with rolled gauze. Change dressing 3 times per week. Do not get wounds wet in shower. Obtain antibiotic at pharmacy and take until complete. Return to wound center in 1 week. Should you experience increased redness, swelling, pain, foul odor, size of wound(s), or have a temperature over 101 degrees please contact the [address] [place] [address] Road at [phone] or if after hours contact your primary care physician or go to the hospital emergency department. Signed By: Pari Dawn PT, [facility] November 21, 2018

## 2018-11-28 ENCOUNTER — HOSPITAL ENCOUNTER (OUTPATIENT)
Dept: WOUND CARE | Age: 68
Discharge: HOME | End: 2018-11-28
Attending: PODIATRIST
Payer: COMMERCIAL

## 2018-11-28 PROCEDURE — 99214 OFFICE O/P EST MOD 30 MIN: CPT

## 2018-11-28 NOTE — WOUND CARE
11/28/18 3226 Wound Toe (specify in comments) Left;Dorsal  
Date First Assessed/Time First Assessed: 11/14/18 1022   Location: Toe (specify in comments)  Wound Description (Optional): Great toe  Orientation: Left;Dorsal  
DRESSING STATUS Clean, dry, and intact DRESSING TYPE (acticoat flex, ABD, tape) Wound Length (cm) 0 cm Wound Width (cm) 0 cm Wound Depth (cm) 0 Wound Surface area (cm^2) 0 cm^2 Change in Wound Size % 100 Condition of Base Pink Epithelialization (%) 100 Tissue Type Percent Pink 100 Drainage Amount  None Cleansing and Cleansing Agents  Normal saline Wound Toe (specify in comments) Right;Medial  
Date First Assessed: 10/31/18   POA: Yes  Wound Type: Diabetic  Location: Toe (specify in comments)  Wound Description (Optional): Great Toe  Orientation: Right;Medial  
DRESSING STATUS Clean, dry, and intact DRESSING TYPE (acticoat flex, dry gauze, rolled gauze) Non-Pressure Injury Full thickness (subcut/muscle) Wound Length (cm) 2 cm Wound Width (cm) 1.5 cm Wound Depth (cm) 0.1 Wound Surface area (cm^2) 3 cm^2 Change in Wound Size % 25 Condition of Base Granulation Condition of Edges Open Tissue Type Red Tissue Type Percent Red 100 Drainage Amount  Small Drainage Color Serosanguinous Wound Odor None Periwound Skin Condition Intact Cleansing and Cleansing Agents  Normal saline

## 2018-11-28 NOTE — WOUND CARE
[address] [place] [address], [facility] [address] Phone: [phone] Fax: [phone] Patient: Joseluis Dean MRN: [id number]  SSN: [id number] YOB: 1950  Age: 78 y.o. Sex: male Return Appointment: 1 week with Zay Toney DPM 
 
Instructions:  
Right Great Toe: 
Cleanse wound with normal saline. Apply Acticoat to wound base and cover with dry gauze. Wrap with rolled gauze. Change dressing 3 times per week. Do not get wound wet in shower!! 
Continue antibiotic and take until complete. Return to wound center in 1 week. Should you experience increased redness, swelling, pain, foul odor, size of wound(s), or have a temperature over 101 degrees please contact the [address] [place] [address] Road at [phone] or if after hours contact your primary care physician or go to the hospital emergency department. Signed By: Francisco Steiner RN November 28, 2018

## 2018-11-28 NOTE — PROGRESS NOTES
Wound Center Progress Note Patient: Rony Mullins MRN: [id number]  SSN: [id number] YOB: 1950  Age: 78 y.o. Sex: male Subjective: Chief Complaint: 
Rony Mullins is a 78 y.o. WHITE [place]  male who presents with a wound to the right lower extremity at the medial hallux and overlying the EHL left hallux. He admits to less drainage and reduced erythema. He has been less active. He got the darco wedge and is wearing it at all times. History of Present Illness:    
Nature: Painless Location: medial right hallux, dorsal MPJ Duration: October 2018 Onset: Patient states it started as rubbing from boots Course Improving Aggravating/Alleviating: Worsened with pressure and dependency, improved with compression and rest 
Treatment: acticoat Past Medical History:  
Diagnosis Date  Asthma   
 controlled, no rescue inhaler; on Dulera 2 x d; singulair  CAD (coronary artery disease)   
 no MI; CABG May 2013  Candida infection  Diabetes ([address] type 2, insulin dependent since age 30; avg fasting 120; A1C last= 6.7 in March '14; hypo @ 70; today (7/31/14) = 304 fasting  Diabetes with ulcer of foot ([address] 9/30/2014  GERD (gastroesophageal reflux disease) daily prilosec  Hypercholesterolemia  Hypertension   
 controlled with med  NSTEMI, initial episode of care [facility]) 4/26/2013  Pleural effusion on right 10/10/2017  S/P CABG x 4 4/25/2013  Thyroid disease   
 hypo- on med Past Surgical History:  
Procedure Laterality Date  CHEST SURGERY PROCEDURE UNLISTED Right 10/12/2017 Right Thoractomy  HX COLONOSCOPY  01/16/2012  
 due date 01/16/2017  HX CORONARY ARTERY BYPASS GRAFT  4/25/13  
 x4 vessel, Dr. Mack Records  HX HEART CATHETERIZATION  4/24/2012  
 multivessel  HX OTHER SURGICAL Right   
 upper leg hematoma-\"cut it\" Family History Problem Relation Age of Onset  Cancer Father  Cancer Sister  Hypertension Mother  Diabetes Mother  Stroke Mother  Heart Disease Brother MI- had CABG  
 Diabetes Brother  Heart Disease Sister  Hypertension Sister Social History Tobacco Use  Smoking status: Never Smoker  Smokeless tobacco: Never Used Substance Use Topics  Alcohol use: Yes Alcohol/week: 3.5 oz Types: 7 Standard drinks or equivalent per week Prior to Admission medications Medication Sig Start Date End Date Taking? Authorizing Provider  
levoFLOXacin (LEVAQUIN) 500 mg tablet Take 1 Tab by mouth daily. 11/21/18   Henry Snider DPM  
ampicillin (PRINCIPEN) 500 mg capsule Take 1 Cap by mouth Before breakfast, lunch, dinner and at bedtime. 11/21/18   Henry Snider DPM  
montelukast (SINGULAIR) 10 mg tablet TAKE ONE TABLET BY MOUTH IN THE MORNING FOR  ASTHMA  PREVENTION. 11/19/18   Matthew Davey MD  
levothyroxine (SYNTHROID) 75 mcg tablet TAKE ONE TABLET BY MOUTH ONCE DAILY BEFORE  BREAKFAST 9/27/18   Matthew REY MD  
albuterol (PROVENTIL HFA, VENTOLIN HFA, PROAIR HFA) 90 mcg/actuation inhaler 2 puffs qid prn 5/16/18   Susana Ortiz NP  
insulin aspart U-100 (NOVOLOG FLEXPEN U-100 INSULIN) 100 unit/mL inpn INJECT UP TO 10 UNITS SUBCUTANEOUSLY 3 TIMES DAILY 4/2/18   Matthew Davey MD  
insulin glargine (LANTUS SOLOSTAR U-100 INSULIN) 100 unit/mL (3 mL) inpn INJECT 40 UNITS SUBCUTANEOUSLY EVERY EVENING 4/2/18   Matthew REY MD  
fluticasone-salmeterol (ADVAIR DISKUS) 250-50 mcg/dose diskus inhaler INHALE ONE DOSE BY MOUTH EVERY 12 HOURS 4/2/18   Matthew REY MD  
simvastatin (ZOCOR) 40 mg tablet Take 1 Tab by mouth nightly. 1/15/18   Matthew Davey MD  
metoprolol tartrate (LOPRESSOR) 25 mg tablet Take 1 Tab by mouth every twelve (12) hours. 1/15/18   Matthew Davey MD  
aspirin delayed-release 81 mg tablet Take 81 mg by mouth every morning. Indications: MYOCARDIAL INFARCTION PREVENTION    Provider, Historical  
PRILOSEC OTC PO Take  by mouth every morning. Indications: for GERD    Provider, Historical  
 
No Known Allergies Review of Systems: 
The patient has no difficulty with chest pain or shortness of breath. No fever or chills. No Nausea, vomiting or diarrhea. Comprehensive review of systems was otherwise unremarkable except as noted above. Lab Results Component Value Date/Time Hemoglobin A1c 7.4 (H) 10/07/2014 10:15 AM  
 Hemoglobin A1c (POC) 7.2 (A) 09/27/2018 12:57 PM  
  
 
Immunization History Administered Date(s) Administered  Influenza Vaccine 01/01/2018  Influenza Vaccine (Quad) PF 09/24/2018  Pneumococcal Polysaccharide (PPSV-23) 10/15/2017 There is no height or weight on file to calculate BMI. Counseling regarding nutrition done: Yes. Recommend Joaquín nutritional supplement for wound healing. Current medications: 
Current Outpatient Medications Medication Sig Dispense Refill  levoFLOXacin (LEVAQUIN) 500 mg tablet Take 1 Tab by mouth daily. 20 Tab 0  
 ampicillin (PRINCIPEN) 500 mg capsule Take 1 Cap by mouth Before breakfast, lunch, dinner and at bedtime. 40 Cap 0  
 montelukast (SINGULAIR) 10 mg tablet TAKE ONE TABLET BY MOUTH IN THE MORNING FOR  ASTHMA  PREVENTION.  90 Tab 3  
 levothyroxine (SYNTHROID) 75 mcg tablet TAKE ONE TABLET BY MOUTH ONCE DAILY BEFORE  BREAKFAST 90 Tab 3  
 albuterol (PROVENTIL HFA, VENTOLIN HFA, PROAIR HFA) 90 mcg/actuation inhaler 2 puffs qid prn 1 Inhaler 11  
 insulin aspart U-100 (NOVOLOG FLEXPEN U-100 INSULIN) 100 unit/mL inpn INJECT UP TO 10 UNITS SUBCUTANEOUSLY 3 TIMES DAILY 15 Pen 3  
 insulin glargine (LANTUS SOLOSTAR U-100 INSULIN) 100 unit/mL (3 mL) inpn INJECT 40 UNITS SUBCUTANEOUSLY EVERY EVENING 20 Pen 2  
 fluticasone-salmeterol (ADVAIR DISKUS) 250-50 mcg/dose diskus inhaler INHALE ONE DOSE BY MOUTH EVERY 12 HOURS 3 Inhaler 3  
  simvastatin (ZOCOR) 40 mg tablet Take 1 Tab by mouth nightly. 90 Tab 3  
 metoprolol tartrate (LOPRESSOR) 25 mg tablet Take 1 Tab by mouth every twelve (12) hours. 180 Tab 3  
 aspirin delayed-release 81 mg tablet Take 81 mg by mouth every morning. Indications: MYOCARDIAL INFARCTION PREVENTION    
 PRILOSEC OTC PO Take  by mouth every morning. Indications: for GERD Objective:  
 
Physical Exam:  
 
There were no vitals taken for this visit. General: well developed, well nourished, pleasant , NAD. Hygiene good Psych: cooperative. Pleasant. No anxiety or depression. Normal mood and affect. HEENT: Normocephalic, atraumatic. EOMI. Conjunctiva clear. No scleral icterus. Neck: Normal range of motion. No masses. Chest: Good air entry bilaterally. Respirations nonlabored Cardio: Normal heart sounds,no rubs, murmurs or gallops Abdomen: Soft, nontender, nondistended, normoactive bowel sounds Vascular: DP and PT pulses are palpable at 2/4 bilateral. Skin temperature is uniform from proximal to distal bilateral. Hair growth is absent bilateral.   
Derm: Nails 1-5 bilateral are within normal limits. No paronychial infections are noted. Skin is atrophic and xerotic. No subcutaneous masses or hyperpigmented lesions are present. Neuro: Epicritic sensation is presentt bilateral. Protective sensation is present with 5.07 SWMF testing to all 10 sites bilateral. Muscle tone and bulk is symmetric bilateral. 
Msk: Muscle strength is 5/5 for all prime movers of the foot bilateral. ROM of all joints is full and fluid without pain. No effusions are palpable. Ulceration to the medial right hallux with fibrogranular base. There is no bone exposed or palpable. Decreased serous drainage. No odor. No erythema or edema or heat is present. Ulceration to the dorsal left 1st MPJ resolved with epithelium. Diabetic Foot Ulcer/Neuropathic Is Wound Greater than 1.0 sq cm ? : Yes 
 Re-Current Wound with Skin Substitue within Last Year : No 
X-Ray in Last 3 Months: No 
PRICILLA In Last 6 Months : No 
Smoking Status: Non- Smoker Wound Free from Infection : No Culture Done Malignant Process in Wound : No Biopsy Done Hemoglobin A1Cin Last 3 Months: Yes Type of off Loading: Patient is not off loading Ulcer Description: Wound Chest Anterior;Mid (Active) Number of days: 2043 Wound Leg Left (Active) Number of days: 2043 Wound Foot Right (Active) Number of days: 0213 Wound Chest Right (Active) Number of days: 412 Wound Toe (specify in comments) Right;Medial (Active) DRESSING STATUS Clean, dry, and intact 11/28/2018  9:33 AM  
Non-Pressure Injury Full thickness (subcut/muscle) 11/28/2018  9:33 AM  
Wound Length (cm) 2 cm 11/28/2018  9:33 AM  
Wound Width (cm) 1.5 cm 11/28/2018  9:33 AM  
Wound Depth (cm) 0.1 11/28/2018  9:33 AM  
Wound Surface area (cm^2) 3 cm^2 11/28/2018  9:33 AM  
Change in Wound Size % 25 11/28/2018  9:33 AM  
Condition of Base Granulation 11/28/2018  9:33 AM  
Condition of Edges Open 11/28/2018  9:33 AM  
Tissue Type Red 11/28/2018  9:33 AM  
Tissue Type Percent Pink 30 11/7/2018  8:25 AM  
Tissue Type Percent Red 100 11/28/2018  9:33 AM  
Tissue Type Percent Yellow 50 11/21/2018 10:04 AM  
Drainage Amount  Small  11/28/2018  9:33 AM  
Drainage Color Serosanguinous 11/28/2018  9:33 AM  
Wound Odor None 11/28/2018  9:33 AM  
Periwound Skin Condition Intact 11/28/2018  9:33 AM  
Cleansing and Cleansing Agents  Normal saline 11/28/2018  9:33 AM  
Procedure Tolerated Well 11/21/2018 10:04 AM  
Number of days: 28  
   
[REMOVED] Wound Toe (specify in comments) Left;Dorsal (Removed) DRESSING STATUS Clean, dry, and intact 11/28/2018  9:33 AM  
Non-Pressure Injury Full thickness (subcut/muscle) 11/21/2018 10:04 AM  
Wound Length (cm) 0 cm 11/28/2018  9:33 AM  
Wound Width (cm) 0 cm 11/28/2018  9:33 AM  
Wound Depth (cm) 0 11/28/2018  9:33 AM  
 Wound Surface area (cm^2) 0 cm^2 11/28/2018  9:33 AM  
Change in Wound Size % 100 11/28/2018  9:33 AM  
Condition of Base Natalia 11/28/2018  9:33 AM  
Epithelialization (%) 100 11/28/2018  9:33 AM  
Tissue Type Percent Pink 100 11/28/2018  9:33 AM  
Tissue Type Percent Red 100 11/21/2018 10:04 AM  
Drainage Amount  None 11/28/2018  9:33 AM  
Drainage Color Serosanguinous 11/21/2018 10:04 AM  
Wound Odor None 11/21/2018 10:04 AM  
Cleansing and Cleansing Agents  Normal saline 11/28/2018  9:33 AM  
Procedure Tolerated Well 11/21/2018 10:04 AM  
Number of days: 14 Data Review: No results found for this or any previous visit (from the past 336 hour(s)). PRICILLA studies in 2014: non compressible bilateral 
 
I independently reviewed recent labs, pathology reports, microbiology reports and radiology studies as noted above. Assessment:  
 
78 y.o. male with diabetic ulceration to the medial right hallux and dorsal left 1st MPJ Plan:  
 
Patient was examined and evaluated today. They were educated on the barriers to healing. Continue antibiotics until completed. Left hallux wound resolved. Continue acticoat to the right. Continue darco wedge shoe. Return in 1 week for serial debridement. Any procedures done today in the wound center are documented in a seperate note in connect care made part of this record by reference. Patient instructed on the following: Follow all wound dressing instructions. Do not get dressing or wound wet. May shower if wound can be effectively kept dry. Cast covers may be purchased at [facility] and [facility]. Should you experience increased redness, swelling, pain, foul odor, size of wound(s), or have a temperature over 101 degrees please contact the [address] [place] [address] Road at [phone] or if after hours contact your primary care physician or go to the hospital emergency department. Orders Placed This Encounter  WOUND CARE, DRESSING CHANGE Right Great Toe: Cleanse wound with normal saline. Apply Acticoat to wound base and cover with dry gauze. Wrap with rolled gauze. Change dressing 3 times per week. Do not get wound wet in shower!! 
Continue antibiotic and take until complete. Return to wound center in 1 week. Standing Status:   Standing Number of Occurrences:   1 Follow-up Information Follow up With Specialties Details Why Contact Info [facility] Wound Care In 1 week  [phone] 
[phone] Signed By: Cassidy Botello DPM   
 November 28, 2018

## 2018-11-28 NOTE — DISCHARGE INSTRUCTIONS
Right Great Toe:  Cleanse wound with normal saline. Apply Acticoat to wound base and cover with dry gauze. Wrap with rolled gauze. Change dressing 3 times per week. Do not get wound wet in shower!!  Continue antibiotic and take until complete. Return to wound center in 1 week.

## 2018-12-05 ENCOUNTER — HOSPITAL ENCOUNTER (OUTPATIENT)
Dept: WOUND CARE | Age: 68
Discharge: HOME | End: 2018-12-05
Attending: PODIATRIST
Payer: COMMERCIAL

## 2018-12-05 VITALS
BODY MASS INDEX: 29.06 KG/M2 | TEMPERATURE: 97.8 F | HEIGHT: 70 IN | DIASTOLIC BLOOD PRESSURE: 64 MMHG | RESPIRATION RATE: 18 BRPM | HEART RATE: 70 BPM | WEIGHT: 203 LBS | SYSTOLIC BLOOD PRESSURE: 109 MMHG | OXYGEN SATURATION: 98 %

## 2018-12-05 DIAGNOSIS — L97.512 DIABETIC ULCER OF TOE OF RIGHT FOOT ASSOCIATED WITH TYPE 2 DIABETES MELLITUS, WITH FAT LAYER EXPOSED (HCC): Primary | ICD-10-CM

## 2018-12-05 DIAGNOSIS — E11.621 DIABETIC ULCER OF TOE OF RIGHT FOOT ASSOCIATED WITH TYPE 2 DIABETES MELLITUS, WITH FAT LAYER EXPOSED (HCC): Primary | ICD-10-CM

## 2018-12-05 PROCEDURE — 97597 DBRDMT OPN WND 1ST 20 CM/<: CPT

## 2018-12-05 NOTE — PROGRESS NOTES
Wound Center Progress Note Patient: Guerita Dale MRN: [id number]  SSN: [id number] YOB: 1950  Age: 78 y.o. Sex: male Subjective: Chief Complaint: 
Guerita Dale is a 78 y.o. WHITE [place]  male who presents with a wound to the right lower extremity at the medial hallux and overlying the EHL left hallux. He admits to less drainage and reduced erythema. He has been less active. History of Present Illness:    
Nature: Painless Location: medial right hallux, dorsal MPJ Duration: October 2018 Onset: Patient states it started as rubbing from boots Course Improving Aggravating/Alleviating: Worsened with pressure and dependency, improved with compression and rest 
Treatment: acticoat Past Medical History:  
Diagnosis Date  Asthma   
 controlled, no rescue inhaler; on Dulera 2 x d; singulair  CAD (coronary artery disease)   
 no MI; CABG May 2013  Candida infection  Diabetes ([address] type 2, insulin dependent since age 30; avg fasting 120; A1C last= 6.7 in March '14; hypo @ 70; today (7/31/14) = 304 fasting  Diabetes with ulcer of foot ([address] 9/30/2014  GERD (gastroesophageal reflux disease) daily prilosec  Hypercholesterolemia  Hypertension   
 controlled with med  NSTEMI, initial episode of care [facility]) 4/26/2013  Pleural effusion on right 10/10/2017  S/P CABG x 4 4/25/2013  Thyroid disease   
 hypo- on med Past Surgical History:  
Procedure Laterality Date  CHEST SURGERY PROCEDURE UNLISTED Right 10/12/2017 Right Thoractomy  HX COLONOSCOPY  01/16/2012  
 due date 01/16/2017  HX CORONARY ARTERY BYPASS GRAFT  4/25/13  
 x4 vessel, Dr. Karley Del Rio  HX HEART CATHETERIZATION  4/24/2012  
 multivessel  HX OTHER SURGICAL Right   
 upper leg hematoma-\"cut it\" Family History Problem Relation Age of Onset  Cancer Father  Cancer Sister  Hypertension Mother  Diabetes Mother  Stroke Mother  Heart Disease Brother MI- had CABG  
 Diabetes Brother  Heart Disease Sister  Hypertension Sister Social History Tobacco Use  Smoking status: Never Smoker  Smokeless tobacco: Never Used Substance Use Topics  Alcohol use: Yes Alcohol/week: 3.5 oz Types: 7 Standard drinks or equivalent per week Prior to Admission medications Medication Sig Start Date End Date Taking? Authorizing Provider  
levoFLOXacin (LEVAQUIN) 500 mg tablet Take 1 Tab by mouth daily. 11/21/18   Donnie Snider, MAGDALENE  
ampicillin (PRINCIPEN) 500 mg capsule Take 1 Cap by mouth Before breakfast, lunch, dinner and at bedtime. 11/21/18   Donnie Snider, MAGDALENE  
montelukast (SINGULAIR) 10 mg tablet TAKE ONE TABLET BY MOUTH IN THE MORNING FOR  ASTHMA  PREVENTION. 11/19/18   Trevor Edwards MD  
levothyroxine (SYNTHROID) 75 mcg tablet TAKE ONE TABLET BY MOUTH ONCE DAILY BEFORE  BREAKFAST 9/27/18   Trevor REY MD  
albuterol (PROVENTIL HFA, VENTOLIN HFA, PROAIR HFA) 90 mcg/actuation inhaler 2 puffs qid prn 5/16/18   Abbie Patterson NP  
insulin aspart U-100 (NOVOLOG FLEXPEN U-100 INSULIN) 100 unit/mL inpn INJECT UP TO 10 UNITS SUBCUTANEOUSLY 3 TIMES DAILY 4/2/18   Trevor Edwards MD  
insulin glargine (LANTUS SOLOSTAR U-100 INSULIN) 100 unit/mL (3 mL) inpn INJECT 40 UNITS SUBCUTANEOUSLY EVERY EVENING 4/2/18   Trevor REY MD  
fluticasone-salmeterol (ADVAIR DISKUS) 250-50 mcg/dose diskus inhaler INHALE ONE DOSE BY MOUTH EVERY 12 HOURS 4/2/18   Trevor REY MD  
simvastatin (ZOCOR) 40 mg tablet Take 1 Tab by mouth nightly. 1/15/18   Trevor Edwards MD  
metoprolol tartrate (LOPRESSOR) 25 mg tablet Take 1 Tab by mouth every twelve (12) hours. 1/15/18   Trevor Edwards MD  
aspirin delayed-release 81 mg tablet Take 81 mg by mouth every morning. Indications: MYOCARDIAL INFARCTION PREVENTION    Provider, Historical  
PRILOSEC OTC PO Take  by mouth every morning. Indications: for GERD    Provider, Historical  
 
No Known Allergies Review of Systems: 
The patient has no difficulty with chest pain or shortness of breath. No fever or chills. No Nausea, vomiting or diarrhea. Comprehensive review of systems was otherwise unremarkable except as noted above. Lab Results Component Value Date/Time Hemoglobin A1c 7.4 (H) 10/07/2014 10:15 AM  
 Hemoglobin A1c (POC) 7.2 (A) 09/27/2018 12:57 PM  
  
 
Immunization History Administered Date(s) Administered  Influenza Vaccine 01/01/2018  Influenza Vaccine (Quad) PF 09/24/2018  Pneumococcal Polysaccharide (PPSV-23) 10/15/2017 Body mass index is 29.13 kg/m². Counseling regarding nutrition done: Yes. Recommend Joaquín nutritional supplement for wound healing. Current medications: 
Current Outpatient Medications Medication Sig Dispense Refill  levoFLOXacin (LEVAQUIN) 500 mg tablet Take 1 Tab by mouth daily. 20 Tab 0  
 ampicillin (PRINCIPEN) 500 mg capsule Take 1 Cap by mouth Before breakfast, lunch, dinner and at bedtime. 40 Cap 0  
 montelukast (SINGULAIR) 10 mg tablet TAKE ONE TABLET BY MOUTH IN THE MORNING FOR  ASTHMA  PREVENTION.  90 Tab 3  
 levothyroxine (SYNTHROID) 75 mcg tablet TAKE ONE TABLET BY MOUTH ONCE DAILY BEFORE  BREAKFAST 90 Tab 3  
 albuterol (PROVENTIL HFA, VENTOLIN HFA, PROAIR HFA) 90 mcg/actuation inhaler 2 puffs qid prn 1 Inhaler 11  
 insulin aspart U-100 (NOVOLOG FLEXPEN U-100 INSULIN) 100 unit/mL inpn INJECT UP TO 10 UNITS SUBCUTANEOUSLY 3 TIMES DAILY 15 Pen 3  
 insulin glargine (LANTUS SOLOSTAR U-100 INSULIN) 100 unit/mL (3 mL) inpn INJECT 40 UNITS SUBCUTANEOUSLY EVERY EVENING 20 Pen 2  
 fluticasone-salmeterol (ADVAIR DISKUS) 250-50 mcg/dose diskus inhaler INHALE ONE DOSE BY MOUTH EVERY 12 HOURS 3 Inhaler 3  
  simvastatin (ZOCOR) 40 mg tablet Take 1 Tab by mouth nightly. 90 Tab 3  
 metoprolol tartrate (LOPRESSOR) 25 mg tablet Take 1 Tab by mouth every twelve (12) hours. 180 Tab 3  
 aspirin delayed-release 81 mg tablet Take 81 mg by mouth every morning. Indications: MYOCARDIAL INFARCTION PREVENTION    
 PRILOSEC OTC PO Take  by mouth every morning. Indications: for GERD Objective:  
 
Physical Exam:  
 
Visit Vitals /64 (BP 1 Location: Left arm, BP Patient Position: At rest) Pulse 70 Temp 97.8 °F (36.6 °C) Resp 18 Ht 5' 10\" (1.778 m) Wt 92.1 kg (203 lb) SpO2 98% BMI 29.13 kg/m² General: well developed, well nourished, pleasant , NAD. Hygiene good Psych: cooperative. Pleasant. No anxiety or depression. Normal mood and affect. HEENT: Normocephalic, atraumatic. EOMI. Conjunctiva clear. No scleral icterus. Neck: Normal range of motion. No masses. Chest: Good air entry bilaterally. Respirations nonlabored Cardio: Normal heart sounds,no rubs, murmurs or gallops Abdomen: Soft, nontender, nondistended, normoactive bowel sounds Vascular: DP and PT pulses are palpable at 2/4 bilateral. Skin temperature is uniform from proximal to distal bilateral. Hair growth is absent bilateral.   
Derm: Nails 1-5 bilateral are within normal limits. No paronychial infections are noted. Skin is atrophic and xerotic. No subcutaneous masses or hyperpigmented lesions are present. Neuro: Epicritic sensation is presentt bilateral. Protective sensation is present with 5.07 SWMF testing to all 10 sites bilateral. Muscle tone and bulk is symmetric bilateral. 
Msk: Muscle strength is 5/5 for all prime movers of the foot bilateral. ROM of all joints is full and fluid without pain. No effusions are palpable. Ulceration to the medial right hallux with fibrogranular base with increasing granulation. There is no bone exposed or palpable.  Decreased serous drainage. No odor. No erythema or edema or heat is present. Diabetic Foot Ulcer/Neuropathic Is Wound Greater than 1.0 sq cm ? : Yes Re-Current Wound with Skin Substitue within Last Year : No 
X-Ray in Last 3 Months: No 
PRICILLA In Last 6 Months : No 
Smoking Status: Non- Smoker Wound Free from Infection : No Culture Done Is Wound Free of [place] , and / or Bio-Deep Water: No 
Malignant Process in Wound : No Biopsy Done Hemoglobin A1Cin Last 3 Months: Yes Type of off Loading: Patient is not off loading Compression Therapy of 20mm or greater ?: No  
 
Ulcer Description: Wound Chest Anterior;Mid (Active) Number of days: 2050 Wound Leg Left (Active) Number of days: 2050 Wound Foot Right (Active) Number of days: 2813 Wound Chest Right (Active) Number of days: 419 Wound Toe (specify in comments) Right;Medial (Active) DRESSING STATUS Clean, dry, and intact 12/5/2018 11:19 AM  
Non-Pressure Injury Full thickness (subcut/muscle) 11/28/2018  9:33 AM  
Wound Length (cm) 2 cm 12/5/2018 11:19 AM  
Wound Width (cm) 1.7 cm 12/5/2018 11:19 AM  
Wound Depth (cm) 0.1 12/5/2018 11:19 AM  
Wound Surface area (cm^2) 3.4 cm^2 12/5/2018 11:19 AM  
Change in Wound Size % 15 12/5/2018 11:19 AM  
Condition of Base Granulation 12/5/2018 11:19 AM  
Condition of Edges Calloused 12/5/2018 11:19 AM  
Tissue Type Red 11/28/2018  9:33 AM  
Tissue Type Percent Pink 30 11/7/2018  8:25 AM  
Tissue Type Percent Red 100 12/5/2018 11:19 AM  
Tissue Type Percent Yellow 50 11/21/2018 10:04 AM  
Drainage Amount  Small  12/5/2018 11:19 AM  
Drainage Color Serosanguinous 12/5/2018 11:19 AM  
Wound Odor Mild 12/5/2018 11:19 AM  
Periwound Skin Condition Calloused 12/5/2018 11:19 AM  
Cleansing and Cleansing Agents  Normal saline 12/5/2018 11:19 AM  
Procedure Tolerated Well 11/21/2018 10:04 AM  
Number of days: 35  
   
[REMOVED] Wound Toe (specify in comments) Left;Dorsal (Removed) DRESSING STATUS Clean, dry, and intact 11/28/2018  9:33 AM  
Non-Pressure Injury Full thickness (subcut/muscle) 11/21/2018 10:04 AM  
Wound Length (cm) 0 cm 11/28/2018  9:33 AM  
Wound Width (cm) 0 cm 11/28/2018  9:33 AM  
Wound Depth (cm) 0 11/28/2018  9:33 AM  
Wound Surface area (cm^2) 0 cm^2 11/28/2018  9:33 AM  
Change in Wound Size % 100 11/28/2018  9:33 AM  
Condition of Base [place] 11/28/2018  9:33 AM  
Epithelialization (%) 100 11/28/2018  9:33 AM  
Tissue Type Percent Pink 100 11/28/2018  9:33 AM  
Tissue Type Percent Red 100 11/21/2018 10:04 AM  
Drainage Amount  None 11/28/2018  9:33 AM  
Drainage Color Serosanguinous 11/21/2018 10:04 AM  
Wound Odor None 11/21/2018 10:04 AM  
Cleansing and Cleansing Agents  Normal saline 11/28/2018  9:33 AM  
Procedure Tolerated Well 11/21/2018 10:04 AM  
Number of days: 14 Data Review: No results found for this or any previous visit (from the past 336 hour(s)). PRICILLA studies in 2014: non compressible bilateral 
 
I independently reviewed recent labs, pathology reports, microbiology reports and radiology studies as noted above. Assessment:  
 
78 y.o. male with diabetic ulceration to the medial right hallux and dorsal left 1st MPJ Plan:  
 
Patient was examined and evaluated today. They were educated on the barriers to healing. Continue acticoat to the right. Continue darco wedge shoe. Order placed for Epifix to be placed next week. Return in 1 week for serial debridement. Any procedures done today in the wound center are documented in a seperate note in connect care made part of this record by reference. Patient instructed on the following: Follow all wound dressing instructions. Do not get dressing or wound wet. May shower if wound can be effectively kept dry. Cast covers may be purchased at [facility].  
 
Should you experience increased redness, swelling, pain, foul odor, size of wound(s), or have a temperature over 101 degrees please contact the [address] [place] [address] Road at [phone] or if after hours contact your primary care physician or go to the hospital emergency department. Orders Placed This Encounter  REFERRAL TO DME Referral Priority:   Routine Referral Type:   Consultation Referral Reason:   Specialty Services Required Number of Visits Requested:   1  
 WOUND CARE, DRESSING CHANGE Right Great Toe: 
Cleanse wound with normal saline. Apply Acticoat to wound base and cover with dry gauze. Wrap with rolled gauze. Change dressing 3 times per week. Do not get wound wet in shower!! 
Continue antibiotic and take until complete. Return to wound center in 1 week. Plan for graft next week. Standing Status:   Standing Number of Occurrences:   1 Follow-up Information Follow up With Specialties Details Why Contact Info [facility] Wound Care In 1 week  [id number] 
[phone] Signed By: Alma Lyle DPM   
 December 5, 2018

## 2018-12-05 NOTE — PROCEDURES
Wound Center Debridement    Patient: Jenna Gomez MRN: [id number]  SSN: [id number]    YOB: 1950  Age: 78 y.o.   Sex: male      December 5, 2018     Procedure Performed By: Jazzy Hurtado DPM    Wound: 1 Right  Non Pressure  Toe To Fat Layer    Type of Debridement:  Selective      Time Out Taken: Yes    Pain Control: Insensate      Type of Tissue Removed: Biofilm and Callus    Frequency of Debridements: PRN    Consent in chart     Instrument: Curette      Bleeding: None     Hemostasis: n/a      Procedural Pain: Insensate    Post-Procedural Pain: Insensate    Pre-Debridement Measurements: 2 x 1.7 x 0.1 cm    Post-Debridement Measurements: 2 x 1.7 x 0.1 cm    Surface Area Debrided: 3.4 sq. cm    Response to Procedure: tolerated the procedure well with no complications

## 2018-12-05 NOTE — WOUND CARE
12/05/18 1119 Wound Toe (specify in comments) Right;Medial  
Date First Assessed: 10/31/18   POA: Yes  Wound Type: Diabetic  Location: Toe (specify in comments)  Wound Description (Optional): Great Toe  Orientation: Right;Medial  
DRESSING STATUS Clean, dry, and intact DRESSING TYPE (acticoat, 2X2, rolled gauze) Wound Length (cm) 2 cm Wound Width (cm) 1.7 cm Wound Depth (cm) 0.1 Wound Surface area (cm^2) 3.4 cm^2 Change in Wound Size % 15 Condition of Base Granulation Condition of Edges Calloused Tissue Type Percent Red 100 Drainage Amount  Small Drainage Color Serosanguinous Wound Odor Mild Periwound Skin Condition Calloused Cleansing and Cleansing Agents  Normal saline

## 2018-12-05 NOTE — WOUND CARE
[address] [place] [address], [address] Phone: [phone] Fax: [phone] Patient: Diane Arnold MRN: [id number]  SSN: [id number] YOB: 1950  Age: 78 y.o. Sex: male Return Appointment: 1 week with Sajan Hoyt DPM 
 
Instructions: Right Great Toe: 
Cleanse wound with normal saline. Apply Acticoat to wound base and cover with dry gauze. Wrap with rolled gauze. Change dressing 3 times per week. Do not get wound wet in shower!! 
Continue antibiotic and take until complete. Return to wound center in 1 week. Plan for graft next week. Should you experience increased redness, swelling, pain, foul odor, size of wound(s), or have a temperature over 101 degrees please contact the [address] [place] [address] Road at [phone] or if after hours contact your primary care physician or go to the hospital emergency department. Signed By: Vickie Jama RN December 5, 2018

## 2018-12-12 ENCOUNTER — HOSPITAL ENCOUNTER (OUTPATIENT)
Dept: WOUND CARE | Age: 68
Discharge: HOME | End: 2018-12-12
Attending: PODIATRIST
Payer: COMMERCIAL

## 2018-12-12 VITALS
SYSTOLIC BLOOD PRESSURE: 154 MMHG | WEIGHT: 206.2 LBS | BODY MASS INDEX: 29.52 KG/M2 | DIASTOLIC BLOOD PRESSURE: 70 MMHG | RESPIRATION RATE: 18 BRPM | TEMPERATURE: 98.5 F | OXYGEN SATURATION: 97 % | HEIGHT: 70 IN | HEART RATE: 64 BPM

## 2018-12-12 PROCEDURE — 97597 DBRDMT OPN WND 1ST 20 CM/<: CPT

## 2018-12-12 NOTE — PROGRESS NOTES
Wound Center Progress Note    Patient: Aniya Nobles MRN: [id number]  SSN: [id number]    YOB: 1950  Age: 78 y.o. Sex: male      Subjective:     Chief Complaint:  Aniya Nobles is a 78 y.o. WHITE OR  male who presents with a wound to the right lower extremity at the medial hallux. He admits to reduced erythema. He has been less active.       History of Present Illness:     Nature: Painless  Location: medial right hallux, dorsal MPJ  Duration: October 2018  Onset: Patient states it started as rubbing from boots  Course Improving  Aggravating/Alleviating: Worsened with pressure and dependency, improved with compression and rest  Treatment: acticoat    Past Medical History:   Diagnosis Date    Asthma     controlled, no rescue inhaler; on Dulera 2 x d; singulair    CAD (coronary artery disease)     no MI; CABG May 2013    Candida infection     Diabetes ([facility]     type 2, insulin dependent since age 30; avg fasting 120; A1C last= 6.7 in March '14; hypo @ 79; today (7/31/14) = 304 fasting    Diabetes with ulcer of foot ([facility] 9/30/2014    GERD (gastroesophageal reflux disease)     daily prilosec    Hypercholesterolemia     Hypertension     controlled with med    NSTEMI, initial episode of care ([facility] 4/26/2013    Pleural effusion on right 10/10/2017    S/P CABG x 4 4/25/2013    Thyroid disease     hypo- on med      Past Surgical History:   Procedure Laterality Date    CHEST SURGERY PROCEDURE UNLISTED Right 10/12/2017    Right Thoractomy    HX COLONOSCOPY  01/16/2012    due date 01/16/2017    HX CORONARY ARTERY BYPASS GRAFT  4/25/13    x4 vessel, Dr. Tita Alba  4/24/2012    multivessel    HX OTHER SURGICAL Right     upper leg hematoma-\"cut it\"     Family History   Problem Relation Age of Onset    Cancer Father     Cancer Sister     Hypertension Mother     Diabetes Mother     Stroke Mother     Heart Disease Brother         MI- had CABG  Diabetes Brother     Heart Disease Sister     Hypertension Sister       Social History     Tobacco Use    Smoking status: Never Smoker    Smokeless tobacco: Never Used   Substance Use Topics    Alcohol use: Yes     Alcohol/week: 3.5 oz     Types: 7 Standard drinks or equivalent per week       Prior to Admission medications    Medication Sig Start Date End Date Taking? Authorizing Provider   montelukast (SINGULAIR) 10 mg tablet TAKE ONE TABLET BY MOUTH IN THE MORNING FOR  ASTHMA  PREVENTION. 11/19/18   Noel Mena MD   levothyroxine (SYNTHROID) 75 mcg tablet TAKE ONE TABLET BY MOUTH ONCE DAILY BEFORE  BREAKFAST 9/27/18   Noel REY MD   albuterol (PROVENTIL HFA, VENTOLIN HFA, PROAIR HFA) 90 mcg/actuation inhaler 2 puffs qid prn 5/16/18   Corinne Idol, NP   insulin aspart U-100 (NOVOLOG FLEXPEN U-100 INSULIN) 100 unit/mL inpn INJECT UP TO 10 UNITS SUBCUTANEOUSLY 3 TIMES DAILY 4/2/18   Noel Mena MD   insulin glargine (LANTUS SOLOSTAR U-100 INSULIN) 100 unit/mL (3 mL) inpn INJECT 40 UNITS SUBCUTANEOUSLY EVERY EVENING 4/2/18   Noel REY MD   fluticasone-salmeterol (ADVAIR DISKUS) 250-50 mcg/dose diskus inhaler INHALE ONE DOSE BY MOUTH EVERY 12 HOURS 4/2/18   Noel REY MD   simvastatin (ZOCOR) 40 mg tablet Take 1 Tab by mouth nightly. 1/15/18   Noel Mena MD   metoprolol tartrate (LOPRESSOR) 25 mg tablet Take 1 Tab by mouth every twelve (12) hours. 1/15/18   Noel Mena MD   aspirin delayed-release 81 mg tablet Take 81 mg by mouth every morning. Indications: MYOCARDIAL INFARCTION PREVENTION    Provider, Historical   PRILOSEC OTC PO Take  by mouth every morning. Indications: for GERD    Provider, Historical     No Known Allergies     Review of Systems:  The patient has no difficulty with chest pain or shortness of breath. No fever or chills. No Nausea, vomiting or diarrhea.  Comprehensive review of systems was otherwise unremarkable except as noted above. Lab Results   Component Value Date/Time    Hemoglobin A1c 7.4 (H) 10/07/2014 10:15 AM    Hemoglobin A1c (POC) 7.2 (A) 09/27/2018 12:57 PM        Immunization History   Administered Date(s) Administered    Influenza Vaccine 01/01/2018    Influenza Vaccine (Quad) PF 09/24/2018    Pneumococcal Polysaccharide (PPSV-23) 10/15/2017       Body mass index is 29.59 kg/m². Counseling regarding nutrition done: Yes. Recommend Joaquín nutritional supplement for wound healing. Current medications:  Current Outpatient Medications   Medication Sig Dispense Refill    montelukast (SINGULAIR) 10 mg tablet TAKE ONE TABLET BY MOUTH IN THE MORNING FOR  ASTHMA  PREVENTION. 90 Tab 3    levothyroxine (SYNTHROID) 75 mcg tablet TAKE ONE TABLET BY MOUTH ONCE DAILY BEFORE  BREAKFAST 90 Tab 3    albuterol (PROVENTIL HFA, VENTOLIN HFA, PROAIR HFA) 90 mcg/actuation inhaler 2 puffs qid prn 1 Inhaler 11    insulin aspart U-100 (NOVOLOG FLEXPEN U-100 INSULIN) 100 unit/mL inpn INJECT UP TO 10 UNITS SUBCUTANEOUSLY 3 TIMES DAILY 15 Pen 3    insulin glargine (LANTUS SOLOSTAR U-100 INSULIN) 100 unit/mL (3 mL) inpn INJECT 40 UNITS SUBCUTANEOUSLY EVERY EVENING 20 Pen 2    fluticasone-salmeterol (ADVAIR DISKUS) 250-50 mcg/dose diskus inhaler INHALE ONE DOSE BY MOUTH EVERY 12 HOURS 3 Inhaler 3    simvastatin (ZOCOR) 40 mg tablet Take 1 Tab by mouth nightly. 90 Tab 3    metoprolol tartrate (LOPRESSOR) 25 mg tablet Take 1 Tab by mouth every twelve (12) hours. 180 Tab 3    aspirin delayed-release 81 mg tablet Take 81 mg by mouth every morning. Indications: MYOCARDIAL INFARCTION PREVENTION      PRILOSEC OTC PO Take  by mouth every morning.  Indications: for GERD           Objective:     Physical Exam:     Visit Vitals  /70 (BP 1 Location: Right arm)   Pulse 64   Temp 98.5 °F (36.9 °C)   Resp 18   Ht 5' 10\" (1.778 m)   Wt 93.5 kg (206 lb 3.2 oz)   SpO2 97%   BMI 29.59 kg/m² General: well developed, well nourished, pleasant , NAD. Hygiene good  Psych: cooperative. Pleasant. No anxiety or depression. Normal mood and affect. HEENT: Normocephalic, atraumatic. EOMI. Conjunctiva clear. No scleral icterus. Neck: Normal range of motion. No masses. Chest: Good air entry bilaterally. Respirations nonlabored  Cardio: Normal heart sounds,no rubs, murmurs or gallops  Abdomen: Soft, nontender, nondistended, normoactive bowel sounds    Vascular: DP and PT pulses are palpable at 2/4 bilateral. Skin temperature is uniform from proximal to distal bilateral. Hair growth is absent bilateral.    Derm: Nails 1-5 bilateral are within normal limits. No paronychial infections are noted. Skin is atrophic and xerotic. No subcutaneous masses or hyperpigmented lesions are present. Neuro: Epicritic sensation is presentt bilateral. Protective sensation is present with 5.07 SWMF testing to all 10 sites bilateral. Muscle tone and bulk is symmetric bilateral.  Msk: Muscle strength is 5/5 for all prime movers of the foot bilateral. ROM of all joints is full and fluid without pain. No effusions are palpable. Ulceration to the medial right hallux with fibrogranular base with increasing granulation. There is no bone exposed or palpable. Serous drainage. No odor. No erythema or edema or heat is present.             Ulcer Description:   Wound Chest Anterior;Mid (Active)   Number of days: 2057       Wound Leg Left (Active)   Number of days: 2057       Wound Foot Right (Active)   Number of days: 1524       Wound Chest Right (Active)   Number of days: 426       Wound Toe (specify in comments) Right;Medial (Active)   DRESSING STATUS Clean, dry, and intact 12/12/2018 10:29 AM   Non-Pressure Injury Full thickness (subcut/muscle) 12/12/2018 10:29 AM   Wound Length (cm) 1.9 cm 12/12/2018 10:29 AM   Wound Width (cm) 1.4 cm 12/12/2018 10:29 AM   Wound Depth (cm) 0.1 12/12/2018 10:29 AM   Wound Surface area (cm^2) 2.66 cm^2 12/12/2018 10:29 AM   Change in Wound Size % 33.5 12/12/2018 10:29 AM   Condition of Base Granulation;Slough 12/12/2018 10:29 AM   Condition of Edges Calloused 12/12/2018 10:29 AM   Tissue Type Red 11/28/2018  9:33 AM   Tissue Type Percent Pink 30 11/7/2018  8:25 AM   Tissue Type Percent Red 80 12/12/2018 10:29 AM   Tissue Type Percent Yellow 20 12/12/2018 10:29 AM   Drainage Amount  Moderate 12/12/2018 10:29 AM   Drainage Color Serosanguinous 12/12/2018 10:29 AM   Wound Odor None 12/12/2018 10:29 AM   Periwound Skin Condition Calloused; Macerated 12/12/2018 10:29 AM   Cleansing and Cleansing Agents  Normal saline 12/12/2018 10:29 AM   Procedure Tolerated Well 11/21/2018 10:04 AM   Number of days: 42       [REMOVED] Wound Toe (specify in comments) Left;Dorsal (Removed)   DRESSING STATUS Clean, dry, and intact 11/28/2018  9:33 AM   Non-Pressure Injury Full thickness (subcut/muscle) 11/21/2018 10:04 AM   Wound Length (cm) 0 cm 11/28/2018  9:33 AM   Wound Width (cm) 0 cm 11/28/2018  9:33 AM   Wound Depth (cm) 0 11/28/2018  9:33 AM   Wound Surface area (cm^2) 0 cm^2 11/28/2018  9:33 AM   Change in Wound Size % 100 11/28/2018  9:33 AM   Condition of Base [place] 11/28/2018  9:33 AM   Epithelialization (%) 100 11/28/2018  9:33 AM   Tissue Type Percent Pink 100 11/28/2018  9:33 AM   Tissue Type Percent Red 100 11/21/2018 10:04 AM   Drainage Amount  None 11/28/2018  9:33 AM   Drainage Color Serosanguinous 11/21/2018 10:04 AM   Wound Odor None 11/21/2018 10:04 AM   Cleansing and Cleansing Agents  Normal saline 11/28/2018  9:33 AM   Procedure Tolerated Well 11/21/2018 10:04 AM   Number of days: 14             Data Review:   No results found for this or any previous visit (from the past 336 hour(s)). PRICILLA studies in 2014: non compressible bilateral    I independently reviewed recent labs, pathology reports, microbiology reports and radiology studies as noted above.        Assessment:     78 y.o. male with diabetic ulceration to the medial right hallux and dorsal left 1st MPJ    Plan:     Patient was examined and evaluated today. They were educated on the barriers to healing. Continue acticoat to the wound. Continue darco wedge shoe. Epifix awaiting insurance authorization    Return in 1 week for serial debridement. Any procedures done today in the wound center are documented in a seperate note in connect care made part of this record by reference. Patient instructed on the following: Follow all wound dressing instructions. Do not get dressing or wound wet. May shower if wound can be effectively kept dry. Cast covers may be purchased at [facility] and [facility]. Should you experience increased redness, swelling, pain, foul odor, size of wound(s), or have a temperature over 101 degrees please contact the [address] [place] [address] Road at [phone] or if after hours contact your primary care physician or go to the hospital emergency department. Orders Placed This Encounter    WOUND CARE, DRESSING CHANGE     Instructions: Right Great Toe:  Cleanse wound with normal saline. Apply Acticoat to wound base and cover with dry gauze. Wrap with rolled gauze. Change dressing 3 times per week. Do not get wound wet in shower!!  Return to wound center in 1 week. Plan for Epifix application next week if approved by insurance.      Standing Status:   Standing     Number of Occurrences:   1       Follow-up Information     Follow up With Specialties Details Why Contact Info    Laura Linda DPM Podiatry In 1 week For wound re-check [address] [place] [address] Rd  [phone]               Signed By: Cassie Negrete DPM     December 12, 2018

## 2018-12-12 NOTE — PROGRESS NOTES
12/12/18 1029   Wound Toe (specify in comments) Right;Medial   Date First Assessed: 10/31/18   POA: Yes  Wound Type: Diabetic  Location: Toe (specify in comments)  Wound Description (Optional): Great Toe  Orientation: Right;Medial   DRESSING STATUS Clean, dry, and intact   DRESSING TYPE (Acticoat, gauze, rolled gauze)   Non-Pressure Injury Full thickness (subcut/muscle)   Wound Length (cm) 1.9 cm   Wound Width (cm) 1.4 cm   Wound Depth (cm) 0.1   Wound Surface area (cm^2) 2.66 cm^2   Change in Wound Size % 33.5   Condition of Base Granulation;Slough   Condition of Edges Calloused   Tissue Type Percent Red 80   Tissue Type Percent Yellow 20   Drainage Amount  Moderate   Drainage Color Serosanguinous   Wound Odor None   Periwound Skin Condition Calloused; Macerated   Cleansing and Cleansing Agents  Normal saline

## 2018-12-12 NOTE — PROCEDURES
Wound Center Debridement    Patient: Emeli Hampton MRN: [id number]  SSN: [id number]    YOB: 1950  Age: 78 y.o.   Sex: male      December 12, 2018     Procedure Performed By: Kevan Sherman DPM    Wound:  Right  Non Pressure Great Toe Breakdown of Skin     Type of Debridement:  Selective      Time Out Taken: Yes    Pain Control: Insensate      Type of Tissue Removed: Biofilm and Callus    Frequency of Debridements: PRN    Consent in chart     Instrument: Blade     Bleeding: Minimal     Hemostasis: Pressure     Procedural Pain: Insensate    Post-Procedural Pain: Insensate    Pre-Debridement Measurements: 1.9 x 1.4 x 0.1 cm    Post-Debridement Measurements: 2 x 1.3 x 0.1 cm    Surface Area Debrided: 2.6 sq. cm    Response to Procedure: tolerated the procedure well with no complications

## 2018-12-12 NOTE — WOUND CARE
[address]  [address], [address]  Phone: [phone]  Fax: [phone]    Patient: Carter Babb MRN: [id number]  SSN: [id number]    YOB: 1950  Age: 78 y.o. Sex: male       Return Appointment: 1 week with Christiana Gifford DPM    Instructions: Right Great Toe:  Cleanse wound with normal saline. Apply Acticoat to wound base and cover with dry gauze. Wrap with rolled gauze. Change dressing 3 times per week. Do not get wound wet in shower!!  Return to wound center in 1 week. Plan for Epifix application next week if approved by insurance. Should you experience increased redness, swelling, pain, foul odor, size of wound(s), or have a temperature over 101 degrees please contact the [address] [place] [address] Road at [phone] or if after hours contact your primary care physician or go to the hospital emergency department.     Signed By: Lamont Medeiros, PT, [address] [facility]    December 12, 2018

## 2018-12-19 ENCOUNTER — HOSPITAL ENCOUNTER (OUTPATIENT)
Dept: WOUND CARE | Age: 68
Discharge: HOME | End: 2018-12-19
Attending: PODIATRIST
Payer: COMMERCIAL

## 2018-12-19 VITALS
TEMPERATURE: 97.3 F | DIASTOLIC BLOOD PRESSURE: 78 MMHG | SYSTOLIC BLOOD PRESSURE: 139 MMHG | RESPIRATION RATE: 19 BRPM | HEART RATE: 65 BPM | OXYGEN SATURATION: 98 %

## 2018-12-19 DIAGNOSIS — E11.621 DIABETIC ULCER OF TOE OF RIGHT FOOT ASSOCIATED WITH TYPE 2 DIABETES MELLITUS, WITH FAT LAYER EXPOSED (HCC): Primary | ICD-10-CM

## 2018-12-19 DIAGNOSIS — L97.512 DIABETIC ULCER OF TOE OF RIGHT FOOT ASSOCIATED WITH TYPE 2 DIABETES MELLITUS, WITH FAT LAYER EXPOSED (HCC): Primary | ICD-10-CM

## 2018-12-19 PROCEDURE — 15271 SKIN SUB GRAFT TRNK/ARM/LEG: CPT

## 2018-12-19 PROCEDURE — 15275 SKIN SUB GRAFT FACE/NK/HF/G: CPT

## 2018-12-19 NOTE — WOUND CARE
12/19/18 0939   Wound Toe (specify in comments) Right;Medial   Date First Assessed: 10/31/18   POA: Yes  Wound Type: Diabetic  Location: Toe (specify in comments)  Wound Description (Optional): Great Toe  Orientation: Right;Medial   DRESSING STATUS Clean, dry, and intact   DRESSING TYPE (acticoat, abd, gauze)   Non-Pressure Injury Full thickness (subcut/muscle)   Wound Length (cm) 1.5 cm   Wound Width (cm) 1.2 cm   Wound Depth (cm) 0.1   Wound Surface area (cm^2) 1.8 cm^2   Change in Wound Size % 55   Condition of Base Granulation;Slough   Condition of Edges Calloused   Tissue Type Percent Red 90   Tissue Type Percent Yellow 10   Drainage Amount  Small    Drainage Color Serosanguinous   Wound Odor None   Periwound Skin Condition Calloused; Macerated   Cleansing and Cleansing Agents  Normal saline

## 2018-12-19 NOTE — WOUND CARE
[address]  [address], [address]  Phone: [phone]  Fax: [phone]    Patient: Guerita Dale MRN: [id number]  SSN: [id number]    YOB: 1950  Age: 80 y.o. Sex: male       Return Appointment: 2 weeks with Pratik Matias DPM    Instructions: Instructions: Right Great Toe:  Cleanse wound with normal saline. Apply Acticoat to wound base and cover with dry gauze. Wrap with rolled gauze. Change dressing 3 times per week. Do not get wound wet in shower!!  Return to wound center in 2 weeks for appointment with physician.     Plan for Epifix application when application arrives to [facility],[address]. Should you experience increased redness, swelling, pain, foul odor, size of wound(s), or have a temperature over 101 degrees please contact the [address] [place] [address] Road at [phone] or if after hours contact your primary care physician or go to the hospital emergency department.     Signed By: Stevan Saul RN     December 19, 2018

## 2018-12-19 NOTE — PROGRESS NOTES
Wound Center Progress Note    Patient: Bartolo Vidal MRN: [id number]  SSN: [id number]    YOB: 1950  Age: 80 y.o. Sex: male      Subjective:     Chief Complaint:  Bartolo Vidal is a 80 y.o. WHITE OR  male who presents with a wound to the right lower extremity at the medial hallux. He admits to reduced erythema. He has been more active and has golfed all week. He is not wearing his offloading shoe.      History of Present Illness:     Nature: Painless  Location: medial right hallux, dorsal MPJ  Duration: October 2018  Onset: Patient states it started as rubbing from boots  Course Improving  Aggravating/Alleviating: Worsened with pressure and dependency, improved with compression and rest  Treatment: acticoat    Past Medical History:   Diagnosis Date    Asthma     controlled, no rescue inhaler; on Dulera 2 x d; singulair    CAD (coronary artery disease)     no MI; CABG May 2013    Candida infection     Diabetes ([facility]     type 2, insulin dependent since age 30; avg fasting 120; A1C last= 6.7 in March '14; hypo @ 80; today (7/31/14) = 304 fasting    Diabetes with ulcer of foot ([address] 9/30/2014    GERD (gastroesophageal reflux disease)     daily prilosec    Hypercholesterolemia     Hypertension     controlled with med    NSTEMI, initial episode of care ([facility] [address] 4/26/2013    Pleural effusion on right 10/10/2017    S/P CABG x 4 4/25/2013    Thyroid disease     hypo- on med      Past Surgical History:   Procedure Laterality Date    CHEST SURGERY PROCEDURE UNLISTED Right 10/12/2017    Right Thoractomy    HX COLONOSCOPY  01/16/2012    due date 01/16/2017    HX CORONARY ARTERY BYPASS GRAFT  4/25/13    x4 vessel, Dr. Alistair Jackson  4/24/2012    multivessel    HX OTHER SURGICAL Right     upper leg hematoma-\"cut it\"     Family History   Problem Relation Age of Onset    Cancer Father     Cancer Sister     Hypertension Mother     Diabetes Mother    Steve Stroke Mother     Heart Disease Brother         MI- had CABG    Diabetes Brother     Heart Disease Sister     Hypertension Sister       Social History     Tobacco Use    Smoking status: Never Smoker    Smokeless tobacco: Never Used   Substance Use Topics    Alcohol use: Yes     Alcohol/week: 3.5 oz     Types: 7 Standard drinks or equivalent per week       Prior to Admission medications    Medication Sig Start Date End Date Taking? Authorizing Provider   montelukast (SINGULAIR) 10 mg tablet TAKE ONE TABLET BY MOUTH IN THE MORNING FOR  ASTHMA  PREVENTION. 11/19/18   Nemo Childers MD   levothyroxine (SYNTHROID) 75 mcg tablet TAKE ONE TABLET BY MOUTH ONCE DAILY BEFORE  BREAKFAST 9/27/18   Nemo REY MD   albuterol (PROVENTIL HFA, VENTOLIN HFA, PROAIR HFA) 90 mcg/actuation inhaler 2 puffs qid prn 5/16/18   [place] Caron, PATEL   insulin aspart U-100 (NOVOLOG FLEXPEN U-100 INSULIN) 100 unit/mL inpn INJECT UP TO 10 UNITS SUBCUTANEOUSLY 3 TIMES DAILY 4/2/18   Nemo Childers MD   insulin glargine (LANTUS SOLOSTAR U-100 INSULIN) 100 unit/mL (3 mL) inpn INJECT 40 UNITS SUBCUTANEOUSLY EVERY EVENING 4/2/18   Nemo REY MD   fluticasone-salmeterol (ADVAIR DISKUS) 250-50 mcg/dose diskus inhaler INHALE ONE DOSE BY MOUTH EVERY 12 HOURS 4/2/18   Nemo REY MD   simvastatin (ZOCOR) 40 mg tablet Take 1 Tab by mouth nightly. 1/15/18   Nemo Childers MD   metoprolol tartrate (LOPRESSOR) 25 mg tablet Take 1 Tab by mouth every twelve (12) hours. 1/15/18   Nemo Childers MD   aspirin delayed-release 81 mg tablet Take 81 mg by mouth every morning. Indications: MYOCARDIAL INFARCTION PREVENTION    Provider, Historical   PRILOSEC OTC PO Take  by mouth every morning. Indications: for GERD    Provider, Historical     No Known Allergies     Review of Systems:  The patient has no difficulty with chest pain or shortness of breath.  No fever or chills. No Nausea, vomiting or diarrhea. Comprehensive review of systems was otherwise unremarkable except as noted above. Lab Results   Component Value Date/Time    Hemoglobin A1c 7.4 (H) 10/07/2014 10:15 AM    Hemoglobin A1c (POC) 7.2 (A) 09/27/2018 12:57 PM        Immunization History   Administered Date(s) Administered    Influenza Vaccine 01/01/2018    Influenza Vaccine (Quad) PF 09/24/2018    Pneumococcal Polysaccharide (PPSV-23) 10/15/2017       There is no height or weight on file to calculate BMI. Counseling regarding nutrition done: Yes. Recommend Joaquín nutritional supplement for wound healing. Current medications:  Current Outpatient Medications   Medication Sig Dispense Refill    montelukast (SINGULAIR) 10 mg tablet TAKE ONE TABLET BY MOUTH IN THE MORNING FOR  ASTHMA  PREVENTION. 90 Tab 3    levothyroxine (SYNTHROID) 75 mcg tablet TAKE ONE TABLET BY MOUTH ONCE DAILY BEFORE  BREAKFAST 90 Tab 3    albuterol (PROVENTIL HFA, VENTOLIN HFA, PROAIR HFA) 90 mcg/actuation inhaler 2 puffs qid prn 1 Inhaler 11    insulin aspart U-100 (NOVOLOG FLEXPEN U-100 INSULIN) 100 unit/mL inpn INJECT UP TO 10 UNITS SUBCUTANEOUSLY 3 TIMES DAILY 15 Pen 3    insulin glargine (LANTUS SOLOSTAR U-100 INSULIN) 100 unit/mL (3 mL) inpn INJECT 40 UNITS SUBCUTANEOUSLY EVERY EVENING 20 Pen 2    fluticasone-salmeterol (ADVAIR DISKUS) 250-50 mcg/dose diskus inhaler INHALE ONE DOSE BY MOUTH EVERY 12 HOURS 3 Inhaler 3    simvastatin (ZOCOR) 40 mg tablet Take 1 Tab by mouth nightly. 90 Tab 3    metoprolol tartrate (LOPRESSOR) 25 mg tablet Take 1 Tab by mouth every twelve (12) hours. 180 Tab 3    aspirin delayed-release 81 mg tablet Take 81 mg by mouth every morning. Indications: MYOCARDIAL INFARCTION PREVENTION      PRILOSEC OTC PO Take  by mouth every morning.  Indications: for GERD           Objective:     Physical Exam:     Visit Vitals  /78 (BP 1 Location: Left arm)   Pulse 65   Temp 97.3 °F (36.3 °C)   Resp 19 SpO2 98%       General: well developed, well nourished, pleasant , NAD. Hygiene good  Psych: cooperative. Pleasant. No anxiety or depression. Normal mood and affect. HEENT: Normocephalic, atraumatic. EOMI. Conjunctiva clear. No scleral icterus. Neck: Normal range of motion. No masses. Chest: Good air entry bilaterally. Respirations nonlabored  Cardio: Normal heart sounds,no rubs, murmurs or gallops  Abdomen: Soft, nontender, nondistended, normoactive bowel sounds    Vascular: DP and PT pulses are palpable at 2/4 bilateral. Skin temperature is uniform from proximal to distal bilateral. Hair growth is absent bilateral.    Derm: Nails 1-5 bilateral are within normal limits. No paronychial infections are noted. Skin is atrophic and xerotic. No subcutaneous masses or hyperpigmented lesions are present. Neuro: Epicritic sensation is presentt bilateral. Protective sensation is present with 5.07 SWMF testing to all 10 sites bilateral. Muscle tone and bulk is symmetric bilateral.  Msk: Muscle strength is 5/5 for all prime movers of the foot bilateral. ROM of all joints is full and fluid without pain. No effusions are palpable. Ulceration to the medial right hallux with fibrogranular base with increasing granulation. There is no bone exposed or palpable. Serous drainage. No odor. No erythema or edema or heat is present.       Diabetic Foot Ulcer/Neuropathic   Is Wound Greater than 1.0 sq cm ? : Yes  Re-Current Wound with Skin Substitue within Last Year : No  X-Ray in Last 3 Months: No  PRICILLA In Last 6 Months : No  Smoking Status: Non- Smoker  Wound Free from Infection : Wound Culture Completed(11/14/18)  Is Wound Free of Eschar, Slough , and / or Bio-[place]arlimart: No  Malignant Process in Wound : No Biopsy Done  Hemoglobin A1Cin Last 3 Months: Yes  Type of off Loading: Patient is not off loading  Compression Therapy of 20mm or greater ?: No     Ulcer Description:   Wound Chest Anterior;Mid (Active)   Number of days: 2064 Wound Leg Left (Active)   Number of days: 2064       Wound Foot Right (Active)   Number of days: 1531       Wound Chest Right (Active)   Number of days: 433       Wound Toe (specify in comments) Right;Medial (Active)   DRESSING STATUS Clean, dry, and intact 12/19/2018  9:39 AM   Non-Pressure Injury Full thickness (subcut/muscle) 12/19/2018  9:39 AM   Wound Length (cm) 1.5 cm 12/19/2018  9:39 AM   Wound Width (cm) 1.2 cm 12/19/2018  9:39 AM   Wound Depth (cm) 0.1 12/19/2018  9:39 AM   Wound Surface area (cm^2) 1.8 cm^2 12/19/2018  9:39 AM   Change in Wound Size % 55 12/19/2018  9:39 AM   Condition of Base Granulation;Slough;Eschar 12/19/2018  9:39 AM   Condition of Edges Calloused 12/19/2018  9:39 AM   Tissue Type Red 11/28/2018  9:33 AM   Tissue Type Percent Black 5 % 12/19/2018  9:39 AM   Tissue Type Percent Pink 30 11/7/2018  8:25 AM   Tissue Type Percent Red 90 12/19/2018  9:39 AM   Tissue Type Percent Yellow 5 12/19/2018  9:39 AM   Drainage Amount  Small  12/19/2018  9:39 AM   Drainage Color Serosanguinous 12/19/2018  9:39 AM   Wound Odor None 12/19/2018  9:39 AM   Periwound Skin Condition Calloused; Macerated 12/19/2018  9:39 AM   Cleansing and Cleansing Agents  Normal saline 12/19/2018  9:39 AM   Procedure Tolerated Well 12/12/2018 10:29 AM   Number of days: 49       [REMOVED] Wound Toe (specify in comments) Left;Dorsal (Removed)   DRESSING STATUS Clean, dry, and intact 11/28/2018  9:33 AM   Non-Pressure Injury Full thickness (subcut/muscle) 11/21/2018 10:04 AM   Wound Length (cm) 0 cm 11/28/2018  9:33 AM   Wound Width (cm) 0 cm 11/28/2018  9:33 AM   Wound Depth (cm) 0 11/28/2018  9:33 AM   Wound Surface area (cm^2) 0 cm^2 11/28/2018  9:33 AM   Change in Wound Size % 100 11/28/2018  9:33 AM   Condition of Base [place] 11/28/2018  9:33 AM   Epithelialization (%) 100 11/28/2018  9:33 AM   Tissue Type Percent Pink 100 11/28/2018  9:33 AM   Tissue Type Percent Red 100 11/21/2018 10:04 AM   Drainage Amount  None 11/28/2018  9:33 AM   Drainage Color Serosanguinous 11/21/2018 10:04 AM   Wound Odor None 11/21/2018 10:04 AM   Cleansing and Cleansing Agents  Normal saline 11/28/2018  9:33 AM   Procedure Tolerated Well 11/21/2018 10:04 AM   Number of days: 14             Data Review:   No results found for this or any previous visit (from the past 336 hour(s)). PRICILLA studies in 2014: non compressible bilateral    I independently reviewed recent labs, pathology reports, microbiology reports and radiology studies as noted above. Assessment:     80 y.o. male with diabetic ulceration to the medial right hallux and dorsal left 1st MPJ    Plan:     Patient was examined and evaluated today. They were educated on the barriers to healing. Epifix was applied today after wound bed prep with curettage. Order for another next week with another physciain who will be present and then the following week with me. Return in 1 week for graft placement. Any procedures done today in the wound center are documented in a seperate note in connect care made part of this record by reference. Patient instructed on the following: Follow all wound dressing instructions. Do not get dressing or wound wet. May shower if wound can be effectively kept dry. Cast covers may be purchased at [facility] and [facility]. Should you experience increased redness, swelling, pain, foul odor, size of wound(s), or have a temperature over 101 degrees please contact the [address] [place] [address] Road at [phone] or if after hours contact your primary care physician or go to the hospital emergency department. Orders Placed This Encounter    REFERRAL TO DME     Referral Priority:   Routine     Referral Type:   Consultation     Referral Reason:   Specialty Services Required     Number of Visits Requested:   1    WOUND CARE, DRESSING CHANGE     Instructions: Right Great Toe:  Cleanse with normal saline. Epifix applied to wound. Lot # [id number]  Exp. 2023-08-01. Cover with Mepitel 1, ABD, rolled gauze. Secure with tape. Return in 1 week for an appointment with Dr. Maisha Maurer or Dr. Zeke Perez for reapplication of Epifix.      Standing Status:   Standing     Number of Occurrences:   1       Follow-up Information     Follow up With Specialties Details Why Contact Info    [facility] Wound Care In 2 weeks  [address] [place] [address] [id number] [phone]    [address] In 1 week with Dr. Maisha Maurer or Dr. Tanya [address] [place], [place] [id number]  [phone]             Signed By: Shankar Childress DPM     December 19, 2018

## 2018-12-19 NOTE — PROCEDURES
Wound Center Debridement    Patient: Diane Arnold MRN: [id number]  SSN: [id number]    YOB: 1950  Age: 80 y.o.   Sex: male      December 19, 2018     Procedure Performed By: Sajan Hoyt DPM    Wound: 1 Right  Non Pressure  Toe To Fat Layer    Type of Debridement:  Selective      Time Out Taken: Yes    Pain Control: Insensate      Type of Tissue Removed: Biofilm, Dermis, Necrotic/Eschar and Slough    Frequency of Debridements: PRN    Consent in chart     Instrument: Blade     Bleeding: Minimal     Hemostasis: Pressure     Procedural Pain: Insensate    Post-Procedural Pain: Insensate    Pre-Debridement Measurements:1.5 x 1.2 x 0.1 cm    Post-Debridement Measurements: 1.5 x 1.2 x 0.1 cm    Surface Area Debrided: 1.8 sq. cm    Response to Procedure: tolerated the procedure well with no complications

## 2018-12-19 NOTE — WOUND CARE
[address]  [address], [address]  Phone: [phone]  Fax: [phone]    Patient: Emeli Hampton MRN: [id number]  SSN: [id number]    YOB: 1950  Age: 80 y.o. Sex: male       Return Appointment: 1 week with Starr Zavala MD or Dr. Haris Alexander    Instructions:    Right Great Toe:  Cleanse with normal saline. Epifix applied to wound. Lot # [id number]  Exp. 2023-08-01. Cover with Mepitel 1, ABD, rolled gauze. Secure with tape. Return in 1 week for an appointment with Dr. Allison Higgins or Dr. Haris Alexander for reapplication of Epifix. **    Should you experience increased redness, swelling, pain, foul odor, size of wound(s), or have a temperature over 101 degrees please contact the [address] [place] [address] Road at [phone] or if after hours contact your primary care physician or go to the hospital emergency department.     Signed By: Leslie Hernandez RN     December 19, 2018

## 2018-12-19 NOTE — PROCEDURES
Wound Center Application of Skin Substitute    Patient: Alexander Lucio MRN: [id number]  SSN: [id number]    YOB: 1950  Age: 80 y.o. Sex: male      December 19, 2018     Time Out Taken: Yes    Procedure Performed By: Isabela Mueller DPM    Product Preparation - 's Guidelines Followed. Wound: 2 Right  Non Pressure  Toe     Application 1 out of 10    Product Applied: Epifix    Application Area: 1.8 sq cm    Amount Applied: 4 sq cm    Amount Wasted: None    Reconstituted with Normal Saline. Lot Number: [id number]    Expiration Date: 2023-08-01     Fenestrated: No    Affixed: Nothing    Dressing Applied: Mepitel 1, ABD, rolled gauze  Procedural Pain: Insensate    Post-Procedural Pain: Insensate    Response to Procedure: tolerated the procedure well with no complications    Patient instructed to keep wound area dry. Only change outer dressing if needed.      Signed By: Doll Kussmaul, RN     December 19, 2018

## 2018-12-27 ENCOUNTER — HOSPITAL ENCOUNTER (OUTPATIENT)
Dept: WOUND CARE | Age: 68
Discharge: HOME | End: 2018-12-27
Attending: PODIATRIST
Payer: COMMERCIAL

## 2018-12-27 VITALS
SYSTOLIC BLOOD PRESSURE: 116 MMHG | RESPIRATION RATE: 16 BRPM | HEART RATE: 67 BPM | BODY MASS INDEX: 29.49 KG/M2 | DIASTOLIC BLOOD PRESSURE: 68 MMHG | TEMPERATURE: 97.6 F | HEIGHT: 70 IN | WEIGHT: 206 LBS | OXYGEN SATURATION: 98 %

## 2018-12-27 DIAGNOSIS — L97.512 DIABETIC ULCER OF TOE OF RIGHT FOOT ASSOCIATED WITH TYPE 2 DIABETES MELLITUS, WITH FAT LAYER EXPOSED (HCC): Primary | ICD-10-CM

## 2018-12-27 DIAGNOSIS — G63 POLYNEUROPATHY ASSOCIATED WITH UNDERLYING DISEASE (HCC): Chronic | ICD-10-CM

## 2018-12-27 DIAGNOSIS — E11.621 DIABETIC ULCER OF TOE OF RIGHT FOOT ASSOCIATED WITH TYPE 2 DIABETES MELLITUS, WITH FAT LAYER EXPOSED (HCC): Primary | ICD-10-CM

## 2018-12-27 PROCEDURE — 99204 OFFICE O/P NEW MOD 45 MIN: CPT | Performed by: SURGERY

## 2018-12-27 PROCEDURE — 15275 SKIN SUB GRAFT FACE/NK/HF/G: CPT | Performed by: SURGERY

## 2018-12-27 PROCEDURE — 15275 SKIN SUB GRAFT FACE/NK/HF/G: CPT

## 2018-12-27 NOTE — PROGRESS NOTES
Wound Center Progress Note    Patient: Rory Robles MRN: [id number]  SSN: [id number]    YOB: 1950  Age: 80 y.o. Sex: male      Subjective:     Chief Complaint:  Rory Robles is a 80 y.o. WHITE OR  male who presents with a wound to the right lower extremity at the medial hallux. He admits to reduced erythema. He has been more active and has golfed all week. He is not wearing his offloading shoe. Patient had Epifix #1 grafted last week. He tolerated this well.     History of Present Illness:     Nature: Painless  Location: medial right hallux, dorsal MPJ  Duration: October 2018  Onset: Patient states it started as rubbing from boots  Course Improving  Aggravating/Alleviating: Worsened with pressure and dependency, improved with compression and rest  Treatment: acticoat    Past Medical History:   Diagnosis Date    Asthma     controlled, no rescue inhaler; on Dulera 2 x d; singulair    CAD (coronary artery disease)     no MI; CABG May 2013    Candida infection     Diabetes ([facility] [address]     type 2, insulin dependent since age 30; avg fasting 120; A1C last= 6.7 in March '14; hypo @ 80; today (7/31/14) = 304 fasting    Diabetes with ulcer of foot ([address] 9/30/2014    GERD (gastroesophageal reflux disease)     daily prilosec    Hypercholesterolemia     Hypertension     controlled with med    NSTEMI, initial episode of care ([facility] [address] 4/26/2013    Pleural effusion on right 10/10/2017    S/P CABG x 4 4/25/2013    Thyroid disease     hypo- on med      Past Surgical History:   Procedure Laterality Date    CHEST SURGERY PROCEDURE UNLISTED Right 10/12/2017    Right Thoractomy    HX COLONOSCOPY  01/16/2012    due date 01/16/2017    HX CORONARY ARTERY BYPASS GRAFT  4/25/13    x4 vessel, Dr. Polanco Lipoma  4/24/2012    multivessel    HX OTHER SURGICAL Right     upper leg hematoma-\"cut it\"     Family History   Problem Relation Age of Onset    Cancer Father  Cancer Sister     Hypertension Mother     Diabetes Mother     Stroke Mother     Heart Disease Brother         MI- had CABG    Diabetes Brother     Heart Disease Sister     Hypertension Sister       Social History     Tobacco Use    Smoking status: Never Smoker    Smokeless tobacco: Never Used   Substance Use Topics    Alcohol use: Yes     Alcohol/week: 3.5 oz     Types: 7 Standard drinks or equivalent per week       Prior to Admission medications    Medication Sig Start Date End Date Taking? Authorizing Provider   montelukast (SINGULAIR) 10 mg tablet TAKE ONE TABLET BY MOUTH IN THE MORNING FOR  ASTHMA  PREVENTION. 11/19/18   Adriana Landry MD   levothyroxine (SYNTHROID) 75 mcg tablet TAKE ONE TABLET BY MOUTH ONCE DAILY BEFORE  BREAKFAST 9/27/18   Adriana REY MD   albuterol (PROVENTIL HFA, VENTOLIN HFA, PROAIR HFA) 90 mcg/actuation inhaler 2 puffs qid prn 5/16/18   Reyna Aburto NP   insulin aspart U-100 (NOVOLOG FLEXPEN U-100 INSULIN) 100 unit/mL inpn INJECT UP TO 10 UNITS SUBCUTANEOUSLY 3 TIMES DAILY 4/2/18   Adriana Landry MD   insulin glargine (LANTUS SOLOSTAR U-100 INSULIN) 100 unit/mL (3 mL) inpn INJECT 40 UNITS SUBCUTANEOUSLY EVERY EVENING 4/2/18   Adriana REY MD   fluticasone-salmeterol (ADVAIR DISKUS) 250-50 mcg/dose diskus inhaler INHALE ONE DOSE BY MOUTH EVERY 12 HOURS 4/2/18   Adriana REY MD   simvastatin (ZOCOR) 40 mg tablet Take 1 Tab by mouth nightly. 1/15/18   Adriana Landry MD   metoprolol tartrate (LOPRESSOR) 25 mg tablet Take 1 Tab by mouth every twelve (12) hours. 1/15/18   Adriana Landry MD   aspirin delayed-release 81 mg tablet Take 81 mg by mouth every morning. Indications: MYOCARDIAL INFARCTION PREVENTION    Provider, Historical   PRILOSEC OTC PO Take  by mouth every morning.  Indications: for GERD    Provider, Historical     No Known Allergies     Review of Systems:  The patient has no difficulty with chest pain or shortness of breath. No fever or chills. No Nausea, vomiting or diarrhea. Comprehensive review of systems was otherwise unremarkable except as noted above. Lab Results   Component Value Date/Time    Hemoglobin A1c 7.4 (H) 10/07/2014 10:15 AM    Hemoglobin A1c (POC) 7.2 (A) 09/27/2018 12:57 PM        Immunization History   Administered Date(s) Administered    Influenza Vaccine 01/01/2018    Influenza Vaccine (Quad) PF 09/24/2018    Pneumococcal Polysaccharide (PPSV-23) 10/15/2017       Body mass index is 29.56 kg/m². Counseling regarding nutrition done: Yes. Recommend Joaquín nutritional supplement for wound healing. Current medications:  Current Outpatient Medications   Medication Sig Dispense Refill    montelukast (SINGULAIR) 10 mg tablet TAKE ONE TABLET BY MOUTH IN THE MORNING FOR  ASTHMA  PREVENTION. 90 Tab 3    levothyroxine (SYNTHROID) 75 mcg tablet TAKE ONE TABLET BY MOUTH ONCE DAILY BEFORE  BREAKFAST 90 Tab 3    albuterol (PROVENTIL HFA, VENTOLIN HFA, PROAIR HFA) 90 mcg/actuation inhaler 2 puffs qid prn 1 Inhaler 11    insulin aspart U-100 (NOVOLOG FLEXPEN U-100 INSULIN) 100 unit/mL inpn INJECT UP TO 10 UNITS SUBCUTANEOUSLY 3 TIMES DAILY 15 Pen 3    insulin glargine (LANTUS SOLOSTAR U-100 INSULIN) 100 unit/mL (3 mL) inpn INJECT 40 UNITS SUBCUTANEOUSLY EVERY EVENING 20 Pen 2    fluticasone-salmeterol (ADVAIR DISKUS) 250-50 mcg/dose diskus inhaler INHALE ONE DOSE BY MOUTH EVERY 12 HOURS 3 Inhaler 3    simvastatin (ZOCOR) 40 mg tablet Take 1 Tab by mouth nightly. 90 Tab 3    metoprolol tartrate (LOPRESSOR) 25 mg tablet Take 1 Tab by mouth every twelve (12) hours. 180 Tab 3    aspirin delayed-release 81 mg tablet Take 81 mg by mouth every morning. Indications: MYOCARDIAL INFARCTION PREVENTION      PRILOSEC OTC PO Take  by mouth every morning.  Indications: for GERD           Objective:     Physical Exam:     Visit Vitals  /68 (BP 1 Location: Right arm, BP Patient Position: Sitting)   Pulse 67   Temp 97.6 °F (36.4 °C)   Resp 16   Ht 5' 10\" (1.778 m)   Wt 206 lb (93.4 kg)   SpO2 98%   BMI 29.56 kg/m²       General: well developed, well nourished, pleasant , NAD. Hygiene good  Psych: cooperative. Pleasant. No anxiety or depression. Normal mood and affect. HEENT: Normocephalic, atraumatic. EOMI. Conjunctiva clear. No scleral icterus. Neck: Normal range of motion. No masses. Chest: Good air entry bilaterally. Respirations nonlabored  Cardio: Normal heart sounds,no rubs, murmurs or gallops  Abdomen: Soft, nontender, nondistended, normoactive bowel sounds    Vascular: DP and PT pulses are palpable at 2/4 bilateral. Skin temperature is uniform from proximal to distal bilateral. Hair growth is absent bilateral.    Derm: Nails 1-5 bilateral are within normal limits. No paronychial infections are noted. Skin is atrophic and xerotic. No subcutaneous masses or hyperpigmented lesions are present. Neuro: Epicritic sensation is presentt bilateral. Protective sensation is present with 5.07 SWMF testing to all 10 sites bilateral. Muscle tone and bulk is symmetric bilateral.  Msk: Muscle strength is 5/5 for all prime movers of the foot bilateral. ROM of all joints is full and fluid without pain. No effusions are palpable. Ulceration to the medial right hallux with fibrogranular base with increasing granulation. There is no bone exposed or palpable. Serous drainage. No odor. No erythema or edema or heat is present.       Diabetic Foot Ulcer/Neuropathic   Is Wound Greater than 1.0 sq cm ? : Yes  Re-Current Wound with Skin Substitue within Last Year : Yes(epifix)  X-Ray in Last 3 Months: No  PRICILLA In Last 6 Months : No  Smoking Status: Non- Smoker  Wound Free from Infection : Wound Culture Completed  Is Wound Free of Eschar, Slough , and / or Bio-[place]: Yes  Malignant Process in Wound : No Biopsy Done  Hemoglobin A1Cin Last 3 Months: Yes  Type of off Loading: Patient is not off loading  Compression Therapy of 20mm or greater ?: No     Ulcer Description:   Wound Chest Anterior;Mid (Active)   Number of days: 2072       Wound Leg Left (Active)   Number of days: 2072       Wound Foot Right (Active)   Number of days: 1539       Wound Chest Right (Active)   Number of days: 441       Wound Toe (specify in comments) Right;Medial (Active)   DRESSING STATUS Clean, dry, and intact 12/27/2018  2:49 PM   Non-Pressure Injury Full thickness (subcut/muscle) 12/27/2018  2:49 PM   Wound Length (cm) 1.9 cm 12/27/2018  2:49 PM   Wound Width (cm) 1 cm 12/27/2018  2:49 PM   Wound Depth (cm) 0.1 12/27/2018  2:49 PM   Wound Surface area (cm^2) 1.9 cm^2 12/27/2018  2:49 PM   Change in Wound Size % 52.5 12/27/2018  2:49 PM   Condition of Base Granulation;Slough 12/27/2018  2:49 PM   Condition of Edges Calloused 12/27/2018  2:49 PM   Tissue Type Red 11/28/2018  9:33 AM   Tissue Type Percent Black 5 % 12/19/2018  9:39 AM   Tissue Type Percent Pink 30 11/7/2018  8:25 AM   Tissue Type Percent Red 50 12/27/2018  2:49 PM   Tissue Type Percent Yellow 50 12/27/2018  2:49 PM   Drainage Amount  Small  12/27/2018  2:49 PM   Drainage Color Serous 12/27/2018  2:49 PM   Wound Odor Mild 12/27/2018  2:49 PM   Periwound Skin Condition Calloused 12/27/2018  2:49 PM   Cleansing and Cleansing Agents  Soap and water 12/27/2018  2:49 PM   Procedure Tolerated Well 12/12/2018 10:29 AM   Number of days: 57       [REMOVED] Wound Toe (specify in comments) Left;Dorsal (Removed)   DRESSING STATUS Clean, dry, and intact 11/28/2018  9:33 AM   Non-Pressure Injury Full thickness (subcut/muscle) 11/21/2018 10:04 AM   Wound Length (cm) 0 cm 11/28/2018  9:33 AM   Wound Width (cm) 0 cm 11/28/2018  9:33 AM   Wound Depth (cm) 0 11/28/2018  9:33 AM   Wound Surface area (cm^2) 0 cm^2 11/28/2018  9:33 AM   Change in Wound Size % 100 11/28/2018  9:33 AM   Condition of Base Zachary 11/28/2018  9:33 AM   Epithelialization (%) 100 11/28/2018  9:33 AM   Tissue Type Percent Pink 100 11/28/2018  9:33 AM   Tissue Type Percent Red 100 11/21/2018 10:04 AM   Drainage Amount  None 11/28/2018  9:33 AM   Drainage Color Serosanguinous 11/21/2018 10:04 AM   Wound Odor None 11/21/2018 10:04 AM   Cleansing and Cleansing Agents  Normal saline 11/28/2018  9:33 AM   Procedure Tolerated Well 11/21/2018 10:04 AM   Number of days: 14          Data Review:   No results found for this or any previous visit (from the past 336 hour(s)). PRICILLA studies in 2014: non compressible bilateral    I independently reviewed recent labs, pathology reports, microbiology reports and radiology studies as noted above. Assessment:     80 y.o. male with diabetic ulceration to the medial right hallux and dorsal left 1st MPJ    Plan:     Patient was examined and evaluated today. They were educated on the barriers to healing. Epifix was applied today after wound bed prep with scalpel. I will order another for next week for Dr. Devonte Mccarthy. Return in 1 week for graft placement. Any procedures done today in the wound center are documented in a seperate note in connect care made part of this record by reference. Patient instructed on the following: Follow all wound dressing instructions. Do not get dressing or wound wet. May shower if wound can be effectively kept dry. Cast covers may be purchased at [facility] [place]. Should you experience increased redness, swelling, pain, foul odor, size of wound(s), or have a temperature over 101 degrees please contact the [address] [place] [address] Road at [phone] or if after hours contact your primary care physician or go to the hospital emergency department. Orders Placed This Encounter    WOUND CARE, DRESSING CHANGE     Right great toe  Cleanse wound and periwound with wound cleanser or normal saline. Epifix 14mm Lot [id number] exp 4- applied by Dr Tania Williamson and secured with mepilex transfer, abd and rolled gauze. Do not get wet.   Change in one week at wound center   Epifix #3 to be ordered for next week     Standing Status:   Standing     Number of Occurrences:   1       Follow-up Information     Follow up With Specialties Details Why Contact Info    [address] In 1 week  [address] [place] [address] [address] [address] [place] [address]  [phone]             Signed By: Estella Aviles MD     December 27, 2018

## 2018-12-27 NOTE — WOUND CARE
12/27/18 1449   Wound Toe (specify in comments) Right;Medial   Date First Assessed: 10/31/18   POA: Yes  Wound Type: Diabetic  Location: Toe (specify in comments)  Wound Description (Optional): Great Toe  Orientation: Right;Medial   DRESSING STATUS Clean, dry, and intact   DRESSING TYPE (epifix, mepitel one, abd, rolled gauze)   Non-Pressure Injury Full thickness (subcut/muscle)   Wound Length (cm) 1.9 cm   Wound Width (cm) 1 cm   Wound Depth (cm) 0.1   Wound Surface area (cm^2) 1.9 cm^2   Change in Wound Size % 52.5   Condition of Base Granulation;Slough   Condition of Edges Calloused   Tissue Type Percent Red 50   Tissue Type Percent Yellow 50   Drainage Amount  Small    Drainage Color Serous   Wound Odor Mild   Periwound Skin Condition Calloused   Cleansing and Cleansing Agents  Soap and water

## 2018-12-27 NOTE — PROCEDURES
Wound Center Application of Skin Substitute    Patient: Sloan Carbajal MRN: [id number]  SSN: [id number]    YOB: 1950  Age: 80 y.o. Sex: male      December 27, 2018     Time Out Taken: Yes    Procedure Performed By: Imani Odom MD    Product Preparation - 's Guidelines Followed. Wound: 1 Right  Non Pressure  Toe To Fat Layer    Application 2 out of 10    Product Applied: Epifix    Application Area: 1.9 sq cm    Amount Applied: 2.0 sq cm    Amount Wasted: None    Reconstituted with Normal Saline. Lot Number: [id number]    Expiration Date: 4/01/2023     Fenestrated: No    Affixed: mepilex transfer    Dressing Applied: mepilex transfer, abd, rolled gauze    Procedural Pain: 0    Post-Procedural Pain: 0    Response to Procedure: tolerated the procedure well with no complications    Patient instructed to keep wound area dry. Only change outer dressing if needed.      Signed By: Jian Camp MD     December 27, 2018

## 2018-12-27 NOTE — WOUND CARE
[address]  [address], [address]  Phone: [phone]  Fax: [phone]    Patient: Karishma Cornejo MRN: [id number]  SSN: [id number]    YOB: 1950  Age: 80 y.o. Sex: male       Return Appointment: 1 week with Lenord Peabody, DPM    Instructions: Right great toe  Cleanse wound and periwound with wound cleanser or normal saline. Epifix 14mm Lot [id number] exp 4- applied by Dr Manuel Dumas and secured with mepilex transfer, abd and rolled gauze. Do not get wet. Change in one week at wound center   Epifix #3 to be ordered for next week      Should you experience increased redness, swelling, pain, foul odor, size of wound(s), or have a temperature over 101 degrees please contact the [address] [place] [address] Road at [phone] or if after hours contact your primary care physician or go to the hospital emergency department.     Signed By: Conrad Henning     December 27, 2018

## 2019-01-02 ENCOUNTER — HOSPITAL ENCOUNTER (OUTPATIENT)
Dept: WOUND CARE | Age: 69
Discharge: HOME | End: 2019-01-02
Attending: PODIATRIST
Payer: COMMERCIAL

## 2019-01-02 VITALS
HEIGHT: 70 IN | RESPIRATION RATE: 16 BRPM | SYSTOLIC BLOOD PRESSURE: 144 MMHG | HEART RATE: 63 BPM | BODY MASS INDEX: 29.58 KG/M2 | WEIGHT: 206.6 LBS | OXYGEN SATURATION: 98 % | TEMPERATURE: 98.1 F | DIASTOLIC BLOOD PRESSURE: 63 MMHG

## 2019-01-02 DIAGNOSIS — E11.621 DIABETIC ULCER OF TOE OF RIGHT FOOT ASSOCIATED WITH TYPE 2 DIABETES MELLITUS, WITH FAT LAYER EXPOSED (HCC): Primary | ICD-10-CM

## 2019-01-02 DIAGNOSIS — L97.512 DIABETIC ULCER OF TOE OF RIGHT FOOT ASSOCIATED WITH TYPE 2 DIABETES MELLITUS, WITH FAT LAYER EXPOSED (HCC): Primary | ICD-10-CM

## 2019-01-02 PROCEDURE — 15275 SKIN SUB GRAFT FACE/NK/HF/G: CPT

## 2019-01-02 NOTE — WOUND CARE
[address] [place] [address], [address] [address] Phone: [phone] Fax: [phone] Patient: Joe Mitchell MRN: [id number]  SSN: [id number] YOB: 1950  Age: 80 y.o. Sex: male Return Appointment: 1 week Thursday with Clinician Return Appointment: 2 weeks with Lord Staci DPM 
 
 
Instructions:  
Right great toe: 
Cleanse wound and periwound with wound cleanser or normal saline. Epifix 18mm Lot [id number] exp 5- applied by Dr Tee Juárez and secured with mepilex transfer, abd and rolled gauze. Do not get wet! Change in one week at wound center with Clinician, at this visit please apply Acticoat Flex to wound and cover with ABD and rolled gauze, change 3 times a week. Epifix #4 to be ordered for 2 week visit Should you experience increased redness, swelling, pain, foul odor, size of wound(s), or have a temperature over 101 degrees please contact the [address] [place] [address] Road at [phone] or if after hours contact your primary care physician or go to the hospital emergency department. Signed By: Jeffrey Mcfarland RN   
 January 2, 2019

## 2019-01-02 NOTE — DISCHARGE INSTRUCTIONS
Right great toe:  Cleanse wound and periwound with wound cleanser or normal saline. Epifix 18mm Lot [id number] exp 5- applied by Dr Jose R Gordon and secured with mepilex transfer, abd and rolled gauze. Do not get wet!   Change in one week at wound center   Epifix #4 to be ordered for 2 week visit

## 2019-01-02 NOTE — WOUND CARE
01/02/19 1045 Wound Toe (specify in comments) Right;Medial  
Date First Assessed: 10/31/18   POA: Yes  Wound Type: Diabetic  Location: Toe (specify in comments)  Wound Description (Optional): Great Toe  Orientation: Right;Medial  
DRESSING STATUS Clean, dry, and intact DRESSING TYPE (Epifix, mepilex transfer, ABD, gauze) Non-Pressure Injury Full thickness (subcut/muscle) Wound Length (cm) 1.9 cm Wound Width (cm) 1 cm Wound Depth (cm) 0.1 Wound Surface area (cm^2) 1.9 cm^2 Change in Wound Size % 52.5 Condition of Base Granulation;Epithelializing;Slough Condition of Edges Calloused Tissue Type Percent Black 5 % Tissue Type Percent Red 75 Tissue Type Percent Yellow 25 Drainage Amount  Small Drainage Color Serous Wound Odor Mild Periwound Skin Condition Calloused Cleansing and Cleansing Agents  Normal saline

## 2019-01-02 NOTE — PROGRESS NOTES
Wound Center Progress Note Patient: Cassandra Villarreal MRN: [id number]  SSN: [id number] YOB: 1950  Age: 80 y.o. Sex: male Subjective: Chief Complaint: 
Cassandra Villarreal is a 80 y.o. WHITE OR  male who presents with a wound to the right lower extremity at the medial hallux. He admits to reduced erythema. He had epifix applied last week and has left it dry, clean and intact. He admits to golfing all week. History of Present Illness:    
Nature: Painless Location: medial right hallux, dorsal MPJ Duration: October 2018 Onset: Patient states it started as rubbing from boots Course Improving Aggravating/Alleviating: Worsened with pressure and dependency, improved with compression and rest 
Treatment: epifix Past Medical History:  
Diagnosis Date  Asthma   
 controlled, no rescue inhaler; on Dulera 2 x d; singulair  CAD (coronary artery disease)   
 no MI; CABG May 2013  Candida infection  Diabetes ([address] type 2, insulin dependent since age 30; avg fasting 120; A1C last= 6.7 in March '14; hypo @ 70; today (7/31/14) = 304 fasting  Diabetes with ulcer of foot ([address] 9/30/2014  GERD (gastroesophageal reflux disease) daily prilosec  Hypercholesterolemia  Hypertension   
 controlled with med  NSTEMI, initial episode of care [facility]) 4/26/2013  Pleural effusion on right 10/10/2017  S/P CABG x 4 4/25/2013  Thyroid disease   
 hypo- on med Past Surgical History:  
Procedure Laterality Date  CHEST SURGERY PROCEDURE UNLISTED Right 10/12/2017 Right Thoractomy  HX COLONOSCOPY  01/16/2012  
 due date 01/16/2017  HX CORONARY ARTERY BYPASS GRAFT  4/25/13  
 x4 vessel, Dr. Bimal Howard  HX HEART CATHETERIZATION  4/24/2012  
 multivessel  HX OTHER SURGICAL Right   
 upper leg hematoma-\"cut it\" Family History Problem Relation Age of Onset  Cancer Father  Cancer Sister  Hypertension Mother  Diabetes Mother  Stroke Mother  Heart Disease Brother MI- had CABG  
 Diabetes Brother  Heart Disease Sister  Hypertension Sister Social History Tobacco Use  Smoking status: Never Smoker  Smokeless tobacco: Never Used Substance Use Topics  Alcohol use: Yes Alcohol/week: 3.5 oz Types: 7 Standard drinks or equivalent per week Prior to Admission medications Medication Sig Start Date End Date Taking? Authorizing Provider  
montelukast (SINGULAIR) 10 mg tablet TAKE ONE TABLET BY MOUTH IN THE MORNING FOR  ASTHMA  PREVENTION. 11/19/18   Lloyd Moreno MD  
levothyroxine (SYNTHROID) 75 mcg tablet TAKE ONE TABLET BY MOUTH ONCE DAILY BEFORE  BREAKFAST 9/27/18   Lloyd REY MD  
albuterol (PROVENTIL HFA, VENTOLIN HFA, PROAIR HFA) 90 mcg/actuation inhaler 2 puffs qid prn 5/16/18   Lord Closs, NP  
insulin aspart U-100 (NOVOLOG FLEXPEN U-100 INSULIN) 100 unit/mL inpn INJECT UP TO 10 UNITS SUBCUTANEOUSLY 3 TIMES DAILY 4/2/18   Lloyd Moreno MD  
insulin glargine (LANTUS SOLOSTAR U-100 INSULIN) 100 unit/mL (3 mL) inpn INJECT 40 UNITS SUBCUTANEOUSLY EVERY EVENING 4/2/18   Lloyd REY MD  
fluticasone-salmeterol (ADVAIR DISKUS) 250-50 mcg/dose diskus inhaler INHALE ONE DOSE BY MOUTH EVERY 12 HOURS 4/2/18   Lloyd REY MD  
simvastatin (ZOCOR) 40 mg tablet Take 1 Tab by mouth nightly. 1/15/18   Lloyd Moreno MD  
metoprolol tartrate (LOPRESSOR) 25 mg tablet Take 1 Tab by mouth every twelve (12) hours. 1/15/18   Lloyd Moreno MD  
aspirin delayed-release 81 mg tablet Take 81 mg by mouth every morning. Indications: MYOCARDIAL INFARCTION PREVENTION    Provider, Historical  
PRILOSEC OTC PO Take  by mouth every morning. Indications: for GERD    Provider, Historical  
 
No Known Allergies Review of Systems: The patient has no difficulty with chest pain or shortness of breath. No fever or chills. No Nausea, vomiting or diarrhea. Comprehensive review of systems was otherwise unremarkable except as noted above. Lab Results Component Value Date/Time Hemoglobin A1c 7.4 (H) 10/07/2014 10:15 AM  
 Hemoglobin A1c (POC) 7.2 (A) 09/27/2018 12:57 PM  
  
 
Immunization History Administered Date(s) Administered  Influenza Vaccine 01/01/2018  Influenza Vaccine (Quad) PF 09/24/2018  Pneumococcal Polysaccharide (PPSV-23) 10/15/2017 Body mass index is 29.64 kg/m². Counseling regarding nutrition done: Yes. Recommend Joaquín nutritional supplement for wound healing. Current medications: 
Current Outpatient Medications Medication Sig Dispense Refill  montelukast (SINGULAIR) 10 mg tablet TAKE ONE TABLET BY MOUTH IN THE MORNING FOR  ASTHMA  PREVENTION. 90 Tab 3  
 levothyroxine (SYNTHROID) 75 mcg tablet TAKE ONE TABLET BY MOUTH ONCE DAILY BEFORE  BREAKFAST 90 Tab 3  
 albuterol (PROVENTIL HFA, VENTOLIN HFA, PROAIR HFA) 90 mcg/actuation inhaler 2 puffs qid prn 1 Inhaler 11  
 insulin aspart U-100 (NOVOLOG FLEXPEN U-100 INSULIN) 100 unit/mL inpn INJECT UP TO 10 UNITS SUBCUTANEOUSLY 3 TIMES DAILY 15 Pen 3  
 insulin glargine (LANTUS SOLOSTAR U-100 INSULIN) 100 unit/mL (3 mL) inpn INJECT 40 UNITS SUBCUTANEOUSLY EVERY EVENING 20 Pen 2  
 fluticasone-salmeterol (ADVAIR DISKUS) 250-50 mcg/dose diskus inhaler INHALE ONE DOSE BY MOUTH EVERY 12 HOURS 3 Inhaler 3  
 simvastatin (ZOCOR) 40 mg tablet Take 1 Tab by mouth nightly. 90 Tab 3  
 metoprolol tartrate (LOPRESSOR) 25 mg tablet Take 1 Tab by mouth every twelve (12) hours. 180 Tab 3  
 aspirin delayed-release 81 mg tablet Take 81 mg by mouth every morning. Indications: MYOCARDIAL INFARCTION PREVENTION    
 PRILOSEC OTC PO Take  by mouth every morning. Indications: for GERD Objective:  
 
Physical Exam:  
 
Visit Vitals /63 (BP 1 Location: Right arm) Pulse 63 Temp 98.1 °F (36.7 °C) Resp 16 Ht 5' 10\" (1.778 m) Wt 93.7 kg (206 lb 9.6 oz) SpO2 98% BMI 29.64 kg/m² General: well developed, well nourished, pleasant , NAD. Hygiene good Psych: cooperative. Pleasant. No anxiety or depression. Normal mood and affect. HEENT: Normocephalic, atraumatic. EOMI. Conjunctiva clear. No scleral icterus. Neck: Normal range of motion. No masses. Chest: Good air entry bilaterally. Respirations nonlabored Cardio: Normal heart sounds,no rubs, murmurs or gallops Abdomen: Soft, nontender, nondistended, normoactive bowel sounds Vascular: DP and PT pulses are palpable at 2/4 bilateral. Skin temperature is uniform from proximal to distal bilateral. Hair growth is absent bilateral.   
Derm: Nails 1-5 bilateral are within normal limits. No paronychial infections are noted. Skin is atrophic and xerotic. No subcutaneous masses or hyperpigmented lesions are present. Neuro: Epicritic sensation is presentt bilateral. Protective sensation is present with 5.07 SWMF testing to all 10 sites bilateral. Muscle tone and bulk is symmetric bilateral. 
Msk: Muscle strength is 5/5 for all prime movers of the foot bilateral. ROM of all joints is full and fluid without pain. No effusions are palpable. Ulceration to the medial right hallux with fibrogranular base with increasing granulation. There is no bone exposed or palpable. No drainage. No odor. No erythema or edema or heat is present. Change in shape with increased granulation. Diabetic Foot Ulcer/Neuropathic Is Wound Greater than 1.0 sq cm ? : Yes Re-Current Wound with Skin Substitue within Last Year : Yes X-Ray in Last 3 Months: No 
PRICILLA In Last 6 Months : No 
Smoking Status: Non- Smoker Wound Free from Infection : Wound Culture Completed Is Wound Free of [place] , and / or Bio-[place]: Yes Malignant Process in Wound : No Biopsy Done Hemoglobin A1Cin Last 3 Months: Yes Type of off Loading: Patient is not off loading Compression Therapy of 20mm or greater ?: No  
 
Ulcer Description: Wound Chest Anterior;Mid (Active) Number of days: 2078 Wound Leg Left (Active) Number of days: 2078 Wound Foot Right (Active) Number of days: 1448 Wound Chest Right (Active) Number of days: 447 Wound Toe (specify in comments) Right;Medial (Active) DRESSING STATUS Clean, dry, and intact 1/2/2019 10:45 AM  
Non-Pressure Injury Full thickness (subcut/muscle) 1/2/2019 10:45 AM  
Wound Length (cm) 1.9 cm 1/2/2019 10:45 AM  
Wound Width (cm) 1 cm 1/2/2019 10:45 AM  
Wound Depth (cm) 0.1 1/2/2019 10:45 AM  
Wound Surface area (cm^2) 1.9 cm^2 1/2/2019 10:45 AM  
Change in Wound Size % 52.5 1/2/2019 10:45 AM  
Condition of Base Granulation;Epithelializing;Slough 1/2/2019 10:45 AM  
Condition of Edges Calloused 1/2/2019 10:45 AM  
Tissue Type Red 11/28/2018  9:33 AM  
Tissue Type Percent Black 5 % 1/2/2019 10:45 AM  
Tissue Type Percent Pink 30 11/7/2018  8:25 AM  
Tissue Type Percent Red 75 1/2/2019 10:45 AM  
Tissue Type Percent Yellow 25 1/2/2019 10:45 AM  
Drainage Amount  Small  1/2/2019 10:45 AM  
Drainage Color Serous 1/2/2019 10:45 AM  
Wound Odor Mild 1/2/2019 10:45 AM  
Periwound Skin Condition Calloused 1/2/2019 10:45 AM  
Cleansing and Cleansing Agents  Normal saline 1/2/2019 10:45 AM  
Procedure Tolerated Well 12/12/2018 10:29 AM  
Number of days: 63  
   
[REMOVED] Wound Toe (specify in comments) Left;Dorsal (Removed) DRESSING STATUS Clean, dry, and intact 11/28/2018  9:33 AM  
Non-Pressure Injury Full thickness (subcut/muscle) 11/21/2018 10:04 AM  
Wound Length (cm) 0 cm 11/28/2018  9:33 AM  
Wound Width (cm) 0 cm 11/28/2018  9:33 AM  
Wound Depth (cm) 0 11/28/2018  9:33 AM  
Wound Surface area (cm^2) 0 cm^2 11/28/2018  9:33 AM  
Change in Wound Size % 100 11/28/2018  9:33 AM  
Condition of Base [place] 11/28/2018  9:33 AM  
 Epithelialization (%) 100 11/28/2018  9:33 AM  
Tissue Type Percent Pink 100 11/28/2018  9:33 AM  
Tissue Type Percent Red 100 11/21/2018 10:04 AM  
Drainage Amount  None 11/28/2018  9:33 AM  
Drainage Color Serosanguinous 11/21/2018 10:04 AM  
Wound Odor None 11/21/2018 10:04 AM  
Cleansing and Cleansing Agents  Normal saline 11/28/2018  9:33 AM  
Procedure Tolerated Well 11/21/2018 10:04 AM  
Number of days: 14 Data Review: No results found for this or any previous visit (from the past 336 hour(s)). PRICILLA studies in 2014: non compressible bilateral 
 
I independently reviewed recent labs, pathology reports, microbiology reports and radiology studies as noted above. Assessment:  
 
80 y.o. male with diabetic ulceration to the medial right hallux and dorsal left 1st MPJ - improving Plan:  
 
Patient was examined and evaluated today. They were educated on the barriers to healing. Epifix was applied today after wound bed prep. After application he informs me that he is leaving Friday to go to [place] to the football championship game. I explained to him the extreme risk he is taking with the travel, foreign environment and the amount of activity and standing he will be doing while gone. The week long dressing needs to be clean and dry at all times. He understood the risk and stated that he would be going. He will be seen on Thursday next week for a dressing change and will have acticoat applied until he sees me the following week. Any procedures done today in the wound center are documented in a seperate note in [facility] care made part of this record by reference. Patient instructed on the following: Follow all wound dressing instructions. Do not get dressing or wound wet. May shower if wound can be effectively kept dry. Cast covers may be purchased at [facility] and \[facility]\"".  
 
Should you experience increased redness, swelling, pain, foul odor, size of wound(s), or have a temperature over 101 degrees please contact the [address] [place] [address] Road at [phone] or if after hours contact your primary care physician or go to the hospital emergency department. Orders Placed This Encounter  REFERRAL TO DME Referral Priority:   Routine Referral Type:   Consultation Referral Reason:   Specialty Services Required Number of Visits Requested:   1  
 WOUND CARE, DRESSING CHANGE Right great toe: 
Cleanse wound and periwound with wound cleanser or normal saline. Epifix 18mm Lot [id number] exp 5- applied by Dr Man rUena and secured with mepilex transfer, abd and rolled gauze. Do not get wet! Change in one week at wound center Epifix #4 to be ordered for 2 week visit 
   
  Standing Status:   Standing Number of Occurrences:   1 Follow-up Information Follow up With Specialties Details Why Contact Info [facility] Wound Care In 1 week thursday dressing change, 2 weeks with MD Gautam Sharma [phone] 
[phone] Signed By: Alexandra Cutler DPM   
 January 2, 2019

## 2019-01-02 NOTE — PROCEDURES
Wound Center Application of Skin Substitute    Patient: Bianca Harrison MRN: [id number]  SSN: [id number]    YOB: 1950  Age: 80 y.o. Sex: male      January 2, 2019     Time Out Taken: Yes    Procedure Performed By: Glenn Marquez DPM    Product Preparation - 's Guidelines Followed. Wound: 1 Right  Non Pressure  Toe To Fat Layer    Application 3 out of 10    Product Applied: Epifix    Application Area: 2 sq cm    Amount Applied: 3 sq cm    Amount Wasted: None    Reconstituted with Normal Saline. Lot Number: [id number]    Expiration Date: 05-     Fenestrated: No    Affixed: Nothing    Dressing Applied: Mepilex Transfer, ABD, rolled gauze    Procedural Pain: Insensate    Post-Procedural Pain: Insensate    Response to Procedure: tolerated the procedure well with no complications    Patient instructed to keep wound area dry. Only change outer dressing if needed.      Signed By: Dave Flores RN     January 2, 2019

## 2019-01-10 ENCOUNTER — HOSPITAL ENCOUNTER (OUTPATIENT)
Dept: WOUND CARE | Age: 69
Discharge: HOME | End: 2019-01-10
Attending: PODIATRIST
Payer: COMMERCIAL

## 2019-01-10 VITALS
HEART RATE: 71 BPM | BODY MASS INDEX: 29.55 KG/M2 | HEIGHT: 70 IN | OXYGEN SATURATION: 98 % | WEIGHT: 206.4 LBS | TEMPERATURE: 98.3 F | RESPIRATION RATE: 16 BRPM | SYSTOLIC BLOOD PRESSURE: 139 MMHG | DIASTOLIC BLOOD PRESSURE: 68 MMHG

## 2019-01-10 PROCEDURE — 99214 OFFICE O/P EST MOD 30 MIN: CPT

## 2019-01-10 NOTE — PROGRESS NOTES
01/10/19 1617 Wound Toe (specify in comments) Right;Medial  
Date First Assessed: 10/31/18   POA: Yes  Wound Type: Diabetic  Location: Toe (specify in comments)  Wound Description (Optional): Great Toe  Orientation: Right;Medial  
DRESSING STATUS Breakthrough drainage DRESSING TYPE (Epifix, Mepilex transfer, dry gauze, rolled gauze) Non-Pressure Injury Full thickness (subcut/muscle) Wound Length (cm) 1.3 cm Wound Width (cm) 1 cm Wound Depth (cm) 0.1 Wound Surface area (cm^2) 1.3 cm^2 Change in Wound Size % 67.5 Condition of Base Granulation;Epithelializing Condition of Edges Calloused Tissue Type Percent Red 100 Drainage Amount  Moderate Drainage Color Serosanguinous Wound Odor Mild Periwound Skin Condition Calloused Cleansing and Cleansing Agents  Normal saline Dressing Type Applied (Acticoat, dry gauze, rolled gauze) Procedure Tolerated Well

## 2019-01-15 PROBLEM — L97.512 DIABETIC ULCER OF TOE OF RIGHT FOOT ASSOCIATED WITH TYPE 2 DIABETES MELLITUS, WITH FAT LAYER EXPOSED (HCC): Status: RESOLVED | Noted: 2018-10-31 | Resolved: 2019-01-15

## 2019-01-15 PROBLEM — E11.621 DIABETIC ULCER OF TOE OF RIGHT FOOT ASSOCIATED WITH TYPE 2 DIABETES MELLITUS, WITH FAT LAYER EXPOSED (HCC): Status: RESOLVED | Noted: 2018-10-31 | Resolved: 2019-01-15

## 2019-01-16 ENCOUNTER — HOSPITAL ENCOUNTER (OUTPATIENT)
Dept: WOUND CARE | Age: 69
Discharge: HOME | End: 2019-01-16
Attending: PODIATRIST
Payer: COMMERCIAL

## 2019-01-16 VITALS
HEIGHT: 70 IN | WEIGHT: 201.2 LBS | TEMPERATURE: 98.2 F | DIASTOLIC BLOOD PRESSURE: 72 MMHG | OXYGEN SATURATION: 96 % | BODY MASS INDEX: 28.8 KG/M2 | SYSTOLIC BLOOD PRESSURE: 119 MMHG | HEART RATE: 71 BPM | RESPIRATION RATE: 18 BRPM

## 2019-01-16 DIAGNOSIS — L97.512 DIABETIC ULCER OF TOE OF RIGHT FOOT ASSOCIATED WITH TYPE 2 DIABETES MELLITUS, WITH FAT LAYER EXPOSED (HCC): Primary | ICD-10-CM

## 2019-01-16 DIAGNOSIS — E11.621 DIABETIC ULCER OF TOE OF RIGHT FOOT ASSOCIATED WITH TYPE 2 DIABETES MELLITUS, WITH FAT LAYER EXPOSED (HCC): Primary | ICD-10-CM

## 2019-01-16 PROCEDURE — 15275 SKIN SUB GRAFT FACE/NK/HF/G: CPT

## 2019-01-16 NOTE — PROGRESS NOTES
Wound Center Progress Note Patient: Eloy De La Garza MRN: [id number]  SSN: [id number] YOB: 1950  Age: 80 y.o. Sex: male Subjective: Chief Complaint: 
Eloy De La Garza is a 80 y.o. WHITE [place]  male who presents with a wound to the right lower extremity at the medial hallux. He admits to reduced erythema. He has returned from his trip to the [employer] and admits to high activity. The dressing did not get wet while away. He presented last Thursday for dressing change with the clinician here. History of Present Illness:    
Nature: Painless Location: medial right hallux, dorsal MPJ Duration: October 2018 Onset: Patient states it started as rubbing from boots Course stable Aggravating/Alleviating: Worsened with pressure and dependency, improved with compression and rest 
Treatment: acticoat Past Medical History:  
Diagnosis Date  Asthma   
 controlled, no rescue inhaler; on Dulera 2 x d; singulair  CAD (coronary artery disease)   
 no MI; CABG May 2013  Candida infection  Diabetes ([address] type 2, insulin dependent since age 30; avg fasting 120; A1C last= 6.7 in March '14; hypo @ 70; today (7/31/14) = 304 fasting  Diabetes with ulcer of foot ([address] 9/30/2014  GERD (gastroesophageal reflux disease) daily prilosec  Hypercholesterolemia  Hypertension   
 controlled with med  NSTEMI, initial episode of care [facility]) 4/26/2013  Pleural effusion on right 10/10/2017  S/P CABG x 4 4/25/2013  Thyroid disease   
 hypo- on med Past Surgical History:  
Procedure Laterality Date  CHEST SURGERY PROCEDURE UNLISTED Right 10/12/2017 Right Thoractomy  HX COLONOSCOPY  01/16/2012  
 due date 01/16/2017  HX CORONARY ARTERY BYPASS GRAFT  4/25/13  
 x4 vessel, Dr. Chad Macias  HX HEART CATHETERIZATION  4/24/2012  
 multivessel  HX OTHER SURGICAL Right   
 upper leg hematoma-\"cut it\" Family History Problem Relation Age of Onset  Cancer Father  Cancer Sister  Hypertension Mother  Diabetes Mother  Stroke Mother  Heart Disease Brother MI- had CABG  
 Diabetes Brother  Heart Disease Sister  Hypertension Sister Social History Tobacco Use  Smoking status: Never Smoker  Smokeless tobacco: Never Used Substance Use Topics  Alcohol use: Yes Alcohol/week: 3.5 oz Types: 7 Standard drinks or equivalent per week Prior to Admission medications Medication Sig Start Date End Date Taking? Authorizing Provider  
levoFLOXacin (LEVAQUIN) 500 mg tablet Take 1 Tab by mouth daily. 1/14/19   Jacy Louis MD  
LANTUS SOLOSTAR U-100 INSULIN 100 unit/mL (3 mL) inpn INJECT 40 UNITS SUBCUTANEOUSLY EVERY EVENING 1/3/19   Ankit REY MD  
insulin aspart U-100 (NOVOLOG FLEXPEN U-100 INSULIN) 100 unit/mL inpn INJECT UP TO 10 UNITS SUBCUTANEOUSLY 3 TIMES DAILY 1/3/19   Jacy Louis MD  
montelukast (SINGULAIR) 10 mg tablet TAKE ONE TABLET BY MOUTH IN THE MORNING FOR  ASTHMA  PREVENTION. 11/19/18   Ankit Acosta MD  
levothyroxine (SYNTHROID) 75 mcg tablet TAKE ONE TABLET BY MOUTH ONCE DAILY BEFORE  BREAKFAST 9/27/18   Ankit REY MD  
albuterol (PROVENTIL HFA, VENTOLIN HFA, PROAIR HFA) 90 mcg/actuation inhaler 2 puffs qid prn 5/16/18   Tong Pratt NP  
insulin glargine (LANTUS SOLOSTAR U-100 INSULIN) 100 unit/mL (3 mL) inpn INJECT 40 UNITS SUBCUTANEOUSLY EVERY EVENING 4/2/18   Jacy Louis MD  
fluticasone-salmeterol (ADVAIR DISKUS) 250-50 mcg/dose diskus inhaler INHALE ONE DOSE BY MOUTH EVERY 12 HOURS 4/2/18   Ankit REY MD  
simvastatin (ZOCOR) 40 mg tablet Take 1 Tab by mouth nightly. 1/15/18   Ankit Acosta MD  
metoprolol tartrate (LOPRESSOR) 25 mg tablet Take 1 Tab by mouth every twelve (12) hours.  1/15/18   Ankit Acosta MD  
 aspirin delayed-release 81 mg tablet Take 81 mg by mouth every morning. Indications: MYOCARDIAL INFARCTION PREVENTION    Provider, Historical  
PRILOSEC OTC PO Take  by mouth every morning. Indications: for GERD    Provider, Historical  
 
No Known Allergies Review of Systems: 
The patient has no difficulty with chest pain or shortness of breath. No fever or chills. No Nausea, vomiting or diarrhea. Comprehensive review of systems was otherwise unremarkable except as noted above. Lab Results Component Value Date/Time Hemoglobin A1c 7.4 (H) 10/07/2014 10:15 AM  
 Hemoglobin A1c (POC) 7.2 (A) 09/27/2018 12:57 PM  
  
 
Immunization History Administered Date(s) Administered  Influenza Vaccine 01/01/2018  Influenza Vaccine (Quad) PF 09/24/2018  Pneumococcal Polysaccharide (PPSV-23) 10/15/2017 Body mass index is 28.87 kg/m². Counseling regarding nutrition done: Yes. Recommend Joaquín nutritional supplement for wound healing. Current medications: 
Current Outpatient Medications Medication Sig Dispense Refill  levoFLOXacin (LEVAQUIN) 500 mg tablet Take 1 Tab by mouth daily. 7 Tab 0  
 LANTUS SOLOSTAR U-100 INSULIN 100 unit/mL (3 mL) inpn INJECT 40 UNITS SUBCUTANEOUSLY EVERY EVENING 15 Pen 11  
 insulin aspart U-100 (NOVOLOG FLEXPEN U-100 INSULIN) 100 unit/mL inpn INJECT UP TO 10 UNITS SUBCUTANEOUSLY 3 TIMES DAILY 15 Pen 3  
 montelukast (SINGULAIR) 10 mg tablet TAKE ONE TABLET BY MOUTH IN THE MORNING FOR  ASTHMA  PREVENTION.  90 Tab 3  
 levothyroxine (SYNTHROID) 75 mcg tablet TAKE ONE TABLET BY MOUTH ONCE DAILY BEFORE  BREAKFAST 90 Tab 3  
 albuterol (PROVENTIL HFA, VENTOLIN HFA, PROAIR HFA) 90 mcg/actuation inhaler 2 puffs qid prn 1 Inhaler 11  
 insulin glargine (LANTUS SOLOSTAR U-100 INSULIN) 100 unit/mL (3 mL) inpn INJECT 40 UNITS SUBCUTANEOUSLY EVERY EVENING 20 Pen 2  
 fluticasone-salmeterol (ADVAIR DISKUS) 250-50 mcg/dose diskus inhaler INHALE ONE DOSE BY MOUTH EVERY 12 HOURS 3 Inhaler 3  
 simvastatin (ZOCOR) 40 mg tablet Take 1 Tab by mouth nightly. 90 Tab 3  
 metoprolol tartrate (LOPRESSOR) 25 mg tablet Take 1 Tab by mouth every twelve (12) hours. 180 Tab 3  
 aspirin delayed-release 81 mg tablet Take 81 mg by mouth every morning. Indications: MYOCARDIAL INFARCTION PREVENTION    
 PRILOSEC OTC PO Take  by mouth every morning. Indications: for GERD Objective:  
 
Physical Exam:  
 
Visit Vitals /72 (BP 1 Location: Right arm) Pulse 71 Temp 98.2 °F (36.8 °C) Resp 18 Ht 5' 10\" (1.778 m) Wt 91.3 kg (201 lb 3.2 oz) SpO2 96% BMI 28.87 kg/m² General: well developed, well nourished, pleasant , NAD. Hygiene good Psych: cooperative. Pleasant. No anxiety or depression. Normal mood and affect. HEENT: Normocephalic, atraumatic. EOMI. Conjunctiva clear. No scleral icterus. Neck: Normal range of motion. No masses. Chest: Good air entry bilaterally. Respirations nonlabored Cardio: Normal heart sounds,no rubs, murmurs or gallops Abdomen: Soft, nontender, nondistended, normoactive bowel sounds Vascular: DP and PT pulses are palpable at 2/4 bilateral. Skin temperature is uniform from proximal to distal bilateral. Hair growth is absent bilateral.   
Derm: Nails 1-5 bilateral are within normal limits. No paronychial infections are noted. Skin is atrophic and xerotic. No subcutaneous masses or hyperpigmented lesions are present. Neuro: Epicritic sensation is presentt bilateral. Protective sensation is present with 5.07 SWMF testing to all 10 sites bilateral. Muscle tone and bulk is symmetric bilateral. 
Msk: Muscle strength is 5/5 for all prime movers of the foot bilateral. ROM of all joints is full and fluid without pain. No effusions are palpable. Ulceration to the medial right hallux with fibrogranular base. There is no bone exposed or palpable. No drainage. No odor.  No erythema or edema or heat is present. Diabetic Foot Ulcer/Neuropathic Is Wound Greater than 1.0 sq cm ? : Yes Re-Current Wound with Skin Substitue within Last Year : Yes X-Ray in Last 3 Months: No 
PRICILLA In Last 6 Months : No 
Smoking Status: Non- Smoker Wound Free from Infection : Wound Culture Completed Is Wound Free of [place] , and / or Bio-[place]: Yes Malignant Process in Wound : No Biopsy Done Hemoglobin A1Cin Last 3 Months: Yes Type of off Loading: Patient is not off loading Ulcer Description: Wound Chest Anterior;Mid (Active) Number of days: 2092 Wound Leg Left (Active) Number of days: 2092 Wound Foot Right (Active) Number of days: 4955 Wound Chest Right (Active) Number of days: 461 Wound Toe (specify in comments) Right;Medial (Active) DRESSING STATUS Breakthrough drainage 1/16/2019 11:11 AM  
Non-Pressure Injury Full thickness (subcut/muscle) 1/16/2019 11:11 AM  
Wound Length (cm) 2 cm 1/16/2019 11:11 AM  
Wound Width (cm) 0.8 cm 1/16/2019 11:11 AM  
Wound Depth (cm) 0.1 1/16/2019 11:11 AM  
Wound Surface area (cm^2) 1.6 cm^2 1/16/2019 11:11 AM  
Change in Wound Size % 60 1/16/2019 11:11 AM  
Condition of Base Granulation;Epithelializing 1/16/2019 11:11 AM  
Condition of Edges Open 1/16/2019 11:11 AM  
Tissue Type Red 11/28/2018  9:33 AM  
Tissue Type Percent Black 5 % 1/2/2019 10:45 AM  
Tissue Type Percent Pink 30 11/7/2018  8:25 AM  
Tissue Type Percent Red 100 1/16/2019 11:11 AM  
Tissue Type Percent Yellow 25 1/2/2019 10:45 AM  
Drainage Amount  Moderate 1/16/2019 11:11 AM  
Drainage Color Serosanguinous 1/16/2019 11:11 AM  
Wound Odor Mild 1/16/2019 11:11 AM  
Periwound Skin Condition Erythema, blanchable 1/16/2019 11:11 AM  
Cleansing and Cleansing Agents  Normal saline 1/16/2019 11:11 AM  
Procedure Tolerated Well 1/10/2019  4:17 PM  
Number of days: 77  
   
[REMOVED] Wound Toe (specify in comments) Left;Dorsal (Removed) DRESSING STATUS Clean, dry, and intact 11/28/2018  9:33 AM  
Non-Pressure Injury Full thickness (subcut/muscle) 11/21/2018 10:04 AM  
Wound Length (cm) 0 cm 11/28/2018  9:33 AM  
Wound Width (cm) 0 cm 11/28/2018  9:33 AM  
Wound Depth (cm) 0 11/28/2018  9:33 AM  
Wound Surface area (cm^2) 0 cm^2 11/28/2018  9:33 AM  
Change in Wound Size % 100 11/28/2018  9:33 AM  
Condition of Base [place] 11/28/2018  9:33 AM  
Epithelialization (%) 100 11/28/2018  9:33 AM  
Tissue Type Percent Pink 100 11/28/2018  9:33 AM  
Tissue Type Percent Red 100 11/21/2018 10:04 AM  
Drainage Amount  None 11/28/2018  9:33 AM  
Drainage Color Serosanguinous 11/21/2018 10:04 AM  
Wound Odor None 11/21/2018 10:04 AM  
Cleansing and Cleansing Agents  Normal saline 11/28/2018  9:33 AM  
Procedure Tolerated Well 11/21/2018 10:04 AM  
Number of days: 14 Data Review: No results found for this or any previous visit (from the past 336 hour(s)). PRICLILA studies in 2014: non compressible bilateral 
 
I independently reviewed recent labs, pathology reports, microbiology reports and radiology studies as noted above. Assessment:  
 
80 y.o. male with diabetic ulceration to the medial right hallux and dorsal left 1st MPJ Plan:  
 
Patient was examined and evaluated today. They were educated on the barriers to healing. Epifix was applied today after wound bed prep. Leave on dry, and intact for 1 week. Return in 1 week for re-application. Any procedures done today in the wound center are documented in a seperate note in connect care made part of this record by reference. Patient instructed on the following: Follow all wound dressing instructions. Do not get dressing or wound wet. May shower if wound can be effectively kept dry. Cast covers may be purchased at [facility] and [facility].  
 
Should you experience increased redness, swelling, pain, foul odor, size of wound(s), or have a temperature over 101 degrees please contact the [facility] Center at [phone] or if after hours contact your primary care physician or go to the hospital emergency department. Orders Placed This Encounter  REFERRAL TO DME Referral Priority:   Routine Referral Type:   Consultation Referral Reason:   Specialty Services Required Number of Visits Requested:   1  
 WOUND CARE, DRESSING CHANGE Right great toe: 
Cleanse wound and periwound with wound cleanser or normal saline. Epifix 18mm Lot [id number] exp 08/31/2023 applied by Dr Layla Miller and secured with mepilex transfer, abd and rolled gauze. Do not get wet! .  
Epifix #5 to be ordered for next week visit. Standing Status:   Standing Number of Occurrences:   1 Follow-up Information Follow up With Specialties Details Why Contact Info [facility] Wound Care In 1 week  [id number] [phone] Signed By: Zena Rouse DPM   
 January 16, 2019

## 2019-01-16 NOTE — WOUND CARE
01/16/19 1111 Wound Toe (specify in comments) Right;Medial  
Date First Assessed: 10/31/18   POA: Yes  Wound Type: Diabetic  Location: Toe (specify in comments)  Wound Description (Optional): Great Toe  Orientation: Right;Medial  
DRESSING STATUS Breakthrough drainage DRESSING TYPE (Acticoat, ABD, Rolled Gauze) Non-Pressure Injury Full thickness (subcut/muscle) Wound Length (cm) 2 cm Wound Width (cm) 0.8 cm Wound Depth (cm) 0.1 Wound Surface area (cm^2) 1.6 cm^2 Change in Wound Size % 60 Condition of Base Granulation;Epithelializing Condition of Edges Open Tissue Type Percent Red 100 Drainage Amount  Moderate Drainage Color Serosanguinous Wound Odor Mild Periwound Skin Condition Erythema, blanchable Cleansing and Cleansing Agents  Normal saline

## 2019-01-16 NOTE — WOUND CARE
[address] [place] [address], [facility] [address] Phone: [phone] Fax: [phone] Patient: Pricila Abraham MRN: [id number]  SSN: [id number] YOB: 1950  Age: 80 y.o. Sex: male Return Appointment: 1 week with Angie Arreaga DPM 
 
Instructions:  
Right great toe: 
Cleanse wound and periwound with wound cleanser or normal saline. Epifix 18mm Lot [id number] exp 08/31/2023 applied by Dr Naa Salcido and secured with mepilex transfer, abd and rolled gauze. Do not get wet! .  
Epifix #5 to be ordered for next week visit. Should you experience increased redness, swelling, pain, foul odor, size of wound(s), or have a temperature over 101 degrees please contact the [address] [place] [address] Road at [phone] or if after hours contact your primary care physician or go to the hospital emergency department. Signed By: Lenny Gonzalez RN   
 January 16, 2019

## 2019-01-16 NOTE — DISCHARGE INSTRUCTIONS
Right great toe:  Cleanse wound and periwound with wound cleanser or normal saline. Epifix 18mm Lot [id number] exp 08/31/2023 applied by Dr Jaun Natarajan and secured with mepilex transfer, abd and rolled gauze. Do not get wet! .   Epifix #5 to be ordered for next week visit.

## 2019-01-16 NOTE — PROCEDURES
Wound Center Application of Skin Substitute    Patient: Jordon Herrera MRN: [id number]  SSN: [id number]    YOB: 1950  Age: 80 y.o. Sex: male      January 16, 2019     Time Out Taken: Yes    Procedure Performed By: Peyman Duncan DPM    Product Preparation - 's Guidelines Followed. Wound: 1 Right  Non Pressure  Toe To Fat Layer    Application 4 out of 10    Product Applied: Epifix    Application Area: 1.6 sq cm    Amount Applied: 3 sq cm    Amount Wasted: None    Reconstituted with Normal Saline. Lot Number: [id number]    Expiration Date: 08-     Fenestrated: No    Affixed: Nothing    Dressing Applied: Mepilex transfer, dry gauze, rolled gauze    Procedural Pain: Insensate    Post-Procedural Pain: Insensate    Response to Procedure: tolerated the procedure well with no complications    Patient instructed to keep wound area dry. Only change outer dressing if needed.      Signed By: Vandana Burrell RN     January 16, 2019

## 2019-01-23 ENCOUNTER — HOSPITAL ENCOUNTER (OUTPATIENT)
Dept: WOUND CARE | Age: 69
Discharge: HOME | End: 2019-01-23
Attending: PODIATRIST
Payer: COMMERCIAL

## 2019-01-23 VITALS
HEIGHT: 70 IN | DIASTOLIC BLOOD PRESSURE: 85 MMHG | WEIGHT: 203 LBS | RESPIRATION RATE: 18 BRPM | HEART RATE: 65 BPM | OXYGEN SATURATION: 99 % | BODY MASS INDEX: 29.06 KG/M2 | TEMPERATURE: 97.6 F | SYSTOLIC BLOOD PRESSURE: 159 MMHG

## 2019-01-23 DIAGNOSIS — E11.621 DIABETIC ULCER OF TOE OF RIGHT FOOT ASSOCIATED WITH TYPE 2 DIABETES MELLITUS, WITH FAT LAYER EXPOSED (HCC): Primary | ICD-10-CM

## 2019-01-23 DIAGNOSIS — L97.512 DIABETIC ULCER OF TOE OF RIGHT FOOT ASSOCIATED WITH TYPE 2 DIABETES MELLITUS, WITH FAT LAYER EXPOSED (HCC): Primary | ICD-10-CM

## 2019-01-23 PROCEDURE — 15275 SKIN SUB GRAFT FACE/NK/HF/G: CPT

## 2019-01-23 NOTE — DISCHARGE INSTRUCTIONS
Right Great Toe:  Cleanse wound and periwound with wound cleanser or normal saline. Epifix applied LOT #:[id number] EXP: 08-; covered with Mepliex Transfer, dry gauze, rolled gauze. Keep in place for 1 week. DO NOT GET DRESSING WET! !

## 2019-01-23 NOTE — PROCEDURES
Wound Center Application of Skin Substitute    Patient: Claire Cha MRN: [id number]  SSN: [id number]    YOB: 1950  Age: 80 y.o. Sex: male      January 23, 2019     Time Out Taken: Yes    Procedure Performed By: Alejo Torres DPM    Product Preparation - 's Guidelines Followed. Wound: 1 Right  Non Pressure  Great Toe To Fat Layer    Application 5 out of 10    Product Applied: Epifix    Application Area: 6.40 sq cm    Amount Applied: 4 sq cm    Amount Wasted: 3 sq cm - Reason for waste wound size    Reconstituted with Normal Saline. Lot Number: [id number]    Expiration Date: 08/01/2023     Fenestrated: No    Affixed: Nothing    Dressing Applied: Mepilex Transfer, Dry gauze, Rolled gauze    Procedural Pain: Insensate    Post-Procedural Pain: Insensate    Response to Procedure: tolerated the procedure well with no complications    Patient instructed to keep wound area dry. Only change outer dressing if needed.      Signed By: Laury Mckeon RN     January 23, 2019

## 2019-01-23 NOTE — WOUND CARE
01/23/19 9507 Wound Toe (specify in comments) Right;Medial  
Date First Assessed: 10/31/18   POA: Yes  Wound Type: Diabetic  Location: Toe (specify in comments)  Wound Description (Optional): Great Toe  Orientation: Right;Medial  
DRESSING STATUS Old drainage DRESSING TYPE (Epifix, mepilex transfer, gauze, rolled gauze) Non-Pressure Injury Full thickness (subcut/muscle) Wound Length (cm) 0.8 cm Wound Width (cm) 0.3 cm Wound Depth (cm) 0.1 Wound Surface area (cm^2) 0.24 cm^2 Change in Wound Size % 94 Condition of Base Granulation;Epithelializing Condition of Edges Open Tissue Type Pink Tissue Type Percent Pink 100 Drainage Amount  Moderate Drainage Color Serosanguinous Wound Odor Mild Periwound Skin Condition Erythema, blanchable Cleansing and Cleansing Agents  Normal saline

## 2019-01-23 NOTE — PROGRESS NOTES
Wound Center Progress Note Patient: Ashley Correa MRN: [id number]  SSN: [id number] YOB: 1950  Age: 80 y.o. Sex: male Subjective: Chief Complaint: 
Ashley Correa is a 80 y.o. WHITE OR  male who presents with a wound to the right lower extremity at the medial hallux. He left the dressing dry, clean and intact. History of Present Illness:    
Nature: Painless Location: medial right hallux, dorsal MPJ Duration: October 2018 Onset: Patient states it started as rubbing from boots Course improving Aggravating/Alleviating: Worsened with pressure and dependency, improved with compression and rest 
Treatment: epifix Past Medical History:  
Diagnosis Date  Asthma   
 controlled, no rescue inhaler; on Dulera 2 x d; singulair  CAD (coronary artery disease)   
 no MI; CABG May 2013  Candida infection  Diabetes ([address] type 2, insulin dependent since age 30; avg fasting 120; A1C last= 6.7 in March '14; hypo @ 70; today (7/31/14) = 304 fasting  Diabetes with ulcer of foot ([address] 9/30/2014  GERD (gastroesophageal reflux disease) daily prilosec  Hypercholesterolemia  Hypertension   
 controlled with med  NSTEMI, [facility] episode of care [facility]) 4/26/2013  Pleural effusion on right 10/10/2017  S/P CABG x 4 4/25/2013  Thyroid disease   
 hypo- on med Past Surgical History:  
Procedure Laterality Date  CHEST SURGERY PROCEDURE UNLISTED Right 10/12/2017 Right Thoractomy  HX COLONOSCOPY  01/16/2012  
 due date 01/16/2017  HX CORONARY ARTERY BYPASS GRAFT  4/25/13  
 x4 vessel, Dr. Brooke Hadley  HX HEART CATHETERIZATION  4/24/2012  
 multivessel  HX OTHER SURGICAL Right   
 upper leg hematoma-\"cut it\" Family History Problem Relation Age of Onset  Cancer Father  Cancer Sister  Hypertension Mother  Diabetes Mother  Stroke Mother  Heart Disease Brother      MI- had CABG  
  Diabetes Brother  Heart Disease Sister  Hypertension Sister Social History Tobacco Use  Smoking status: Never Smoker  Smokeless tobacco: Never Used Substance Use Topics  Alcohol use: Yes Alcohol/week: 3.5 oz Types: 7 Standard drinks or equivalent per week Prior to Admission medications Medication Sig Start Date End Date Taking? Authorizing Provider  
levoFLOXacin (LEVAQUIN) 500 mg tablet Take 1 Tab by mouth daily. 1/14/19   Jenna Louis MD  
LANTUS SOLOSTAR U-100 INSULIN 100 unit/mL (3 mL) inpn INJECT 40 UNITS SUBCUTANEOUSLY EVERY EVENING 1/3/19   Elroy REY MD  
insulin aspart U-100 (NOVOLOG FLEXPEN U-100 INSULIN) 100 unit/mL inpn INJECT UP TO 10 UNITS SUBCUTANEOUSLY 3 TIMES DAILY 1/3/19   Jenna Louis MD  
montelukast (SINGULAIR) 10 mg tablet TAKE ONE TABLET BY MOUTH IN THE MORNING FOR  ASTHMA  PREVENTION. 11/19/18   Elroy Guillen MD  
levothyroxine (SYNTHROID) 75 mcg tablet TAKE ONE TABLET BY MOUTH ONCE DAILY BEFORE  BREAKFAST 9/27/18   Elroy REY MD  
albuterol (PROVENTIL HFA, VENTOLIN HFA, PROAIR HFA) 90 mcg/actuation inhaler 2 puffs qid prn 5/16/18   Hailey Fall NP  
insulin glargine (LANTUS SOLOSTAR U-100 INSULIN) 100 unit/mL (3 mL) inpn INJECT 40 UNITS SUBCUTANEOUSLY EVERY EVENING 4/2/18   Jenna Louis MD  
fluticasone-salmeterol (ADVAIR DISKUS) 250-50 mcg/dose diskus inhaler INHALE ONE DOSE BY MOUTH EVERY 12 HOURS 4/2/18   Elroy REY MD  
simvastatin (ZOCOR) 40 mg tablet Take 1 Tab by mouth nightly. 1/15/18   Elroy Guillen MD  
metoprolol tartrate (LOPRESSOR) 25 mg tablet Take 1 Tab by mouth every twelve (12) hours. 1/15/18   Elroy Guillen MD  
aspirin delayed-release 81 mg tablet Take 81 mg by mouth every morning.  Indications: MYOCARDIAL INFARCTION PREVENTION    Provider, Historical  
 PRILOSEC OTC PO Take  by mouth every morning. Indications: for GERD    Provider, Historical  
 
No Known Allergies Review of Systems: 
The patient has no difficulty with chest pain or shortness of breath. No fever or chills. No Nausea, vomiting or diarrhea. Comprehensive review of systems was otherwise unremarkable except as noted above. Lab Results Component Value Date/Time Hemoglobin A1c 7.4 (H) 10/07/2014 10:15 AM  
 Hemoglobin A1c (POC) 7.2 (A) 09/27/2018 12:57 PM  
  
 
Immunization History Administered Date(s) Administered  Influenza Vaccine 01/01/2018  Influenza Vaccine (Quad) PF 09/24/2018  Pneumococcal Polysaccharide (PPSV-23) 10/15/2017 Body mass index is 29.13 kg/m². Counseling regarding nutrition done: Yes. Recommend Joaquín nutritional supplement for wound healing. Current medications: 
Current Outpatient Medications Medication Sig Dispense Refill  levoFLOXacin (LEVAQUIN) 500 mg tablet Take 1 Tab by mouth daily. 7 Tab 0  
 LANTUS SOLOSTAR U-100 INSULIN 100 unit/mL (3 mL) inpn INJECT 40 UNITS SUBCUTANEOUSLY EVERY EVENING 15 Pen 11  
 insulin aspart U-100 (NOVOLOG FLEXPEN U-100 INSULIN) 100 unit/mL inpn INJECT UP TO 10 UNITS SUBCUTANEOUSLY 3 TIMES DAILY 15 Pen 3  
 montelukast (SINGULAIR) 10 mg tablet TAKE ONE TABLET BY MOUTH IN THE MORNING FOR  ASTHMA  PREVENTION. 90 Tab 3  
 levothyroxine (SYNTHROID) 75 mcg tablet TAKE ONE TABLET BY MOUTH ONCE DAILY BEFORE  BREAKFAST 90 Tab 3  
 albuterol (PROVENTIL HFA, VENTOLIN HFA, PROAIR HFA) 90 mcg/actuation inhaler 2 puffs qid prn 1 Inhaler 11  
 insulin glargine (LANTUS SOLOSTAR U-100 INSULIN) 100 unit/mL (3 mL) inpn INJECT 40 UNITS SUBCUTANEOUSLY EVERY EVENING 20 Pen 2  
 fluticasone-salmeterol (ADVAIR DISKUS) 250-50 mcg/dose diskus inhaler INHALE ONE DOSE BY MOUTH EVERY 12 HOURS 3 Inhaler 3  
 simvastatin (ZOCOR) 40 mg tablet Take 1 Tab by mouth nightly.  90 Tab 3  
  metoprolol tartrate (LOPRESSOR) 25 mg tablet Take 1 Tab by mouth every twelve (12) hours. 180 Tab 3  
 aspirin delayed-release 81 mg tablet Take 81 mg by mouth every morning. Indications: MYOCARDIAL INFARCTION PREVENTION    
 PRILOSEC OTC PO Take  by mouth every morning. Indications: for GERD Objective:  
 
Physical Exam:  
 
Visit Vitals /85 (BP 1 Location: Right arm) Pulse 65 Temp 97.6 °F (36.4 °C) Resp 18 Ht 5' 10\" (1.778 m) Wt 92.1 kg (203 lb) SpO2 99% BMI 29.13 kg/m² General: well developed, well nourished, pleasant , NAD. Hygiene good Psych: cooperative. Pleasant. No anxiety or depression. Normal mood and affect. HEENT: Normocephalic, atraumatic. EOMI. Conjunctiva clear. No scleral icterus. Neck: Normal range of motion. No masses. Chest: Good air entry bilaterally. Respirations nonlabored Cardio: Normal heart sounds,no rubs, murmurs or gallops Abdomen: Soft, nontender, nondistended, normoactive bowel sounds Vascular: DP and PT pulses are palpable at 2/4 bilateral. Skin temperature is uniform from proximal to distal bilateral. Hair growth is absent bilateral.   
Derm: Nails 1-5 bilateral are within normal limits. No paronychial infections are noted. Skin is atrophic and xerotic. No subcutaneous masses or hyperpigmented lesions are present. Neuro: Epicritic sensation is presentt bilateral. Protective sensation is present with 5.07 SWMF testing to all 10 sites bilateral. Muscle tone and bulk is symmetric bilateral. 
Msk: Muscle strength is 5/5 for all prime movers of the foot bilateral. ROM of all joints is full and fluid without pain. No effusions are palpable. Ulceration to the medial right hallux with granular base. There is no bone exposed or palpable. No drainage. No odor. No erythema or edema or heat is present. Improved size. Diabetic Foot Ulcer/Neuropathic Is Wound Greater than 1.0 sq cm ? : Yes 
 Re-Current Wound with Skin Substitue within Last Year : Yes X-Ray in Last 3 Months: No 
PRICILLA In Last 6 Months : No(pt refuses) Smoking Status: Non- Smoker Wound Free from Infection : Wound Culture Completed Is Wound Free of [place] , and / or Bio-[place]: Yes Malignant Process in Wound : No Biopsy Done Hemoglobin A1Cin Last 3 Months: Yes Type of off Loading: Patient is not off loading(patient refuses) Compression Therapy of 20mm or greater ?: No  
 
Ulcer Description: Wound Chest Anterior;Mid (Active) Number of days: 2099 Wound Leg Left (Active) Number of days: 2099 Wound Foot Right (Active) Number of days: 6265 Wound Chest Right (Active) Number of days: 468 Wound Toe (specify in comments) Right;Medial (Active) DRESSING STATUS Old drainage 1/23/2019  9:48 AM  
Non-Pressure Injury Full thickness (subcut/muscle) 1/23/2019  9:48 AM  
Wound Length (cm) 0.8 cm 1/23/2019  9:48 AM  
Wound Width (cm) 0.3 cm 1/23/2019  9:48 AM  
Wound Depth (cm) 0.1 1/23/2019  9:48 AM  
Wound Surface area (cm^2) 0.24 cm^2 1/23/2019  9:48 AM  
Change in Wound Size % 94 1/23/2019  9:48 AM  
Condition of Base Granulation;Epithelializing 1/23/2019  9:48 AM  
Condition of Edges Open 1/23/2019  9:48 AM  
Epithelialization (%) 80 1/23/2019  9:48 AM  
Tissue Type Pink 1/23/2019  9:48 AM  
Tissue Type Percent Black 5 % 1/2/2019 10:45 AM  
Tissue Type Percent Pink 100 1/23/2019  9:48 AM  
Tissue Type Percent Red 100 1/16/2019 11:11 AM  
Tissue Type Percent Yellow 25 1/2/2019 10:45 AM  
Drainage Amount  Moderate 1/23/2019  9:48 AM  
Drainage Color Serosanguinous 1/23/2019  9:48 AM  
Wound Odor Mild 1/23/2019  9:48 AM  
Periwound Skin Condition Erythema, blanchable 1/23/2019  9:48 AM  
Cleansing and Cleansing Agents  Normal saline 1/23/2019  9:48 AM  
Procedure Tolerated Well 1/10/2019  4:17 PM  
Number of days: 84  
   
[REMOVED] Wound Toe (specify in comments) Left;Dorsal (Removed) DRESSING STATUS Clean, dry, and intact 11/28/2018  9:33 AM  
Non-Pressure Injury Full thickness (subcut/muscle) 11/21/2018 10:04 AM  
Wound Length (cm) 0 cm 11/28/2018  9:33 AM  
Wound Width (cm) 0 cm 11/28/2018  9:33 AM  
Wound Depth (cm) 0 11/28/2018  9:33 AM  
Wound Surface area (cm^2) 0 cm^2 11/28/2018  9:33 AM  
Change in Wound Size % 100 11/28/2018  9:33 AM  
Condition of Base [place] 11/28/2018  9:33 AM  
Epithelialization (%) 100 11/28/2018  9:33 AM  
Tissue Type Percent Pink 100 11/28/2018  9:33 AM  
Tissue Type Percent Red 100 11/21/2018 10:04 AM  
Drainage Amount  None 11/28/2018  9:33 AM  
Drainage Color Serosanguinous 11/21/2018 10:04 AM  
Wound Odor None 11/21/2018 10:04 AM  
Cleansing and Cleansing Agents  Normal saline 11/28/2018  9:33 AM  
Procedure Tolerated Well 11/21/2018 10:04 AM  
Number of days: 14 Data Review: No results found for this or any previous visit (from the past 336 hour(s)). PRICILLA studies in 2014: non compressible bilateral 
 
I independently reviewed recent labs, pathology reports, microbiology reports and radiology studies as noted above. Assessment:  
 
80 y.o. male with diabetic ulceration to the medial right hallux and dorsal left 1st MPJ Plan:  
 
Patient was examined and evaluated today. They were educated on the barriers to healing. Epifix was applied today after wound bed prep. Leave on dry, and intact for 1 week. Return in 1 week for re-application. Any procedures done today in the wound center are documented in a seperate note in connect care made part of this record by reference. Patient instructed on the following: Follow all wound dressing instructions. Do not get dressing or wound wet. May shower if wound can be effectively kept dry. Cast covers may be purchased at [facility].  
 
Should you experience increased redness, swelling, pain, foul odor, size of wound(s), or have a temperature over 101 degrees please contact the [facility] at [phone] or if after hours contact your primary care physician or go to the hospital emergency department. Orders Placed This Encounter  REFERRAL TO DME Referral Priority:   Routine Referral Type:   Consultation Referral Reason:   Specialty Services Required Number of Visits Requested:   1  
 WOUND CARE, DRESSING CHANGE Right Great Toe: 
Cleanse wound and periwound with wound cleanser or normal saline. Epifix applied LOT #:[id number] EXP: 08-; covered with Mepliex Transfer, dry gauze, rolled gauze. Keep in place for 1 week. DO NOT GET DRESSING WET!! Standing Status:   Standing Number of Occurrences:   1 Follow-up Information Follow up With Specialties Details Why Contact Info [facility] Wound Care In 1 week  [phone] [phone] Signed By: Ivana Reyna DPM   
 January 23, 2019

## 2019-01-23 NOTE — WOUND CARE
[address] [place] [address] [address] Phone: [phone] Fax: [phone] Patient: Mirtha Hamm MRN: [id number]  SSN: [id number] YOB: 1950  Age: 80 y.o. Sex: male Return Appointment: 1 week with Darin Rubio DPM 
 
Instructions:  
Right Great Toe: 
Cleanse wound and periwound with wound cleanser or normal saline. Epifix applied LOT #:[id number] EXP: 08-; covered with Mepliex Transfer, dry gauze, rolled gauze. Keep in place for 1 week. DO NOT GET DRESSING WET!! Should you experience increased redness, swelling, pain, foul odor, size of wound(s), or have a temperature over 101 degrees please contact the [address] [place] [address] Road at [phone] or if after hours contact your primary care physician or go to the hospital emergency department. Signed By: Daniel Jones RN   
 January 23, 2019

## 2019-01-30 ENCOUNTER — HOSPITAL ENCOUNTER (OUTPATIENT)
Dept: WOUND CARE | Age: 69
Discharge: HOME | End: 2019-01-30
Attending: PODIATRIST
Payer: COMMERCIAL

## 2019-01-30 VITALS
HEART RATE: 69 BPM | TEMPERATURE: 97.8 F | RESPIRATION RATE: 18 BRPM | OXYGEN SATURATION: 99 % | HEIGHT: 70 IN | BODY MASS INDEX: 29.09 KG/M2 | SYSTOLIC BLOOD PRESSURE: 173 MMHG | DIASTOLIC BLOOD PRESSURE: 76 MMHG | WEIGHT: 203.2 LBS

## 2019-01-30 DIAGNOSIS — L97.522 DIABETIC ULCER OF TOE OF LEFT FOOT ASSOCIATED WITH TYPE 2 DIABETES MELLITUS, WITH FAT LAYER EXPOSED (HCC): Primary | ICD-10-CM

## 2019-01-30 DIAGNOSIS — E11.621 DIABETIC ULCER OF TOE OF LEFT FOOT ASSOCIATED WITH TYPE 2 DIABETES MELLITUS, WITH FAT LAYER EXPOSED (HCC): Primary | ICD-10-CM

## 2019-01-30 PROCEDURE — 15275 SKIN SUB GRAFT FACE/NK/HF/G: CPT

## 2019-01-30 NOTE — PROGRESS NOTES
Wound Center Progress Note Patient: Jacob Mercado MRN: [id number]  SSN: [id number] YOB: 1950  Age: 80 y.o. Sex: male Subjective: Chief Complaint: 
Jacob Mercado is a 80 y.o. WHITE OR  male who presents with a wound to the right lower extremity at the medial hallux. He left the dressing dry, clean and intact. History of Present Illness:    
Nature: Painless Location: medial right hallux, dorsal MPJ Duration: October 2018 Onset: Patient states it started as rubbing from boots Course improving Aggravating/Alleviating: Worsened with pressure and dependency, improved with compression and rest 
Treatment: epifix Past Medical History:  
Diagnosis Date  Asthma   
 controlled, no rescue inhaler; on Dulera 2 x d; singulair  CAD (coronary artery disease)   
 no MI; CABG May 2013  Candida infection  Diabetes ([address] type 2, insulin dependent since age 30; avg fasting 120; A1C last= 6.7 in March '14; hypo @ 70; today (7/31/14) = 304 fasting  Diabetes with ulcer of foot ([address] 9/30/2014  GERD (gastroesophageal reflux disease) daily prilosec  Hypercholesterolemia  Hypertension   
 controlled with med  NSTEMI, initial episode of care [facility]) 4/26/2013  Pleural effusion on right 10/10/2017  S/P CABG x 4 4/25/2013  Thyroid disease   
 hypo- on med Past Surgical History:  
Procedure Laterality Date  CHEST SURGERY PROCEDURE UNLISTED Right 10/12/2017 Right Thoractomy  HX COLONOSCOPY  01/16/2012  
 due date 01/16/2017  HX CORONARY ARTERY BYPASS GRAFT  4/25/13  
 x4 vessel, Dr. Patricia Lucio  HX HEART CATHETERIZATION  4/24/2012  
 multivessel  HX OTHER SURGICAL Right   
 upper leg hematoma-\"cut it\" Family History Problem Relation Age of Onset  Cancer Father  Cancer Sister  Hypertension Mother  Diabetes Mother  Stroke Mother  Heart Disease Brother      MI- had CABG  
  Diabetes Brother  Heart Disease Sister  Hypertension Sister Social History Tobacco Use  Smoking status: Never Smoker  Smokeless tobacco: Never Used Substance Use Topics  Alcohol use: Yes Alcohol/week: 3.5 oz Types: 7 Standard drinks or equivalent per week Prior to Admission medications Medication Sig Start Date End Date Taking? Authorizing Provider  
albuterol (PROVENTIL HFA, VENTOLIN HFA, PROAIR HFA) 90 mcg/actuation inhaler 2 puffs qid prn 1/24/19   CAROLYNN White  
LANTUS SOLOSTAR U-100 INSULIN 100 unit/mL (3 mL) inpn INJECT 40 UNITS SUBCUTANEOUSLY EVERY EVENING 1/3/19   Adelaida Fried MD  
insulin aspart U-100 (NOVOLOG FLEXPEN U-100 INSULIN) 100 unit/mL inpn INJECT UP TO 10 UNITS SUBCUTANEOUSLY 3 TIMES DAILY 1/3/19   Hilario Louis MD  
montelukast (SINGULAIR) 10 mg tablet TAKE ONE TABLET BY MOUTH IN THE MORNING FOR  ASTHMA  PREVENTION. 11/19/18   Adelaida Fried MD  
levothyroxine (SYNTHROID) 75 mcg tablet TAKE ONE TABLET BY MOUTH ONCE DAILY BEFORE  BREAKFAST 9/27/18   Adelaida Fried MD  
insulin glargine (LANTUS SOLOSTAR U-100 INSULIN) 100 unit/mL (3 mL) inpn INJECT 40 UNITS SUBCUTANEOUSLY EVERY EVENING 4/2/18   Adelaida REY MD  
fluticasone-salmeterol (ADVAIR DISKUS) 250-50 mcg/dose diskus inhaler INHALE ONE DOSE BY MOUTH EVERY 12 HOURS 4/2/18   Adelaida REY MD  
simvastatin (ZOCOR) 40 mg tablet Take 1 Tab by mouth nightly. 1/15/18   Adelaida Fried MD  
metoprolol tartrate (LOPRESSOR) 25 mg tablet Take 1 Tab by mouth every twelve (12) hours. 1/15/18   Adelaida Fried MD  
aspirin delayed-release 81 mg tablet Take 81 mg by mouth every morning. Indications: MYOCARDIAL INFARCTION PREVENTION    Provider, Historical  
PRILOSEC OTC PO Take  by mouth every morning. Indications: for GERD    Provider, Historical  
 
No Known Allergies Review of Systems: The patient has no difficulty with chest pain or shortness of breath. No fever or chills. No Nausea, vomiting or diarrhea. Comprehensive review of systems was otherwise unremarkable except as noted above. Lab Results Component Value Date/Time Hemoglobin A1c 7.4 (H) 10/07/2014 10:15 AM  
 Hemoglobin A1c (POC) 7.2 (A) 09/27/2018 12:57 PM  
  
 
Immunization History Administered Date(s) Administered  Influenza Vaccine 01/01/2018  Influenza Vaccine (Quad) PF 09/24/2018  Pneumococcal Polysaccharide (PPSV-23) 10/15/2017 Body mass index is 29.16 kg/m². Counseling regarding nutrition done: Yes. Recommend Joaquín nutritional supplement for wound healing. Current medications: 
Current Outpatient Medications Medication Sig Dispense Refill  albuterol (PROVENTIL HFA, VENTOLIN HFA, PROAIR HFA) 90 mcg/actuation inhaler 2 puffs qid prn 3 Inhaler 3  
 LANTUS SOLOSTAR U-100 INSULIN 100 unit/mL (3 mL) inpn INJECT 40 UNITS SUBCUTANEOUSLY EVERY EVENING 15 Pen 11  
 insulin aspart U-100 (NOVOLOG FLEXPEN U-100 INSULIN) 100 unit/mL inpn INJECT UP TO 10 UNITS SUBCUTANEOUSLY 3 TIMES DAILY 15 Pen 3  
 montelukast (SINGULAIR) 10 mg tablet TAKE ONE TABLET BY MOUTH IN THE MORNING FOR  ASTHMA  PREVENTION. 90 Tab 3  
 levothyroxine (SYNTHROID) 75 mcg tablet TAKE ONE TABLET BY MOUTH ONCE DAILY BEFORE  BREAKFAST 90 Tab 3  
 insulin glargine (LANTUS SOLOSTAR U-100 INSULIN) 100 unit/mL (3 mL) inpn INJECT 40 UNITS SUBCUTANEOUSLY EVERY EVENING 20 Pen 2  
 fluticasone-salmeterol (ADVAIR DISKUS) 250-50 mcg/dose diskus inhaler INHALE ONE DOSE BY MOUTH EVERY 12 HOURS 3 Inhaler 3  
 simvastatin (ZOCOR) 40 mg tablet Take 1 Tab by mouth nightly. 90 Tab 3  
 metoprolol tartrate (LOPRESSOR) 25 mg tablet Take 1 Tab by mouth every twelve (12) hours. 180 Tab 3  
 aspirin delayed-release 81 mg tablet Take 81 mg by mouth every morning.  Indications: MYOCARDIAL INFARCTION PREVENTION    
  PRILOSEC OTC PO Take  by mouth every morning. Indications: for GERD Objective:  
 
Physical Exam:  
 
Visit Vitals /76 (BP 1 Location: Right arm) Pulse 69 Temp 97.8 °F (36.6 °C) Resp 18 Ht 5' 10\" (1.778 m) Wt 92.2 kg (203 lb 3.2 oz) SpO2 99% BMI 29.16 kg/m² General: well developed, well nourished, pleasant , NAD. Hygiene good Psych: cooperative. Pleasant. No anxiety or depression. Normal mood and affect. HEENT: Normocephalic, atraumatic. EOMI. Conjunctiva clear. No scleral icterus. Neck: Normal range of motion. No masses. Chest: Good air entry bilaterally. Respirations nonlabored Cardio: Normal heart sounds,no rubs, murmurs or gallops Abdomen: Soft, nontender, nondistended, normoactive bowel sounds Vascular: DP and PT pulses are palpable at 2/4 bilateral. Skin temperature is uniform from proximal to distal bilateral. Hair growth is absent bilateral.   
Derm: Nails 1-5 bilateral are within normal limits. No paronychial infections are noted. Skin is atrophic and xerotic. No subcutaneous masses or hyperpigmented lesions are present. Neuro: Epicritic sensation is presentt bilateral. Protective sensation is present with 5.07 SWMF testing to all 10 sites bilateral. Muscle tone and bulk is symmetric bilateral. 
Msk: Muscle strength is 5/5 for all prime movers of the foot bilateral. ROM of all joints is full and fluid without pain. No effusions are palpable. Ulceration to the medial right hallux with granular base. There is no bone exposed or palpable. No drainage. No odor. No erythema or edema or heat is present. Improved size. Diabetic Foot Ulcer/Neuropathic Is Wound Greater than 1.0 sq cm ? : Yes Re-Current Wound with Skin Substitue within Last Year : Yes X-Ray in Last 3 Months: No 
PRICILLA In Last 6 Months : No 
Smoking Status: Non- Smoker Wound Free from Infection : Wound Culture Completed Is Wound Free of [address], [place] , and / or Bio-Todd:  Yes 
 Malignant Process in Wound : No Biopsy Done Hemoglobin A1Cin Last 3 Months: Yes Type of off Loading: Patient is not off loading Compression Therapy of 20mm or greater ?: No  
 
Ulcer Description: Wound Chest Anterior;Mid (Active) Number of days: 2106 Wound Leg Left (Active) Number of days: 2106 Wound Foot Right (Active) Number of days: 8746 Wound Chest Right (Active) Number of days: 549 Wound Toe (specify in comments) Right;Medial (Active) Dressing Status  Old drainage 1/30/2019 10:07 AM  
Non-Pressure Injury Full thickness (subcut/muscle) 1/30/2019 10:07 AM  
Wound Length (cm) 0.7 cm 1/30/2019 10:07 AM  
Wound Width (cm) 0.2 cm 1/30/2019 10:07 AM  
Wound Depth (cm) 0.1 1/30/2019 10:07 AM  
Wound Surface area (cm^2) 0.14 cm^2 1/30/2019 10:07 AM  
Change in Wound Size % 96.5 1/30/2019 10:07 AM  
Condition of Base Granulation;Epithelializing 1/30/2019 10:07 AM  
Condition of Edges Open 1/30/2019 10:07 AM  
Epithelialization (%) 90 1/30/2019 10:07 AM  
Tissue Type Pink 1/30/2019 10:07 AM  
Tissue Type Percent Black 5 % 1/2/2019 10:45 AM  
Tissue Type Percent Pink 100 1/30/2019 10:07 AM  
Tissue Type Percent Red 100 1/16/2019 11:11 AM  
Tissue Type Percent Yellow 25 1/2/2019 10:45 AM  
Drainage Amount  Moderate 1/30/2019 10:07 AM  
Drainage Color Serosanguinous 1/30/2019 10:07 AM  
Wound Odor Mild 1/30/2019 10:07 AM  
Periwound Skin Condition Erythema, blanchable 1/30/2019 10:07 AM  
Cleansing and Cleansing Agents  Normal saline 1/30/2019 10:07 AM  
Procedure Tolerated Well 1/10/2019  4:17 PM  
Number of days: 91  
   
[REMOVED] Wound Toe (specify in comments) Left;Dorsal (Removed) Dressing Status  Clean, dry, and intact 11/28/2018  9:33 AM  
Non-Pressure Injury Full thickness (subcut/muscle) 11/21/2018 10:04 AM  
Wound Length (cm) 0 cm 11/28/2018  9:33 AM  
Wound Width (cm) 0 cm 11/28/2018  9:33 AM  
Wound Depth (cm) 0 11/28/2018  9:33 AM  
 Wound Surface area (cm^2) 0 cm^2 11/28/2018  9:33 AM  
Change in Wound Size % 100 11/28/2018  9:33 AM  
Condition of Base [place] 11/28/2018  9:33 AM  
Epithelialization (%) 100 11/28/2018  9:33 AM  
Tissue Type Percent Pink 100 11/28/2018  9:33 AM  
Tissue Type Percent Red 100 11/21/2018 10:04 AM  
Drainage Amount  None 11/28/2018  9:33 AM  
Drainage Color Serosanguinous 11/21/2018 10:04 AM  
Wound Odor None 11/21/2018 10:04 AM  
Cleansing and Cleansing Agents  Normal saline 11/28/2018  9:33 AM  
Procedure Tolerated Well 11/21/2018 10:04 AM  
Number of days: 14 Data Review: No results found for this or any previous visit (from the past 336 hour(s)). PRICILLA studies in 2014: non compressible bilateral 
 
I independently reviewed recent labs, pathology reports, microbiology reports and radiology studies as noted above. Assessment:  
 
80 y.o. male with diabetic ulceration to the medial right hallux and dorsal left 1st MPJ Plan:  
 
Patient was examined and evaluated today. They were educated on the barriers to healing. Epifix was applied today after wound bed prep. Leave on dry, and intact for 1 week. Return in 1 week for re-application. Any procedures done today in the wound center are documented in a seperate note in connect care made part of this record by reference. Patient instructed on the following: Follow all wound dressing instructions. Do not get dressing or wound wet. May shower if wound can be effectively kept dry. Cast covers may be purchased at [facility] and [facility]. Should you experience increased redness, swelling, pain, foul odor, size of wound(s), or have a temperature over 101 degrees please contact the [address] [place] [address] Road at [phone] or if after hours contact your primary care physician or go to the hospital emergency department. Orders Placed This Encounter  REFERRAL TO DME Referral Priority:   Routine Referral Type:   Consultation Referral Reason:   Specialty Services Required Number of Visits Requested:   1  
 WOUND CARE, DRESSING CHANGE Right Great Toe: 
Cleanse wound and periwound with wound cleanser or normal saline. Epifix applied LOT #:[id number] EXP: 08-; covered with Mepliex Transfer, dry gauze, rolled gauze. Keep in place for 1 week. DO NOT GET DRESSING WET!! Standing Status:   Standing Number of Occurrences:   1 Follow-up Information Follow up With Specialties Details Why Contact Info [facility] Wound Care In 1 week  [id number] 
[phone] Signed By: Valeria Benitez DPM   
 January 30, 2019

## 2019-01-30 NOTE — DISCHARGE INSTRUCTIONS
Cleanse wound and periwound with wound cleanser or normal saline. Epifix applied LOT #:[id number] EXP: 08-; covered with Mepliex Transfer, dry gauze, rolled gauze. Keep in place for 1 week. DO NOT GET DRESSING WET! !

## 2019-01-30 NOTE — PROCEDURES
Wound Center Application of Skin Substitute    Patient: Isabela Lazaro MRN: [id number]  SSN: [id number]    YOB: 1950  Age: 80 y.o. Sex: male      January 30, 2019     Time Out Taken: Yes    Procedure Performed By: Doralee Dubin, DPM    Product Preparation - 's Guidelines Followed. Wound: 1 Right  Non Pressure  Great Toe To Fat Layer    Application 6 out of 10    Product Applied: Epifix    Application Area: 3.59 sq cm    Amount Applied: 3 sq cm    Amount Wasted: None    Reconstituted with Normal Saline. Lot Number: [id number]    Expiration Date: 08-     Fenestrated: No    Affixed: Nothing    Dressing Applied: Mepilex Transfer, dry gauze, rolled gauze. Procedural Pain: Insensate    Post-Procedural Pain: Insensate    Response to Procedure: tolerated the procedure well with no complications    Patient instructed to keep wound area dry. Only change outer dressing if needed.      Signed By: Oneyda Grant RN     January 30, 2019

## 2019-01-30 NOTE — WOUND CARE
[address] [place] [address], [facility] [address] Phone: [phone] Fax: [phone] Patient: Efe Gates MRN: [id number]  SSN: [id number] YOB: 1950  Age: 80 y.o. Sex: male Return Appointment: 1 week with Brandon Falk DPM 
 
Instructions:  
Cleanse wound and periwound with wound cleanser or normal saline. Epifix applied LOT #:[id number] EXP: 08-; covered with Mepliex Transfer, dry gauze, rolled gauze. Keep in place for 1 week. DO NOT GET DRESSING WET!! Should you experience increased redness, swelling, pain, foul odor, size of wound(s), or have a temperature over 101 degrees please contact the [address] [place] [address] Road at [phone] or if after hours contact your primary care physician or go to the hospital emergency department. Signed By: Pari Mckeon RN   
 January 30, 2019

## 2019-01-30 NOTE — WOUND CARE
01/30/19 1007 Wound Toe (specify in comments) Right;Medial  
Date First Assessed: 10/31/18   POA: Yes  Wound Type: Diabetic  Location: Toe (specify in comments)  Wound Description (Optional): Great Toe  Orientation: Right;Medial  
Dressing Status  Old drainage Dressing Type  (epifix, transfer, abd, rolled gauze) Non-Pressure Injury Full thickness (subcut/muscle) Wound Length (cm) 0.7 cm Wound Width (cm) 0.2 cm Wound Depth (cm) 0.1 Wound Surface area (cm^2) 0.14 cm^2 Change in Wound Size % 96.5 Condition of Base Granulation;Epithelializing Condition of Edges Open Epithelialization (%) 90 Tissue Type Pink Tissue Type Percent Pink 100 Drainage Amount  Moderate Drainage Color Serosanguinous Wound Odor Mild Periwound Skin Condition Erythema, blanchable Cleansing and Cleansing Agents  Normal saline **PATIENT IS ON AN ANTICOAGULANT ASPIRIN 81MG DAILY. **

## 2019-02-06 ENCOUNTER — HOSPITAL ENCOUNTER (OUTPATIENT)
Dept: WOUND CARE | Age: 69
Discharge: HOME | End: 2019-02-06
Attending: PODIATRIST
Payer: COMMERCIAL

## 2019-02-06 VITALS
RESPIRATION RATE: 18 BRPM | OXYGEN SATURATION: 97 % | SYSTOLIC BLOOD PRESSURE: 141 MMHG | WEIGHT: 201 LBS | BODY MASS INDEX: 28.77 KG/M2 | HEART RATE: 65 BPM | HEIGHT: 70 IN | TEMPERATURE: 97.7 F | DIASTOLIC BLOOD PRESSURE: 81 MMHG

## 2019-02-06 DIAGNOSIS — E11.621 DIABETIC ULCER OF TOE OF RIGHT FOOT ASSOCIATED WITH TYPE 2 DIABETES MELLITUS, WITH FAT LAYER EXPOSED (HCC): Primary | ICD-10-CM

## 2019-02-06 DIAGNOSIS — L97.512 DIABETIC ULCER OF TOE OF RIGHT FOOT ASSOCIATED WITH TYPE 2 DIABETES MELLITUS, WITH FAT LAYER EXPOSED (HCC): Primary | ICD-10-CM

## 2019-02-06 PROCEDURE — 15275 SKIN SUB GRAFT FACE/NK/HF/G: CPT

## 2019-02-06 NOTE — PROCEDURES
Wound Center Application of Skin Substitute    Patient: Didier Beth MRN: [id number]  SSN: [id number]    YOB: 1950  Age: 80 y.o. Sex: male      February 6, 2019     Time Out Taken: Yes    Procedure Performed By: Jack Matos DPM    Product Preparation - 's Guidelines Followed. Wound:  Right  Non Pressure Great Toe Breakdown of Skin     Application 7 out of 10    Product Applied: Epifix    Application Area: 0.6 sq cm    Amount Applied: 3 sq cm    Amount Wasted: None    Reconstituted with Normal Saline. Lot Number: [id number]    Expiration Date: 9/1/2023     Fenestrated: No    Affixed: Mepilex transfer    Dressing Applied: Mepilex transfer, dry gauze, rolled gauze    Procedural Pain: Insensate    Post-Procedural Pain: Insensate    Response to Procedure: tolerated the procedure well with no complications    Patient instructed to keep wound area dry. Only change outer dressing if needed.      Signed By: Kati West PT, [facility]     February 6, 2019

## 2019-02-06 NOTE — PROGRESS NOTES
02/06/19 0901 Wound Toe (specify in comments) Right;Medial  
Date First Assessed: 10/31/18   POA: Yes  Wound Type: Diabetic  Location: Toe (specify in comments)  Wound Description (Optional): Great Toe  Orientation: Right;Medial  
Dressing Status  Old drainage Dressing Type  (Epifix, Mepilex transfer, dry gauze, rolled gauze) Non-Pressure Injury Full thickness (subcut/muscle) Wound Length (cm) 0.6 cm Wound Width (cm) 0.1 cm Wound Depth (cm) 0.1 Wound Surface area (cm^2) 0.06 cm^2 Change in Wound Size % 98.5 Condition of Base Granulation Condition of Edges Calloused Tissue Type Pink Tissue Type Percent Pink 100 Drainage Amount  Small Drainage Color Serous Wound Odor None Periwound Skin Condition Calloused Cleansing and Cleansing Agents  Normal saline Patient is currently taking Aspirin 81 mg.

## 2019-02-06 NOTE — PROGRESS NOTES
Wound Center Progress Note Patient: Pricila Abraham MRN: [id number]  SSN: [id number] YOB: 1950  Age: 80 y.o. Sex: male Subjective: Chief Complaint: 
Pricila Abraham is a 80 y.o. WHITE OR  male who presents with a wound to the right lower extremity at the medial hallux. He left the dressing dry, clean and intact. He informs us that he went in flip flops to work on his boat over the weekend and continues to not offload properly. History of Present Illness:    
Nature: Painless Location: medial right hallux, dorsal MPJ Duration: October 2018 Onset: Patient states it started as rubbing from boots Course improving Aggravating/Alleviating: Worsened with pressure and dependency, improved with compression and rest 
Treatment: epifix Past Medical History:  
Diagnosis Date  Asthma   
 controlled, no rescue inhaler; on Dulera 2 x d; singulair  CAD (coronary artery disease)   
 no MI; CABG May 2013  Candida infection  Diabetes ([address] type 2, insulin dependent since age 30; avg fasting 120; A1C last= 6.7 in March '14; hypo @ 70; today (7/31/14) = 304 fasting  Diabetes with ulcer of foot ([address] 9/30/2014  GERD (gastroesophageal reflux disease) daily prilosec  Hypercholesterolemia  Hypertension   
 controlled with med  NSTEMI, initial episode of care [facility]) 4/26/2013  Pleural effusion on right 10/10/2017  S/P CABG x 4 4/25/2013  Thyroid disease   
 hypo- on med Past Surgical History:  
Procedure Laterality Date  CHEST SURGERY PROCEDURE UNLISTED Right 10/12/2017 Right Thoractomy  HX COLONOSCOPY  01/16/2012  
 due date 01/16/2017  HX CORONARY ARTERY BYPASS GRAFT  4/25/13  
 x4 vessel, Dr. Eb Powell  HX HEART CATHETERIZATION  4/24/2012  
 multivessel  HX OTHER SURGICAL Right   
 upper leg hematoma-\"cut it\" Family History Problem Relation Age of Onset  Cancer Father  Cancer Sister  Hypertension Mother  Diabetes Mother  Stroke Mother  Heart Disease Brother MI- had CABG  
 Diabetes Brother  Heart Disease Sister  Hypertension Sister Social History Tobacco Use  Smoking status: Never Smoker  Smokeless tobacco: Never Used Substance Use Topics  Alcohol use: Yes Alcohol/week: 3.5 oz Types: 7 Standard drinks or equivalent per week Prior to Admission medications Medication Sig Start Date End Date Taking? Authorizing Provider  
albuterol (PROVENTIL HFA, VENTOLIN HFA, PROAIR HFA) 90 mcg/actuation inhaler 2 puffs qid prn 1/24/19   CAROLYNN Bedoya  
LANTUS SOLOSTAR U-100 INSULIN 100 unit/mL (3 mL) inpn INJECT 40 UNITS SUBCUTANEOUSLY EVERY EVENING 1/3/19   Becky Jones MD  
insulin aspart U-100 (NOVOLOG FLEXPEN U-100 INSULIN) 100 unit/mL inpn INJECT UP TO 10 UNITS SUBCUTANEOUSLY 3 TIMES DAILY 1/3/19   Vijay Louis MD  
montelukast (SINGULAIR) 10 mg tablet TAKE ONE TABLET BY MOUTH IN THE MORNING FOR  ASTHMA  PREVENTION. 11/19/18   Becky Jones MD  
levothyroxine (SYNTHROID) 75 mcg tablet TAKE ONE TABLET BY MOUTH ONCE DAILY BEFORE  BREAKFAST 9/27/18   Becky Jones MD  
insulin glargine (LANTUS SOLOSTAR U-100 INSULIN) 100 unit/mL (3 mL) inpn INJECT 40 UNITS SUBCUTANEOUSLY EVERY EVENING 4/2/18   Becky REY MD  
fluticasone-salmeterol (ADVAIR DISKUS) 250-50 mcg/dose diskus inhaler INHALE ONE DOSE BY MOUTH EVERY 12 HOURS 4/2/18   Becky REY MD  
simvastatin (ZOCOR) 40 mg tablet Take 1 Tab by mouth nightly. 1/15/18   Becky Jones MD  
metoprolol tartrate (LOPRESSOR) 25 mg tablet Take 1 Tab by mouth every twelve (12) hours. 1/15/18   Becky Jones MD  
aspirin delayed-release 81 mg tablet Take 81 mg by mouth every morning.  Indications: MYOCARDIAL INFARCTION PREVENTION    Provider, Historical  
 PRILOSEC OTC PO Take  by mouth every morning. Indications: for GERD    Provider, Historical  
 
No Known Allergies Review of Systems: 
The patient has no difficulty with chest pain or shortness of breath. No fever or chills. No Nausea, vomiting or diarrhea. Comprehensive review of systems was otherwise unremarkable except as noted above. Lab Results Component Value Date/Time Hemoglobin A1c 7.4 (H) 10/07/2014 10:15 AM  
 Hemoglobin A1c (POC) 7.2 (A) 09/27/2018 12:57 PM  
  
 
Immunization History Administered Date(s) Administered  Influenza Vaccine 01/01/2018  Influenza Vaccine (Quad) PF 09/24/2018  Pneumococcal Polysaccharide (PPSV-23) 10/15/2017 Body mass index is 28.84 kg/m². Counseling regarding nutrition done: Yes. Recommend Joaquín nutritional supplement for wound healing. Current medications: 
Current Outpatient Medications Medication Sig Dispense Refill  albuterol (PROVENTIL HFA, VENTOLIN HFA, PROAIR HFA) 90 mcg/actuation inhaler 2 puffs qid prn 3 Inhaler 3  
 LANTUS SOLOSTAR U-100 INSULIN 100 unit/mL (3 mL) inpn INJECT 40 UNITS SUBCUTANEOUSLY EVERY EVENING 15 Pen 11  
 insulin aspart U-100 (NOVOLOG FLEXPEN U-100 INSULIN) 100 unit/mL inpn INJECT UP TO 10 UNITS SUBCUTANEOUSLY 3 TIMES DAILY 15 Pen 3  
 montelukast (SINGULAIR) 10 mg tablet TAKE ONE TABLET BY MOUTH IN THE MORNING FOR  ASTHMA  PREVENTION. 90 Tab 3  
 levothyroxine (SYNTHROID) 75 mcg tablet TAKE ONE TABLET BY MOUTH ONCE DAILY BEFORE  BREAKFAST 90 Tab 3  
 insulin glargine (LANTUS SOLOSTAR U-100 INSULIN) 100 unit/mL (3 mL) inpn INJECT 40 UNITS SUBCUTANEOUSLY EVERY EVENING 20 Pen 2  
 fluticasone-salmeterol (ADVAIR DISKUS) 250-50 mcg/dose diskus inhaler INHALE ONE DOSE BY MOUTH EVERY 12 HOURS 3 Inhaler 3  
 simvastatin (ZOCOR) 40 mg tablet Take 1 Tab by mouth nightly. 90 Tab 3  
 metoprolol tartrate (LOPRESSOR) 25 mg tablet Take 1 Tab by mouth every twelve (12) hours.  180 Tab 3  
  aspirin delayed-release 81 mg tablet Take 81 mg by mouth every morning. Indications: MYOCARDIAL INFARCTION PREVENTION    
 PRILOSEC OTC PO Take  by mouth every morning. Indications: for GERD Objective:  
 
Physical Exam:  
 
Visit Vitals /81 (BP 1 Location: Right arm) Pulse 65 Temp 97.7 °F (36.5 °C) Resp 18 Ht 5' 10\" (1.778 m) Wt 91.2 kg (201 lb) SpO2 97% BMI 28.84 kg/m² General: well developed, well nourished, pleasant , NAD. Hygiene good Psych: cooperative. Pleasant. No anxiety or depression. Normal mood and affect. HEENT: Normocephalic, atraumatic. EOMI. Conjunctiva clear. No scleral icterus. Neck: Normal range of motion. No masses. Chest: Good air entry bilaterally. Respirations nonlabored Cardio: Normal heart sounds,no rubs, murmurs or gallops Abdomen: Soft, nontender, nondistended, normoactive bowel sounds Vascular: DP and PT pulses are palpable at 2/4 bilateral. Skin temperature is uniform from proximal to distal bilateral. Hair growth is absent bilateral.   
Derm: Nails 1-5 bilateral are within normal limits. No paronychial infections are noted. Skin is atrophic and xerotic. No subcutaneous masses or hyperpigmented lesions are present. Neuro: Epicritic sensation is presentt bilateral. Protective sensation is present with 5.07 SWMF testing to all 10 sites bilateral. Muscle tone and bulk is symmetric bilateral. 
Msk: Muscle strength is 5/5 for all prime movers of the foot bilateral. ROM of all joints is full and fluid without pain. No effusions are palpable. Ulceration to the medial right hallux with granular base. Heavy callusing. There is no bone exposed or palpable. No drainage. No odor. No erythema or edema or heat is present. Improved size. Diabetic Foot Ulcer/Neuropathic Is Wound Greater than 1.0 sq cm ? : Yes Re-Current Wound with Skin Substitue within Last Year : Yes X-Ray in Last 3 Months: No 
PRICILLA In Last 6 Months : No 
 Smoking Status: Non- Smoker Wound Free from Infection : Wound Culture Completed Is Wound Free of [place] , and / or Bio-Jack: Yes Malignant Process in Wound : No Biopsy Done Hemoglobin A1Cin Last 3 Months: Yes Type of off Loading: Patient is not off loading Compression Therapy of 20mm or greater ?: No  
 
Ulcer Description: Wound Chest Anterior;Mid (Active) Number of days: 2113 Wound Leg Left (Active) Number of days: 2113 Wound Foot Right (Active) Number of days: 8875 Wound Chest Right (Active) Number of days: 482 Wound Toe (specify in comments) Right;Medial (Active) Dressing Status  Old drainage 2/6/2019  9:01 AM  
Non-Pressure Injury Full thickness (subcut/muscle) 2/6/2019  9:01 AM  
Wound Length (cm) 0.6 cm 2/6/2019  9:01 AM  
Wound Width (cm) 0.1 cm 2/6/2019  9:01 AM  
Wound Depth (cm) 0.1 2/6/2019  9:01 AM  
Wound Surface area (cm^2) 0.06 cm^2 2/6/2019  9:01 AM  
Change in Wound Size % 98.5 2/6/2019  9:01 AM  
Condition of Base Granulation 2/6/2019  9:01 AM  
Condition of Edges Calloused 2/6/2019  9:01 AM  
Epithelialization (%) 90 1/30/2019 10:07 AM  
Tissue Type Pink 2/6/2019  9:01 AM  
Tissue Type Percent Black 5 % 1/2/2019 10:45 AM  
Tissue Type Percent Pink 100 2/6/2019  9:01 AM  
Tissue Type Percent Red 100 1/16/2019 11:11 AM  
Tissue Type Percent Yellow 25 1/2/2019 10:45 AM  
Drainage Amount  Small  2/6/2019  9:01 AM  
Drainage Color Serous 2/6/2019  9:01 AM  
Wound Odor None 2/6/2019  9:01 AM  
Periwound Skin Condition Calloused 2/6/2019  9:01 AM  
Cleansing and Cleansing Agents  Normal saline 2/6/2019  9:01 AM  
Procedure Tolerated Well 1/10/2019  4:17 PM  
Number of days: 98  
   
[REMOVED] Wound Toe (specify in comments) Left;Dorsal (Removed) Dressing Status  Clean, dry, and intact 11/28/2018  9:33 AM  
Non-Pressure Injury Full thickness (subcut/muscle) 11/21/2018 10:04 AM  
Wound Length (cm) 0 cm 11/28/2018  9:33 AM  
 Wound Width (cm) 0 cm 11/28/2018  9:33 AM  
Wound Depth (cm) 0 11/28/2018  9:33 AM  
Wound Surface area (cm^2) 0 cm^2 11/28/2018  9:33 AM  
Change in Wound Size % 100 11/28/2018  9:33 AM  
Condition of Base [place]bie 11/28/2018  9:33 AM  
Epithelialization (%) 100 11/28/2018  9:33 AM  
Tissue Type Percent Pink 100 11/28/2018  9:33 AM  
Tissue Type Percent Red 100 11/21/2018 10:04 AM  
Drainage Amount  None 11/28/2018  9:33 AM  
Drainage Color Serosanguinous 11/21/2018 10:04 AM  
Wound Odor None 11/21/2018 10:04 AM  
Cleansing and Cleansing Agents  Normal saline 11/28/2018  9:33 AM  
Procedure Tolerated Well 11/21/2018 10:04 AM  
Number of days: 14 Data Review: No results found for this or any previous visit (from the past 336 hour(s)). PRICILLA studies in 2014: non compressible bilateral 
 
I independently reviewed recent labs, pathology reports, microbiology reports and radiology studies as noted above. Assessment:  
 
80 y.o. male with diabetic ulceration to the medial right hallux and dorsal left 1st MPJ Plan:  
 
Patient was examined and evaluated today. They were educated on the barriers to healing. He was educated in depth on the importance of offloading and was given a new prescription for a darco wedge shoe to ear at all times. No barefoot or flip flops. Epifix was applied today after wound bed prep. Leave on dry, and intact for 1 week. Return in 1 week for re-application. Any procedures done today in the wound center are documented in a seperate note in connect care made part of this record by reference. Patient instructed on the following: Follow all wound dressing instructions. Do not get dressing or wound wet. May shower if wound can be effectively kept dry. Cast covers may be purchased at [facility] and \[facility] [place] [facility]\"".  
 
Should you experience increased redness, swelling, pain, foul odor, size of wound(s), or have a temperature over 101 degrees please contact the [facility] at [phone] or if after hours contact your primary care physician or go to the hospital emergency department. Orders Placed This Encounter  REFERRAL TO DME Referral Priority:   Routine Referral Type:   Consultation Referral Reason:   Specialty Services Required Number of Visits Requested:   1  
 WOUND CARE, DRESSING CHANGE Right medial great toe Cleanse wound and periwound with wound cleanser or normal saline. Epifix applied LOT #:[id number] EXP: 09-; covered with Mepliex Transfer (fenestrated), dry gauze, rolled gauze. Keep in place for 1 week. DO NOT GET DRESSING WET!! Obtain Darco Wedge shoe for right foot and begin wearing as soon as possible (prescription given). Standing Status:   Standing Number of Occurrences:   1 Follow-up Information Follow up With Specialties Details Why Contact Info Sandy Reynaga DPM Podiatry In 1 week For wound re-check [facility] [address] [address] 
[phone] Signed By: Laxmi Shah DPM   
 February 6, 2019

## 2019-02-06 NOTE — WOUND CARE
[address] [place] [address] [address] Phone: [phone] Fax: [phone] Patient: Tameka Monte MRN: [id number]  SSN: [id number] YOB: 1950  Age: 80 y.o. Sex: male Return Appointment: 1 week with Berkley Edgar DPM 
 
Instructions: Right medial great toe Cleanse wound and periwound with wound cleanser or normal saline. Epifix applied LOT #:[id number] EXP: 09-1-2023; covered with Mepliex Transfer (fenestrated), dry gauze, rolled gauze. Keep in place for 1 week. DO NOT GET DRESSING WET!! Obtain Darco Wedge shoe for right foot and begin wearing as soon as possible (prescription given). Should you experience increased redness, swelling, pain, foul odor, size of wound(s), or have a temperature over 101 degrees please contact the [address] [place] [address] Road at [phone] or if after hours contact your primary care physician or go to the hospital emergency department. Signed By: Zainab Ford PT, [facility] February 6, 2019

## 2019-02-13 ENCOUNTER — HOSPITAL ENCOUNTER (OUTPATIENT)
Dept: WOUND CARE | Age: 69
Discharge: HOME | End: 2019-02-13
Attending: PODIATRIST
Payer: COMMERCIAL

## 2019-02-13 VITALS
OXYGEN SATURATION: 96 % | SYSTOLIC BLOOD PRESSURE: 143 MMHG | TEMPERATURE: 97.7 F | HEIGHT: 70 IN | DIASTOLIC BLOOD PRESSURE: 78 MMHG | BODY MASS INDEX: 29.35 KG/M2 | HEART RATE: 65 BPM | RESPIRATION RATE: 18 BRPM | WEIGHT: 205 LBS

## 2019-02-13 PROCEDURE — 97597 DBRDMT OPN WND 1ST 20 CM/<: CPT

## 2019-02-13 NOTE — PROGRESS NOTES
Wound Center Progress Note Patient: Claire Cha MRN: [id number]  SSN: [id number] YOB: 1950  Age: 80 y.o. Sex: male Subjective: Chief Complaint: 
Claire Cha is a 80 y.o. WHITE OR  male who presents with a wound to the right lower extremity at the medial hallux. He left the dressing dry, clean and intact. He is in the darco wedge shoe full time and has not played golf. History of Present Illness:    
Nature: Painless Location: medial right hallux, dorsal MPJ Duration: October 2018 Onset: Patient states it started as rubbing from boots Course improving Aggravating/Alleviating: Worsened with pressure and dependency, improved with compression and rest 
Treatment: epifix Past Medical History:  
Diagnosis Date  Asthma   
 controlled, no rescue inhaler; on Dulera 2 x d; singulair  CAD (coronary artery disease)   
 no MI; CABG May 2013  Candida infection  Diabetes ([facility]) type 2, insulin dependent since age 30; avg fasting 120; A1C last= 6.7 in March '14; hypo @ 70; today (7/31/14) = 304 fasting  Diabetes with ulcer of foot ([facility]) 9/30/2014  GERD (gastroesophageal reflux disease) daily prilosec  Hypercholesterolemia  Hypertension   
 controlled with med  NSTEMI, initial episode of care [facility]) 4/26/2013  Pleural effusion on right 10/10/2017  S/P CABG x 4 4/25/2013  Thyroid disease   
 hypo- on med Past Surgical History:  
Procedure Laterality Date  CHEST SURGERY PROCEDURE UNLISTED Right 10/12/2017 Right Thoractomy  HX COLONOSCOPY  01/16/2012  
 due date 01/16/2017  HX CORONARY ARTERY BYPASS GRAFT  4/25/13  
 x4 vessel, Dr. Paula Taylor  HX HEART CATHETERIZATION  4/24/2012  
 multivessel  HX OTHER SURGICAL Right   
 upper leg hematoma-\"cut it\" Family History Problem Relation Age of Onset  Cancer Father  Cancer Sister  Hypertension Mother  Diabetes Mother  Stroke Mother  Heart Disease Brother MI- had CABG  
 Diabetes Brother  Heart Disease Sister  Hypertension Sister Social History Tobacco Use  Smoking status: Never Smoker  Smokeless tobacco: Never Used Substance Use Topics  Alcohol use: Yes Alcohol/week: 3.5 oz Types: 7 Standard drinks or equivalent per week Prior to Admission medications Medication Sig Start Date End Date Taking? Authorizing Provider  
metoprolol tartrate (LOPRESSOR) 25 mg tablet Take 1 Tab by mouth every twelve (12) hours. 2/8/19   Becky Jones MD  
simvastatin (ZOCOR) 40 mg tablet Take 1 Tab by mouth nightly. 2/8/19   Becky Jones MD  
montelukast (SINGULAIR) 10 mg tablet TAKE ONE TABLET BY MOUTH IN THE MORNING FOR  ASTHMA  PREVENTION. 2/7/19   Becky Jones MD  
albuterol (PROVENTIL HFA, VENTOLIN HFA, PROAIR HFA) 90 mcg/actuation inhaler 2 puffs qid prn 1/24/19   CAROLYNN Bedoya  
LANTUS SOLOSTAR U-100 INSULIN 100 unit/mL (3 mL) inpn INJECT 40 UNITS SUBCUTANEOUSLY EVERY EVENING 1/3/19   Becky Jones MD  
insulin aspart U-100 (NOVOLOG FLEXPEN U-100 INSULIN) 100 unit/mL inpn INJECT UP TO 10 UNITS SUBCUTANEOUSLY 3 TIMES DAILY 1/3/19   Becky Jones MD  
levothyroxine (SYNTHROID) 75 mcg tablet TAKE ONE TABLET BY MOUTH ONCE DAILY BEFORE  BREAKFAST 9/27/18   Becky REY MD  
insulin glargine (LANTUS SOLOSTAR U-100 INSULIN) 100 unit/mL (3 mL) inpn INJECT 40 UNITS SUBCUTANEOUSLY EVERY EVENING 4/2/18   Becky REY MD  
fluticasone-salmeterol (ADVAIR DISKUS) 250-50 mcg/dose diskus inhaler INHALE ONE DOSE BY MOUTH EVERY 12 HOURS 4/2/18   Becky REY MD  
aspirin delayed-release 81 mg tablet Take 81 mg by mouth every morning. Indications: MYOCARDIAL INFARCTION PREVENTION    Provider, Historical  
PRILOSEC OTC PO Take  by mouth every morning.  Indications: for GERD Provider, Historical  
 
No Known Allergies Review of Systems: 
The patient has no difficulty with chest pain or shortness of breath. No fever or chills. No Nausea, vomiting or diarrhea. Comprehensive review of systems was otherwise unremarkable except as noted above. Lab Results Component Value Date/Time Hemoglobin A1c 7.4 (H) 10/07/2014 10:15 AM  
 Hemoglobin A1c (POC) 7.2 (A) 09/27/2018 12:57 PM  
  
 
Immunization History Administered Date(s) Administered  Influenza Vaccine 01/01/2018  Influenza Vaccine (Quad) PF 09/24/2018  Pneumococcal Polysaccharide (PPSV-23) 10/15/2017 Body mass index is 29.41 kg/m². Counseling regarding nutrition done: Yes. Recommend Joaquín nutritional supplement for wound healing. Current medications: 
Current Outpatient Medications Medication Sig Dispense Refill  metoprolol tartrate (LOPRESSOR) 25 mg tablet Take 1 Tab by mouth every twelve (12) hours. 180 Tab 3  
 simvastatin (ZOCOR) 40 mg tablet Take 1 Tab by mouth nightly. 90 Tab 3  
 montelukast (SINGULAIR) 10 mg tablet TAKE ONE TABLET BY MOUTH IN THE MORNING FOR  ASTHMA  PREVENTION. 90 Tab 3  
 albuterol (PROVENTIL HFA, VENTOLIN HFA, PROAIR HFA) 90 mcg/actuation inhaler 2 puffs qid prn 3 Inhaler 3  
 LANTUS SOLOSTAR U-100 INSULIN 100 unit/mL (3 mL) inpn INJECT 40 UNITS SUBCUTANEOUSLY EVERY EVENING 15 Pen 11  
 insulin aspart U-100 (NOVOLOG FLEXPEN U-100 INSULIN) 100 unit/mL inpn INJECT UP TO 10 UNITS SUBCUTANEOUSLY 3 TIMES DAILY 15 Pen 3  
 levothyroxine (SYNTHROID) 75 mcg tablet TAKE ONE TABLET BY MOUTH ONCE DAILY BEFORE  BREAKFAST 90 Tab 3  
 insulin glargine (LANTUS SOLOSTAR U-100 INSULIN) 100 unit/mL (3 mL) inpn INJECT 40 UNITS SUBCUTANEOUSLY EVERY EVENING 20 Pen 2  
 fluticasone-salmeterol (ADVAIR DISKUS) 250-50 mcg/dose diskus inhaler INHALE ONE DOSE BY MOUTH EVERY 12 HOURS 3 Inhaler 3  
 aspirin delayed-release 81 mg tablet Take 81 mg by mouth every morning. Indications: MYOCARDIAL INFARCTION PREVENTION    
 PRILOSEC OTC PO Take  by mouth every morning. Indications: for GERD Objective:  
 
Physical Exam:  
 
Visit Vitals /78 (BP 1 Location: Left arm) Pulse 65 Temp 97.7 °F (36.5 °C) Resp 18 Ht 5' 10\" (1.778 m) Wt 93 kg (205 lb) SpO2 96% BMI 29.41 kg/m² General: well developed, well nourished, pleasant , NAD. Hygiene good Psych: cooperative. Pleasant. No anxiety or depression. Normal mood and affect. HEENT: Normocephalic, atraumatic. EOMI. Conjunctiva clear. No scleral icterus. Neck: Normal range of motion. No masses. Chest: Good air entry bilaterally. Respirations nonlabored Cardio: Normal heart sounds,no rubs, murmurs or gallops Abdomen: Soft, nontender, nondistended, normoactive bowel sounds Vascular: DP and PT pulses are palpable at 2/4 bilateral. Skin temperature is uniform from proximal to distal bilateral. Hair growth is absent bilateral.   
Derm: Nails 1-5 bilateral are within normal limits. No paronychial infections are noted. Skin is atrophic and xerotic. No subcutaneous masses or hyperpigmented lesions are present. Neuro: Epicritic sensation is presentt bilateral. Protective sensation is present with 5.07 SWMF testing to all 10 sites bilateral. Muscle tone and bulk is symmetric bilateral. 
Msk: Muscle strength is 5/5 for all prime movers of the foot bilateral. ROM of all joints is full and fluid without pain. No effusions are palpable. Ulceration to the medial right hallux with granular base. No callusing. There is no bone exposed or palpable. No drainage. No odor. No erythema or edema or heat is present. Improved size. Diabetic Foot Ulcer/Neuropathic Is Wound Greater than 1.0 sq cm ? : No 
Re-Current Wound with Skin Substitue within Last Year : Yes X-Ray in Last 3 Months: No 
PRICILLA In Last 6 Months : No 
Smoking Status: Non- Smoker Wound Free from Infection : Wound Culture Completed Is Wound Free of [place] , and / or Bio-[place]: Yes Malignant Process in Wound : No Biopsy Done Hemoglobin A1Cin Last 3 Months: Yes Type of off Loading: Heel wedge Ulcer Description: Wound Chest Anterior;Mid (Active) Number of days: 2120 Wound Leg Left (Active) Number of days: 2120 Wound Foot Right (Active) Number of days: 1263 Wound Chest Right (Active) Number of days: 489 Wound Toe (specify in comments) Right;Medial (Active) Dressing Status  Clean, dry, and intact 2/13/2019  8:54 AM  
Non-Pressure Injury Full thickness (subcut/muscle) 2/6/2019  9:01 AM  
Wound Length (cm) 0.3 cm 2/13/2019  8:54 AM  
Wound Width (cm) 0.2 cm 2/13/2019  8:54 AM  
Wound Depth (cm) 0.1 2/13/2019  8:54 AM  
Wound Surface area (cm^2) 0.06 cm^2 2/13/2019  8:54 AM  
Change in Wound Size % 98.5 2/13/2019  8:54 AM  
Condition of Base Epithelializing 2/13/2019  8:54 AM  
Condition of Edges Calloused 2/6/2019  9:01 AM  
Epithelialization (%) 90 1/30/2019 10:07 AM  
Tissue Type Pink 2/6/2019  9:01 AM  
Tissue Type Percent Black 5 % 1/2/2019 10:45 AM  
Tissue Type Percent Pink 100 2/6/2019  9:01 AM  
Tissue Type Percent Red 100 1/16/2019 11:11 AM  
Tissue Type Percent Yellow 25 1/2/2019 10:45 AM  
Drainage Amount  None 2/13/2019  8:54 AM  
Drainage Color Serous 2/6/2019  9:01 AM  
Wound Odor None 2/13/2019  8:54 AM  
Periwound Skin Condition Intact 2/13/2019  8:54 AM  
Cleansing and Cleansing Agents  Normal saline 2/13/2019  8:54 AM  
Procedure Tolerated Well 2/6/2019  9:01 AM  
Number of days: 105 [REMOVED] Wound Toe (specify in comments) Left;Dorsal (Removed) Dressing Status  Clean, dry, and intact 11/28/2018  9:33 AM  
Non-Pressure Injury Full thickness (subcut/muscle) 11/21/2018 10:04 AM  
Wound Length (cm) 0 cm 11/28/2018  9:33 AM  
Wound Width (cm) 0 cm 11/28/2018  9:33 AM  
Wound Depth (cm) 0 11/28/2018  9:33 AM  
Wound Surface area (cm^2) 0 cm^2 11/28/2018  9:33 AM  
 Change in Wound Size % 100 11/28/2018  9:33 AM  
Condition of Base [place] 11/28/2018  9:33 AM  
Epithelialization (%) 100 11/28/2018  9:33 AM  
Tissue Type Percent Pink 100 11/28/2018  9:33 AM  
Tissue Type Percent Red 100 11/21/2018 10:04 AM  
Drainage Amount  None 11/28/2018  9:33 AM  
Drainage Color Serosanguinous 11/21/2018 10:04 AM  
Wound Odor None 11/21/2018 10:04 AM  
Cleansing and Cleansing Agents  Normal saline 11/28/2018  9:33 AM  
Procedure Tolerated Well 11/21/2018 10:04 AM  
Number of days: 14 Data Review: No results found for this or any previous visit (from the past 336 hour(s)). PRICILLA studies in 2014: non compressible bilateral 
 
I independently reviewed recent labs, pathology reports, microbiology reports and radiology studies as noted above. Assessment:  
 
80 y.o. male with diabetic ulceration to the medial right hallux and dorsal left 1st MPJ Plan:  
 
Patient was examined and evaluated today. They were educated on the barriers to healing. He is nearly healed. Remain in darco wedge. No golf. With the dry bed and small size acticaot is prescribed every other day. Return in 1 week. Any procedures done today in the wound center are documented in a seperate note in connect care made part of this record by reference. Patient instructed on the following: Follow all wound dressing instructions. Do not get dressing or wound wet. May shower if wound can be effectively kept dry. Cast covers may be purchased at [facility] and [facility]. Should you experience increased redness, swelling, pain, foul odor, size of wound(s), or have a temperature over 101 degrees please contact the [facility] [address] at [phone] or if after hours contact your primary care physician or go to the hospital emergency department. Orders Placed This Encounter  WOUND CARE, DRESSING CHANGE Clean wound with saline. Acticoat Flex 3: cut top approximate size of wound, apply to wound bed. Wrap with rolled gauze. Change every other day. Continue to wear darco wedge shoe. Standing Status:   Standing Number of Occurrences:   1 Follow-up Information Follow up With Specialties Details Why Contact Info [facility] Wound Care In 1 week  [phone] 
[phone] Signed By: Jairo Khan DPM   
 February 13, 2019

## 2019-02-13 NOTE — PROCEDURES
Wound Center Debridement    Patient: Pricila Abraham MRN: [id number]  SSN: [id number]    YOB: 1950  Age: 80 y.o.   Sex: male      February 13, 2019     Procedure Performed By: Angie Arreaga DPM    Wound: 1 Right  Non Pressure  Toe To Fat Layer    Type of Debridement:  Selective      Time Out Taken: Yes    Pain Control: Insensate      Type of Tissue Removed: Biofilm    Frequency of Debridements: PRN    Consent in chart     Instrument: Blade     Bleeding: None     Hemostasis: Pressure     Procedural Pain: Insensate    Post-Procedural Pain: Insensate    Pre-Debridement Measurements: 0.3 x 0.2 x 0.1 cm    Post-Debridement Measurements: 0.3 x 0.2 x 0.1 cm    Surface Area Debrided: 0.06 sq. cm    Response to Procedure: tolerated the procedure well with no complications

## 2019-02-13 NOTE — WOUND CARE
[address] [place] [address], [address] [address] Phone: [phone] Fax: [phone] Patient: Carlos Mayo MRN: [id number]  SSN: [id number] YOB: 1950  Age: 80 y.o. Sex: male Return Appointment: 1 week with Vikki Yang, MAGDALENE 
 
Instructions: Clean wound with saline. Acticoat Flex 3: cut top approximate size of wound, apply to wound bed. Wrap with rolled gauze. Change every other day. Continue to wear darco wedge shoe Should you experience increased redness, swelling, pain, foul odor, size of wound(s), or have a temperature over 101 degrees please contact the [address] [place] [address] Road at [phone] or if after hours contact your primary care physician or go to the hospital emergency department. Signed By: Galilea Johnson RN February 13, 2019

## 2019-02-13 NOTE — WOUND CARE
02/13/19 5436 Wound Toe (specify in comments) Right;Medial  
Date First Assessed: 10/31/18   POA: Yes  Wound Type: Diabetic  Location: Toe (specify in comments)  Wound Description (Optional): Great Toe  Orientation: Right;Medial  
Dressing Status  Clean, dry, and intact Dressing Type  (epifix, mepilex transfer, rolled gauze) Wound Length (cm) 0 cm Wound Width (cm) 0 cm Wound Depth (cm) 0 Wound Surface area (cm^2) 0 cm^2 Change in Wound Size % 100 Condition of Base Epithelializing Drainage Amount  None Wound Odor None Periwound Skin Condition Intact Cleansing and Cleansing Agents  Normal saline Patient is currently taking aspirin.

## 2019-02-20 ENCOUNTER — HOSPITAL ENCOUNTER (OUTPATIENT)
Dept: WOUND CARE | Age: 69
Discharge: HOME | End: 2019-02-20
Attending: PODIATRIST
Payer: COMMERCIAL

## 2019-02-20 VITALS
OXYGEN SATURATION: 98 % | WEIGHT: 204 LBS | SYSTOLIC BLOOD PRESSURE: 159 MMHG | BODY MASS INDEX: 29.2 KG/M2 | DIASTOLIC BLOOD PRESSURE: 84 MMHG | RESPIRATION RATE: 18 BRPM | HEART RATE: 70 BPM | HEIGHT: 70 IN | TEMPERATURE: 97.1 F

## 2019-02-20 PROCEDURE — 99213 OFFICE O/P EST LOW 20 MIN: CPT

## 2019-02-20 NOTE — PROGRESS NOTES
Wound Center Progress Note Patient: Eloy De La Garza MRN: [id number]  SSN: [id number] YOB: 1950  Age: 80 y.o. Sex: male Subjective: Chief Complaint: 
Eloy De La Garza is a 80 y.o. WHITE [place]  male who presents with a wound to the right lower extremity at the medial hallux. He left the dressing dry, clean and intact. 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 History of Present Illness:    
Nature: Painless Location: medial right hallux, dorsal MPJ Duration: October 2018 Onset: Patient states it started as rubbing from boots Course improving Aggravating/Alleviating: Worsened with pressure and dependency, improved with compression and rest 
Treatment: acticoat Past Medical History:  
Diagnosis Date  Asthma   
 controlled, no rescue inhaler; on Dulera 2 x d; singulair  CAD (coronary artery disease)   
 no MI; CABG May 2013  Candida infection  Diabetes ([address] type 2, insulin dependent since age 30; avg fasting 120; A1C last= 6.7 in March '14; hypo @ 70; today (7/31/14) = 304 fasting  Diabetes with ulcer of foot ([address] 9/30/2014  GERD (gastroesophageal reflux disease) daily prilosec  Hypercholesterolemia  Hypertension   
 controlled with med  NSTEMI, initial episode of care [facility]) 4/26/2013  Pleural effusion on right 10/10/2017  S/P CABG x 4 4/25/2013  Thyroid disease   
 hypo- on med Past Surgical History:  
Procedure Laterality Date  CHEST SURGERY PROCEDURE UNLISTED Right 10/12/2017 Right Thoractomy  HX COLONOSCOPY  01/16/2012  
 due date 01/16/2017  HX CORONARY ARTERY BYPASS GRAFT  4/25/13  
 x4 vessel, Dr. Chad Macias  HX HEART CATHETERIZATION  4/24/2012  
 multivessel  HX OTHER SURGICAL Right   
 upper leg hematoma-\"cut it\" Family History Problem Relation Age of Onset  Cancer Father  Cancer Sister  Hypertension Mother  Diabetes Mother  Stroke Mother  Heart Disease Brother MI- had CABG  
 Diabetes Brother  Heart Disease Sister  Hypertension Sister Social History Tobacco Use  Smoking status: Never Smoker  Smokeless tobacco: Never Used Substance Use Topics  Alcohol use: Yes Alcohol/week: 3.5 oz Types: 7 Standard drinks or equivalent per week Prior to Admission medications Medication Sig Start Date End Date Taking? Authorizing Provider  
metoprolol tartrate (LOPRESSOR) 25 mg tablet Take 1 Tab by mouth every twelve (12) hours. 2/8/19   Radu Aly MD  
simvastatin (ZOCOR) 40 mg tablet Take 1 Tab by mouth nightly. 2/8/19   Radu Aly MD  
montelukast (SINGULAIR) 10 mg tablet TAKE ONE TABLET BY MOUTH IN THE MORNING FOR  ASTHMA  PREVENTION. 2/7/19   Radu Aly MD  
albuterol (PROVENTIL HFA, VENTOLIN HFA, PROAIR HFA) 90 mcg/actuation inhaler 2 puffs qid prn 1/24/19   CAROLYNN Saavedra  
LANTUS SOLOSTAR U-100 INSULIN 100 unit/mL (3 mL) inpn INJECT 40 UNITS SUBCUTANEOUSLY EVERY EVENING 1/3/19   Radu Aly MD  
insulin aspart U-100 (NOVOLOG FLEXPEN U-100 INSULIN) 100 unit/mL inpn INJECT UP TO 10 UNITS SUBCUTANEOUSLY 3 TIMES DAILY 1/3/19   Radu Aly MD  
levothyroxine (SYNTHROID) 75 mcg tablet TAKE ONE TABLET BY MOUTH ONCE DAILY BEFORE  BREAKFAST 9/27/18   Radu REY MD  
insulin glargine (LANTUS SOLOSTAR U-100 INSULIN) 100 unit/mL (3 mL) inpn INJECT 40 UNITS SUBCUTANEOUSLY EVERY EVENING 4/2/18   Radu REY MD  
fluticasone-salmeterol (ADVAIR DISKUS) 250-50 mcg/dose diskus inhaler INHALE ONE DOSE BY MOUTH EVERY 12 HOURS 4/2/18   Radu REY MD  
aspirin delayed-release 81 mg tablet Take 81 mg by mouth every morning. Indications: MYOCARDIAL INFARCTION PREVENTION    Provider, Historical  
PRILOSEC OTC PO Take  by mouth every morning.  Indications: for GERD Provider, Historical  
 
No Known Allergies Review of Systems: 
The patient has no difficulty with chest pain or shortness of breath. No fever or chills. No Nausea, vomiting or diarrhea. Comprehensive review of systems was otherwise unremarkable except as noted above. Lab Results Component Value Date/Time Hemoglobin A1c 7.4 (H) 10/07/2014 10:15 AM  
 Hemoglobin A1c (POC) 7.2 (A) 09/27/2018 12:57 PM  
  
 
Immunization History Administered Date(s) Administered  Influenza Vaccine 01/01/2018  Influenza Vaccine (Quad) PF 09/24/2018  Pneumococcal Polysaccharide (PPSV-23) 10/15/2017 Body mass index is 29.27 kg/m². Counseling regarding nutrition done: Yes. Recommend Joaquín nutritional supplement for wound healing. Current medications: 
Current Outpatient Medications Medication Sig Dispense Refill  metoprolol tartrate (LOPRESSOR) 25 mg tablet Take 1 Tab by mouth every twelve (12) hours. 180 Tab 3  
 simvastatin (ZOCOR) 40 mg tablet Take 1 Tab by mouth nightly. 90 Tab 3  
 montelukast (SINGULAIR) 10 mg tablet TAKE ONE TABLET BY MOUTH IN THE MORNING FOR  ASTHMA  PREVENTION. 90 Tab 3  
 albuterol (PROVENTIL HFA, VENTOLIN HFA, PROAIR HFA) 90 mcg/actuation inhaler 2 puffs qid prn 3 Inhaler 3  
 LANTUS SOLOSTAR U-100 INSULIN 100 unit/mL (3 mL) inpn INJECT 40 UNITS SUBCUTANEOUSLY EVERY EVENING 15 Pen 11  
 insulin aspart U-100 (NOVOLOG FLEXPEN U-100 INSULIN) 100 unit/mL inpn INJECT UP TO 10 UNITS SUBCUTANEOUSLY 3 TIMES DAILY 15 Pen 3  
 levothyroxine (SYNTHROID) 75 mcg tablet TAKE ONE TABLET BY MOUTH ONCE DAILY BEFORE  BREAKFAST 90 Tab 3  
 insulin glargine (LANTUS SOLOSTAR U-100 INSULIN) 100 unit/mL (3 mL) inpn INJECT 40 UNITS SUBCUTANEOUSLY EVERY EVENING 20 Pen 2  
 fluticasone-salmeterol (ADVAIR DISKUS) 250-50 mcg/dose diskus inhaler INHALE ONE DOSE BY MOUTH EVERY 12 HOURS 3 Inhaler 3  
 aspirin delayed-release 81 mg tablet Take 81 mg by mouth every morning. Indications: MYOCARDIAL INFARCTION PREVENTION    
 PRILOSEC OTC PO Take  by mouth every morning. Indications: for GERD Objective:  
 
Physical Exam:  
 
Visit Vitals /84 (BP 1 Location: Right arm) Pulse 70 Temp 97.1 °F (36.2 °C) Resp 18 Ht 5' 10\" (1.778 m) Wt 92.5 kg (204 lb) SpO2 98% BMI 29.27 kg/m² General: well developed, well nourished, pleasant , NAD. Hygiene good Psych: cooperative. Pleasant. No anxiety or depression. Normal mood and affect. HEENT: Normocephalic, atraumatic. EOMI. Conjunctiva clear. No scleral icterus. Neck: Normal range of motion. No masses. Chest: Good air entry bilaterally. Respirations nonlabored Cardio: Normal heart sounds,no rubs, murmurs or gallops Abdomen: Soft, nontender, nondistended, normoactive bowel sounds Vascular: DP and PT pulses are palpable at 2/4 bilateral. Skin temperature is uniform from proximal to distal bilateral. Hair growth is absent bilateral.   
Derm: Nails 1-5 bilateral are within normal limits. No paronychial infections are noted. Skin is atrophic and xerotic. No subcutaneous masses or hyperpigmented lesions are present. Neuro: Epicritic sensation is presentt bilateral. Protective sensation is present with 5.07 SWMF testing to all 10 sites bilateral. Muscle tone and bulk is symmetric bilateral. 
Msk: Muscle strength is 5/5 for all prime movers of the foot bilateral. ROM of all joints is full and fluid without pain. No effusions are palpable. Ulceration to the medial right hallux with granular base. No callusing. There is no bone exposed or palpable. No drainage. No odor. No erythema or edema or heat is present. Improved size. Diabetic Foot Ulcer/Neuropathic Is Wound Greater than 1.0 sq cm ? : No 
Re-Current Wound with Skin Substitue within Last Year : Yes X-Ray in Last 3 Months: No 
PRICILLA In Last 6 Months : No(patient refuses) Smoking Status: Non- Smoker Wound Free from Infection : Wound Culture Completed Is Wound Free of [place] , and / or [place] [place]: Yes Malignant Process in Wound : No Biopsy Done Type of off Loading: Heel wedge Ulcer Description: Wound Chest Anterior;Mid (Active) Number of days: 2127 Wound Leg Left (Active) Number of days: 2127 Wound Foot Right (Active) Number of days: 0910 Wound Chest Right (Active) Number of days: 496 Wound Toe (specify in comments) Right;Medial (Active) Dressing Status  Clean, dry, and intact 2/20/2019  8:55 AM  
Non-Pressure Injury Full thickness (subcut/muscle) 2/6/2019  9:01 AM  
Wound Length (cm) 0.1 cm 2/20/2019  8:55 AM  
Wound Width (cm) 0.1 cm 2/20/2019  8:55 AM  
Wound Depth (cm) 0.1 2/20/2019  8:55 AM  
Wound Surface area (cm^2) 0.01 cm^2 2/20/2019  8:55 AM  
Change in Wound Size % 99.75 2/20/2019  8:55 AM  
Condition of Base [place] 2/20/2019  8:55 AM  
Condition of Edges Calloused 2/6/2019  9:01 AM  
Epithelialization (%) 90 1/30/2019 10:07 AM  
Tissue Type Pink 2/6/2019  9:01 AM  
Tissue Type Percent Black 5 % 1/2/2019 10:45 AM  
Tissue Type Percent Pink 100 2/20/2019  8:55 AM  
Tissue Type Percent Red 100 1/16/2019 11:11 AM  
Tissue Type Percent Yellow 25 1/2/2019 10:45 AM  
Drainage Amount  None 2/20/2019  8:55 AM  
Drainage Color Serous 2/6/2019  9:01 AM  
Wound Odor None 2/20/2019  8:55 AM  
Periwound Skin Condition Intact 2/20/2019  8:55 AM  
Cleansing and Cleansing Agents  Normal saline 2/20/2019  8:55 AM  
Procedure Tolerated Well 2/6/2019  9:01 AM  
Number of days: 112 [REMOVED] Wound Toe (specify in comments) Left;Dorsal (Removed) Dressing Status  Clean, dry, and intact 11/28/2018  9:33 AM  
Non-Pressure Injury Full thickness (subcut/muscle) 11/21/2018 10:04 AM  
Wound Length (cm) 0 cm 11/28/2018  9:33 AM  
Wound Width (cm) 0 cm 11/28/2018  9:33 AM  
Wound Depth (cm) 0 11/28/2018  9:33 AM  
Wound Surface area (cm^2) 0 cm^2 11/28/2018  9:33 AM  
 Change in Wound Size % 100 11/28/2018  9:33 AM  
Condition of Base [place] 11/28/2018  9:33 AM  
Epithelialization (%) 100 11/28/2018  9:33 AM  
Tissue Type Percent Pink 100 11/28/2018  9:33 AM  
Tissue Type Percent Red 100 11/21/2018 10:04 AM  
Drainage Amount  None 11/28/2018  9:33 AM  
Drainage Color Serosanguinous 11/21/2018 10:04 AM  
Wound Odor None 11/21/2018 10:04 AM  
Cleansing and Cleansing Agents  Normal saline 11/28/2018  9:33 AM  
Procedure Tolerated Well 11/21/2018 10:04 AM  
Number of days: 14 Data Review: No results found for this or any previous visit (from the past 336 hour(s)). PRICILLA studies in 2014: non compressible bilateral 
 
I independently reviewed recent labs, pathology reports, microbiology reports and radiology studies as noted above. Assessment:  
 
80 y.o. male with diabetic ulceration to the medial right hallux and dorsal left 1st MPJ Plan:  
 
Patient was examined and evaluated today. They were educated on the barriers to healing. He has immature epithelium to the base. He may begin regular hygiene - remove dressing, wash the foot and then reapply acticaot to the wound. He may return to his regular shoe. Return in 3 weeks for skin check. Any procedures done today in the wound center are documented in a seperate note in connect care made part of this record by reference. Patient instructed on the following: Follow all wound dressing instructions. Do not get dressing or wound wet. May shower if wound can be effectively kept dry. Cast covers may be purchased at [facility] in [place]. Should you experience increased redness, swelling, pain, foul odor, size of wound(s), or have a temperature over 101 degrees please contact the [address] [place] [address] Road at [phone] or if after hours contact your primary care physician or go to the hospital emergency department. Orders Placed This Encounter  WOUND CARE, DRESSING CHANGE  
 Clean wound with saline. Acticoat Flex 3: cut top approximate size of wound, apply to wound bed. Wrap with rolled gauze. Change every other day. May shower. May wear regular shoes. Standing Status:   Standing Number of Occurrences:   1 Follow-up Information Follow up With Specialties Details Why Contact Info [facility] Wound Care In 3 weeks  [phone] 
[phone] Signed By: Adali Brock DPM   
 February 20, 2019

## 2019-02-20 NOTE — WOUND CARE
[address] [place] [address] [place], [address] [address] Phone: [phone] Fax: [phone] Patient: Joe Mitchell MRN: [id number]  SSN: [id number] YOB: 1950  Age: 80 y.o. Sex: male Return Appointment: 3 weeks with Lord Staci DPM 
 
Instructions: Clean wound with saline. Acticoat Flex 3: cut top approximate size of wound, apply to wound bed. Wrap with rolled gauze. Change every other day. May shower. May wear regular shoes. Should you experience increased redness, swelling, pain, foul odor, size of wound(s), or have a temperature over 101 degrees please contact the [address] [place] [address] Road at [phone] or if after hours contact your primary care physician or go to the hospital emergency department. Signed By: Adalgisa Delcid RN February 20, 2019

## 2019-02-20 NOTE — WOUND CARE
02/20/19 7534 Wound Toe (specify in comments) Right;Medial  
Date First Assessed: 10/31/18   POA: Yes  Wound Type: Diabetic  Location: Toe (specify in comments)  Wound Description (Optional): Great Toe  Orientation: Right;Medial  
Dressing Status  Clean, dry, and intact Dressing Type  (acticoat, 2x2, rolled gauze) Wound Length (cm) 0.1 cm Wound Width (cm) 0.1 cm Wound Depth (cm) 0.1 Wound Surface area (cm^2) 0.01 cm^2 Change in Wound Size % 99.75 Condition of Base Pink Tissue Type Percent Pink 100 Drainage Amount  None Wound Odor None Periwound Skin Condition Intact Cleansing and Cleansing Agents  Normal saline Patient is currently taking ASA daily

## 2019-03-13 ENCOUNTER — HOSPITAL ENCOUNTER (OUTPATIENT)
Dept: WOUND CARE | Age: 69
End: 2019-03-13
Attending: PODIATRIST

## 2019-07-30 PROBLEM — R05.8 PRODUCTIVE COUGH: Status: ACTIVE | Noted: 2019-07-30

## 2019-07-31 ENCOUNTER — HOSPITAL ENCOUNTER (OUTPATIENT)
Dept: LAB | Age: 69
Discharge: HOME | End: 2019-07-31
Payer: COMMERCIAL

## 2019-07-31 PROCEDURE — 87186 SC STD MICRODIL/AGAR DIL: CPT

## 2019-07-31 PROCEDURE — 87070 CULTURE OTHR SPECIMN AEROBIC: CPT

## 2019-07-31 PROCEDURE — 87077 CULTURE AEROBIC IDENTIFY: CPT

## 2019-08-03 LAB
BACTERIA SPEC CULT: ABNORMAL
BACTERIA SPEC CULT: ABNORMAL
GRAM STN SPEC: ABNORMAL
SERVICE CMNT-IMP: ABNORMAL

## 2019-08-06 NOTE — PROGRESS NOTES
Spoke with the patient in regards to their Lab results, explained to the patient per Dr. Braulio Hurtado that the culture showed a bacteria which appears resistant to the Levaquin that was previously given and that we would like for him take a different antibiotic called Vantin. Patient understood and is willing to try this antibiotic. Patient did not have any further questions or concerns at this time. Prescription for Melda Eis will be e-scribed to [facility] // Bianca CUNNINGHAM

## 2019-08-06 NOTE — PROGRESS NOTES
Sputum culture showed a bacteria which appears resistant to the levaquin previously given. Would recommend Vantin 200mg BID x 7 days.     Sine

## 2019-08-14 PROBLEM — Z95.1 S/P CABG X 4: Status: ACTIVE | Noted: 2019-08-14

## 2019-08-14 PROBLEM — E78.2 MIXED HYPERLIPIDEMIA: Status: ACTIVE | Noted: 2019-08-14

## 2019-08-14 PROBLEM — J45.20 MILD INTERMITTENT ASTHMA WITHOUT COMPLICATION: Status: ACTIVE | Noted: 2019-08-14

## 2019-08-14 PROBLEM — E11.9 TYPE 2 DIABETES MELLITUS WITHOUT COMPLICATION (HCC): Status: ACTIVE | Noted: 2019-08-14

## 2019-09-05 ENCOUNTER — HOSPITAL ENCOUNTER (OUTPATIENT)
Dept: GENERAL RADIOLOGY | Age: 69
Discharge: HOME | End: 2019-09-05
Payer: COMMERCIAL

## 2019-09-05 DIAGNOSIS — R05.8 PRODUCTIVE COUGH: ICD-10-CM

## 2019-09-05 DIAGNOSIS — J45.40 MODERATE PERSISTENT ASTHMA WITHOUT COMPLICATION: Chronic | ICD-10-CM

## 2019-09-05 PROCEDURE — 71046 X-RAY EXAM CHEST 2 VIEWS: CPT

## 2019-09-05 NOTE — PROGRESS NOTES
Left patient message via voicemail to please give me a call as soon as they are available. // Rukhsana LANDON.

## 2019-09-05 NOTE — PROGRESS NOTES
He has follow up set up with me on Sep 24th. In preparation and given his reported increased coughing symptoms I wanted to get some additional evaluation. His CXR shows some increased infiltrates and I want to get his sputum cultures repeated with respiratory and AFB. He also is due for his blood tests I previously ordered for him (IgE, CBC, RAST and pneumococcal antibodies). I would like him to get a nebulizer and use albuterol and 3% saline twice daily for bronchiectasis with recurrent exacerbations. I will discuss them with him further in follow up. I would offer him 10 days of Levaquin 750mg daily after he submits his sputums. After he follows up I may repeat a CT chest to look at these infiltrates and airways.      Sine

## 2019-09-09 NOTE — PROGRESS NOTES
Spoke with the patient in regards to their CXR results, explained to the patient per Dr. Everett Barrett that in preparation of his upcoming appointment on 09/24 we would like for him to follow through with his labs and sputum test.  Patient is willing to get the blood work done but is having trouble getting the sputum up but is still willing to try. I also explained that we would like to set him up with a nebulizer with Albuterol and 3% Saline, patient stated that he currently has a nebulizer but is willing to do the medications. I also discussed that we would like to offer him Levaquin once he submits his sputum test and has been explained that once he get's this test completed to call us so we can prescribe the Levaquin. Patient understood results and did not have any further questions or concerns at this time. Albuterol and 3% Saline will be e-scribed to [facility] // [employer].

## 2019-09-10 ENCOUNTER — HOSPITAL ENCOUNTER (OUTPATIENT)
Dept: LAB | Age: 69
Discharge: HOME | End: 2019-09-10
Payer: COMMERCIAL

## 2019-09-10 DIAGNOSIS — J45.40 MODERATE PERSISTENT ASTHMA WITHOUT COMPLICATION: ICD-10-CM

## 2019-09-10 LAB
BASOPHILS # BLD: 0 K/UL (ref 0–0.2)
BASOPHILS NFR BLD: 0 % (ref 0–2)
DIFFERENTIAL METHOD BLD: ABNORMAL
EOSINOPHIL # BLD: 0 K/UL (ref 0–0.8)
EOSINOPHIL NFR BLD: 0 % (ref 0.5–7.8)
ERYTHROCYTE [DISTWIDTH] IN BLOOD BY AUTOMATED COUNT: 12.2 % (ref 11.9–14.6)
HCT VFR BLD AUTO: 44 % (ref 41.1–50.3)
HGB BLD-MCNC: 14.9 G/DL (ref 13.6–17.2)
IMM GRANULOCYTES # BLD AUTO: 0.1 K/UL (ref 0–0.5)
IMM GRANULOCYTES NFR BLD AUTO: 1 % (ref 0–5)
LYMPHOCYTES # BLD: 0.8 K/UL (ref 0.5–4.6)
LYMPHOCYTES NFR BLD: 6 % (ref 13–44)
MCH RBC QN AUTO: 33.3 PG (ref 26.1–32.9)
MCHC RBC AUTO-ENTMCNC: 33.9 G/DL (ref 31.4–35)
MCV RBC AUTO: 98.4 FL (ref 79.6–97.8)
MONOCYTES # BLD: 0.4 K/UL (ref 0.1–1.3)
MONOCYTES NFR BLD: 3 % (ref 4–12)
NEUTS SEG # BLD: 12.4 K/UL (ref 1.7–8.2)
NEUTS SEG NFR BLD: 91 % (ref 43–78)
NRBC # BLD: 0 K/UL (ref 0–0.2)
PLATELET # BLD AUTO: 249 K/UL (ref 150–450)
PMV BLD AUTO: 10.1 FL (ref 9.4–12.3)
RBC # BLD AUTO: 4.47 M/UL (ref 4.23–5.6)
WBC # BLD AUTO: 13.7 K/UL (ref 4.3–11.1)

## 2019-09-10 PROCEDURE — 87186 SC STD MICRODIL/AGAR DIL: CPT

## 2019-09-10 PROCEDURE — 85025 COMPLETE CBC W/AUTO DIFF WBC: CPT

## 2019-09-10 PROCEDURE — 36415 COLL VENOUS BLD VENIPUNCTURE: CPT

## 2019-09-10 PROCEDURE — 87077 CULTURE AEROBIC IDENTIFY: CPT

## 2019-09-10 PROCEDURE — 87149 DNA/RNA DIRECT PROBE: CPT

## 2019-09-10 PROCEDURE — 87102 FUNGUS ISOLATION CULTURE: CPT

## 2019-09-10 PROCEDURE — 87116 MYCOBACTERIA CULTURE: CPT

## 2019-09-10 PROCEDURE — 86317 IMMUNOASSAY INFECTIOUS AGENT: CPT

## 2019-09-10 PROCEDURE — 82785 ASSAY OF IGE: CPT

## 2019-09-10 PROCEDURE — 87070 CULTURE OTHR SPECIMN AEROBIC: CPT

## 2019-09-12 ENCOUNTER — APPOINTMENT (RX ONLY)
Dept: URBAN - METROPOLITAN AREA CLINIC 349 | Facility: CLINIC | Age: 69
Setting detail: DERMATOLOGY
End: 2019-09-12

## 2019-09-12 DIAGNOSIS — D22 MELANOCYTIC NEVI: ICD-10-CM

## 2019-09-12 DIAGNOSIS — L57.0 ACTINIC KERATOSIS: ICD-10-CM

## 2019-09-12 DIAGNOSIS — L82.1 OTHER SEBORRHEIC KERATOSIS: ICD-10-CM

## 2019-09-12 PROBLEM — D22.5 MELANOCYTIC NEVI OF TRUNK: Status: ACTIVE | Noted: 2019-09-12

## 2019-09-12 PROCEDURE — 17003 DESTRUCT PREMALG LES 2-14: CPT

## 2019-09-12 PROCEDURE — ? LIQUID NITROGEN

## 2019-09-12 PROCEDURE — 99213 OFFICE O/P EST LOW 20 MIN: CPT | Mod: 25

## 2019-09-12 PROCEDURE — ? OTHER

## 2019-09-12 PROCEDURE — ? COUNSELING

## 2019-09-12 PROCEDURE — 17000 DESTRUCT PREMALG LESION: CPT

## 2019-09-12 ASSESSMENT — LOCATION DETAILED DESCRIPTION DERM
LOCATION DETAILED: LEFT MEDIAL FRONTAL SCALP
LOCATION DETAILED: RIGHT DISTAL DORSAL FOREARM
LOCATION DETAILED: RIGHT PROXIMAL RADIAL DORSAL FOREARM
LOCATION DETAILED: RIGHT DISTAL RADIAL DORSAL FOREARM
LOCATION DETAILED: RIGHT RADIAL DORSAL HAND
LOCATION DETAILED: RIGHT PROXIMAL DORSAL FOREARM
LOCATION DETAILED: RIGHT MEDIAL ZYGOMA
LOCATION DETAILED: LEFT DISTAL RADIAL DORSAL FOREARM
LOCATION DETAILED: 3RD WEB SPACE RIGHT HAND
LOCATION DETAILED: SUPERIOR THORACIC SPINE

## 2019-09-12 ASSESSMENT — LOCATION SIMPLE DESCRIPTION DERM
LOCATION SIMPLE: RIGHT ZYGOMA
LOCATION SIMPLE: LEFT SCALP
LOCATION SIMPLE: UPPER BACK
LOCATION SIMPLE: RIGHT FOREARM
LOCATION SIMPLE: LEFT FOREARM
LOCATION SIMPLE: RIGHT HAND

## 2019-09-12 ASSESSMENT — LOCATION ZONE DERM
LOCATION ZONE: ARM
LOCATION ZONE: HAND
LOCATION ZONE: FACE
LOCATION ZONE: TRUNK
LOCATION ZONE: SCALP

## 2019-09-12 ASSESSMENT — PAIN INTENSITY VAS: HOW INTENSE IS YOUR PAIN 0 BEING NO PAIN, 10 BEING THE MOST SEVERE PAIN POSSIBLE?: 1/10 PAIN

## 2019-09-12 NOTE — PROCEDURE: LIQUID NITROGEN
Number Of Freeze-Thaw Cycles: 2 freeze-thaw cycles
Render Note In Bullet Format When Appropriate: No
Duration Of Freeze Thaw-Cycle (Seconds): 3
Detail Level: Detailed
Consent: The patient's consent was obtained including but not limited to risks of crusting, scabbing, blistering, scarring, darker or lighter pigmentary change, recurrence, incomplete removal and infection.
Post-Care Instructions: I reviewed with the patient in detail post-care instructions. Patient is to wear sunprotection, and avoid picking at any of the treated lesions. Pt may apply Vaseline to crusted or scabbing areas.

## 2019-09-13 NOTE — PROGRESS NOTES
Looks like is going to grow E. Coli again. Given bronchiectasis on CT I would like to offer him 2 weeks of Vantin 200mg BID PO. I also want him to get 3% saline and use 4mL BID with his nebulizer for airway clearance. He can keep his follow up with me in 10-11 days I think.     Sine

## 2019-09-13 NOTE — PROGRESS NOTES
Left patient message via voicemail to please give me a call as soon as they are available. // Kalie CUNNINGHAM

## 2019-09-13 NOTE — PROGRESS NOTES
Spoke with the patient in regards to their Lab results, explained to the patient per Dr. Clem Crooks that it looks like the [place] has grown again and given the bronchiectasis on CT we would like to offer him 2 weeks of Vantin 200mg and also get him 3% of saline to use bid. I also explained that we would like for him to keep his follow up with us in 10-11 days. Patient understood results and did not have any further questions or concerns at this time. Patient states that he currently has a prescription for Saline and Vantin will be e-scribed to [employer] // Emelia CUNNINGHAM

## 2019-09-14 LAB
IGE SERPL-ACNC: 117 IU/ML (ref 6–495)
S PNEUM DA 18C IGG SER IA-MCNC: 0.4 UG/ML
S PNEUM DA 19A IGG SER IA-MCNC: 6.5 UG/ML
S PNEUM DA 6B IGG SER IA-MCNC: 0.9 UG/ML
S PNEUM DA 7F IGG SER IA-MCNC: 1.7 UG/ML
S PNEUM DA 9V IGG SER IA-MCNC: 0.5 UG/ML
STREP PNEUMO TYPE 1, 139091: 0.6 UG/ML
STREP PNEUMO TYPE 12, 139096: 0.3 UG/ML
STREP PNEUMO TYPE 14, 139097: 12 UG/ML
STREP PNEUMO TYPE 19, 139098: 10.7 UG/ML
STREP PNEUMO TYPE 23, 139099: 0.2 UG/ML
STREP PNEUMO TYPE 3, 139092: 1.2 UG/ML
STREP PNEUMO TYPE 4, 139093: 0.3 UG/ML
STREP PNEUMO TYPE 8, 139094: 2.7 UG/ML
STREP PNEUMO TYPE 9, 139095: 0.5 UG/ML

## 2019-09-17 NOTE — PROGRESS NOTES
Unfortunately, the bacteria recovered this time was different than last time. I need to switch his antibiotics to Levaquin 750mg and would continue for 2 weeks. I would also like to refer him to allergy as I don't think his response to pneumococcal vaccine was adequate and want to see if an immune deficiency is contributing to his recurrent bronchiectasis exacerbations and potentially as a cause of bronchiectasis. He should still continue nebulizer with 3% saline and albuterol and follow up with me as planned.      Sine

## 2019-09-18 NOTE — PROGRESS NOTES
Spoke with the patient in regards to their Lab results, explained to the patient per Dr. Olga Lidia Allen that the bacteria recovered this time was different than the last time. I also explained that we need to switch his antibiotics to Levaquin 750 mg and would continue for 2 weeks and refer him to allergy as we don't think his response to the pneumonia vaccine was adequate and want to see if an immune deficiency is contributing to his recurrent bronchiectasis exacerbations and potentially as a cause of bronchiectasis and that he should still continue his nebulizer with 3% saline and albuterol and follow with Dr. Olga Lidia Allen as planned. Patient understood results and did not have any further questions or concerns at this time. // Mao CUNNINGHAM

## 2019-09-19 LAB
ANTIMICROBIAL SUSCEPTIBILITY, 080575: ABNORMAL
BACTERIA ISLT: NORMAL
RESULT 1, 080571: ABNORMAL
SPECIMEN SOURCE: NORMAL

## 2019-09-22 LAB
BACTERIA SPEC CULT: ABNORMAL
GRAM STN SPEC: ABNORMAL
SERVICE CMNT-IMP: ABNORMAL

## 2019-09-24 PROBLEM — J47.0 BRONCHIECTASIS WITH ACUTE LOWER RESPIRATORY INFECTION (HCC): Status: ACTIVE | Noted: 2019-08-14

## 2019-10-09 LAB
FUNGUS CULTURE, RFCO2T: NORMAL
FUNGUS SMEAR, RFCO1T: NORMAL
FUNGUS SPEC CULT: NORMAL
FUNGUS STAIN, 188244: NORMAL
REFLEX TO ID, RFCO3T: NORMAL
SPECIMEN SOURCE: NORMAL
SPECIMEN SOURCE: NORMAL

## 2019-10-16 LAB
ACID FAST STN SPEC: NEGATIVE
AMIKACIN ISLT MIC: ABNORMAL
CIPROFLOXACIN ISLT MIC: ABNORMAL
CLARITHRO ISLT MIC: ABNORMAL
ETHAMBUTOL ISLT MIC: ABNORMAL
LINEZOLID ISLT MIC: ABNORMAL
M AVIUM CMPLX RRNA SPEC QL PROBE: POSITIVE
M GORDONAE RRNA SPEC QL PROBE: ABNORMAL
M KANSASII RRNA SPEC QL PROBE: ABNORMAL
M TB CMPLX RRNA SPEC QL PROBE: NEGATIVE
MICROORGANISM/AGENT SPEC: ABNORMAL
MOXIFLOXACIN ISLT MIC: ABNORMAL
MYCOBACTERIUM SPEC QL CULT: POSITIVE
OTHER, RAFBI6: ABNORMAL
PLEASE NOTE, AFR16: ABNORMAL
RIFAMPIN ISLT MIC: ABNORMAL
SPECIMEN PREPARATION: ABNORMAL
SPECIMEN SOURCE: ABNORMAL
STREPTOMYCIN ISLT MIC: ABNORMAL
SUSCEPT TESTING, RAFBI7: ABNORMAL

## 2019-10-30 PROCEDURE — 87116 MYCOBACTERIA CULTURE: CPT

## 2019-10-31 ENCOUNTER — HOSPITAL ENCOUNTER (OUTPATIENT)
Dept: LAB | Age: 69
Discharge: HOME | End: 2019-10-31
Payer: COMMERCIAL

## 2019-12-03 PROBLEM — J47.9 BRONCHIECTASIS WITHOUT COMPLICATION (HCC): Status: ACTIVE | Noted: 2019-08-14

## 2019-12-23 LAB
ACID FAST STN SPEC: NEGATIVE
MYCOBACTERIUM SPEC QL CULT: NEGATIVE
SPECIMEN PREPARATION: NORMAL
SPECIMEN SOURCE: NORMAL

## 2020-02-21 ENCOUNTER — APPOINTMENT (RX ONLY)
Dept: URBAN - METROPOLITAN AREA CLINIC 349 | Facility: CLINIC | Age: 70
Setting detail: DERMATOLOGY
End: 2020-02-21

## 2020-02-21 DIAGNOSIS — Z85.828 PERSONAL HISTORY OF OTHER MALIGNANT NEOPLASM OF SKIN: ICD-10-CM

## 2020-02-21 DIAGNOSIS — L57.0 ACTINIC KERATOSIS: ICD-10-CM

## 2020-02-21 PROCEDURE — ? PHOTO-DOCUMENTATION

## 2020-02-21 PROCEDURE — ? OBSERVATION

## 2020-02-21 PROCEDURE — 17003 DESTRUCT PREMALG LES 2-14: CPT

## 2020-02-21 PROCEDURE — ? COUNSELING

## 2020-02-21 PROCEDURE — ? LIQUID NITROGEN

## 2020-02-21 PROCEDURE — 99213 OFFICE O/P EST LOW 20 MIN: CPT | Mod: 25

## 2020-02-21 PROCEDURE — 17000 DESTRUCT PREMALG LESION: CPT

## 2020-02-21 ASSESSMENT — LOCATION SIMPLE DESCRIPTION DERM
LOCATION SIMPLE: LEFT RING FINGER
LOCATION SIMPLE: LEFT HAND
LOCATION SIMPLE: LEFT FOREARM
LOCATION SIMPLE: RIGHT HAND

## 2020-02-21 ASSESSMENT — LOCATION DETAILED DESCRIPTION DERM
LOCATION DETAILED: LEFT DISTAL DORSAL FOREARM
LOCATION DETAILED: RIGHT RADIAL DORSAL HAND
LOCATION DETAILED: 3RD WEB SPACE LEFT HAND
LOCATION DETAILED: 3RD WEB SPACE RIGHT HAND
LOCATION DETAILED: LEFT MID RADIAL DORSAL RING FINGER

## 2020-02-21 ASSESSMENT — PAIN INTENSITY VAS: HOW INTENSE IS YOUR PAIN 0 BEING NO PAIN, 10 BEING THE MOST SEVERE PAIN POSSIBLE?: 1/10 PAIN

## 2020-02-21 ASSESSMENT — LOCATION ZONE DERM
LOCATION ZONE: FINGER
LOCATION ZONE: HAND
LOCATION ZONE: ARM

## 2020-02-21 NOTE — PROCEDURE: LIQUID NITROGEN
Consent: The patient's consent was obtained including but not limited to risks of crusting, scabbing, blistering, scarring, darker or lighter pigmentary change, recurrence, incomplete removal and infection.
Detail Level: Detailed
Post-Care Instructions: I reviewed with the patient in detail post-care instructions. Patient is to wear sunprotection, and avoid picking at any of the treated lesions. Pt may apply Vaseline to crusted or scabbing areas.
Render Post-Care Instructions In Note?: no
Duration Of Freeze Thaw-Cycle (Seconds): 3
Number Of Freeze-Thaw Cycles: 2 freeze-thaw cycles

## 2020-03-12 PROBLEM — Z47.89 AFTERCARE FOLLOWING OTHER SURGERY OF MUSCULOSKELETAL SYSTEM: Status: ACTIVE | Noted: 2020-03-12

## 2020-03-12 PROBLEM — D80.9 IMMUNODEFICIENCY WITH PREDOMINANTLY ANTIBODY DEFECTS, UNSPECIFIED (HCC): Status: ACTIVE | Noted: 2020-03-12

## 2020-03-12 PROBLEM — M79.609 PAIN IN SOFT TISSUES OF LIMB: Status: ACTIVE | Noted: 2020-03-12

## 2020-03-12 PROBLEM — B99.9 INFECTIOUS DISEASE: Status: ACTIVE | Noted: 2020-03-12

## 2020-03-12 PROBLEM — L97.509 ULCER OF OTHER PART OF FOOT: Status: ACTIVE | Noted: 2020-03-12

## 2020-03-12 PROBLEM — J30.9 ALLERGIC RHINITIS: Status: ACTIVE | Noted: 2020-03-12

## 2020-08-19 PROBLEM — I10 ESSENTIAL HYPERTENSION: Status: ACTIVE | Noted: 2020-08-19

## 2020-09-23 ENCOUNTER — APPOINTMENT (RX ONLY)
Dept: URBAN - METROPOLITAN AREA CLINIC 349 | Facility: CLINIC | Age: 70
Setting detail: DERMATOLOGY
End: 2020-09-23

## 2020-09-23 DIAGNOSIS — L57.0 ACTINIC KERATOSIS: ICD-10-CM

## 2020-09-23 DIAGNOSIS — Z85.828 PERSONAL HISTORY OF OTHER MALIGNANT NEOPLASM OF SKIN: ICD-10-CM

## 2020-09-23 DIAGNOSIS — D22 MELANOCYTIC NEVI: ICD-10-CM | Status: STABLE

## 2020-09-23 PROBLEM — D04.62 CARCINOMA IN SITU OF SKIN OF LEFT UPPER LIMB, INCLUDING SHOULDER: Status: ACTIVE | Noted: 2020-09-23

## 2020-09-23 PROBLEM — D48.5 NEOPLASM OF UNCERTAIN BEHAVIOR OF SKIN: Status: ACTIVE | Noted: 2020-09-23

## 2020-09-23 PROBLEM — D22.5 MELANOCYTIC NEVI OF TRUNK: Status: ACTIVE | Noted: 2020-09-23

## 2020-09-23 PROCEDURE — 17000 DESTRUCT PREMALG LESION: CPT | Mod: 59

## 2020-09-23 PROCEDURE — A4550 SURGICAL TRAYS: HCPCS

## 2020-09-23 PROCEDURE — ? COUNSELING

## 2020-09-23 PROCEDURE — 99214 OFFICE O/P EST MOD 30 MIN: CPT | Mod: 25

## 2020-09-23 PROCEDURE — ? LIQUID NITROGEN

## 2020-09-23 PROCEDURE — 17003 DESTRUCT PREMALG LES 2-14: CPT

## 2020-09-23 PROCEDURE — 11102 TANGNTL BX SKIN SINGLE LES: CPT

## 2020-09-23 PROCEDURE — ? BIOPSY BY SHAVE METHOD

## 2020-09-23 PROCEDURE — ? MEDICAL PHOTOGRAPHY REVIEW

## 2020-09-23 PROCEDURE — ? PATHOLOGY BILLING

## 2020-09-23 PROCEDURE — 88305 TISSUE EXAM BY PATHOLOGIST: CPT

## 2020-09-23 ASSESSMENT — LOCATION SIMPLE DESCRIPTION DERM
LOCATION SIMPLE: RIGHT UPPER BACK
LOCATION SIMPLE: RIGHT FOREARM
LOCATION SIMPLE: LEFT EAR
LOCATION SIMPLE: RIGHT HAND
LOCATION SIMPLE: UPPER BACK
LOCATION SIMPLE: SCALP
LOCATION SIMPLE: LEFT FOREHEAD
LOCATION SIMPLE: LEFT FOREARM
LOCATION SIMPLE: LEFT HAND
LOCATION SIMPLE: LEFT WRIST
LOCATION SIMPLE: RIGHT EAR

## 2020-09-23 ASSESSMENT — LOCATION ZONE DERM
LOCATION ZONE: SCALP
LOCATION ZONE: ARM
LOCATION ZONE: TRUNK
LOCATION ZONE: HAND
LOCATION ZONE: EAR
LOCATION ZONE: FACE

## 2020-09-23 ASSESSMENT — LOCATION DETAILED DESCRIPTION DERM
LOCATION DETAILED: RIGHT DISTAL RADIAL DORSAL FOREARM
LOCATION DETAILED: RIGHT DISTAL DORSAL FOREARM
LOCATION DETAILED: RIGHT RADIAL DORSAL HAND
LOCATION DETAILED: LEFT INFERIOR POSTAURICULAR SKIN
LOCATION DETAILED: LEFT SUPERIOR HELIX
LOCATION DETAILED: RIGHT DORSAL INDEX METACARPOPHALANGEAL JOINT
LOCATION DETAILED: LEFT ANTERIOR EARLOBE
LOCATION DETAILED: LEFT DORSAL WRIST
LOCATION DETAILED: LEFT DISTAL ULNAR DORSAL FOREARM
LOCATION DETAILED: RIGHT ANTERIOR EARLOBE
LOCATION DETAILED: 3RD WEB SPACE RIGHT HAND
LOCATION DETAILED: SUPERIOR THORACIC SPINE
LOCATION DETAILED: LEFT DISTAL DORSAL FOREARM
LOCATION DETAILED: RIGHT PROXIMAL RADIAL DORSAL FOREARM
LOCATION DETAILED: RIGHT INFERIOR UPPER BACK
LOCATION DETAILED: LEFT FOREHEAD
LOCATION DETAILED: LEFT RADIAL DORSAL HAND

## 2020-09-23 ASSESSMENT — PAIN INTENSITY VAS: HOW INTENSE IS YOUR PAIN 0 BEING NO PAIN, 10 BEING THE MOST SEVERE PAIN POSSIBLE?: 1/10 PAIN

## 2020-09-23 NOTE — PROCEDURE: PATHOLOGY BILLING
Immunohistochemistry ([id number] and [id number]) billing is not performed here. Please use the Immunohistochemistry Stain Billing plan to accomplish this. Immunohistochemistry ([id number] and [id number]) billing is not performed here. Please use the Immunohistochemistry Stain Billing plan to accomplish this.

## 2020-09-23 NOTE — PROCEDURE: BIOPSY BY SHAVE METHOD
Bill [id number] For Specimen Handling/Conveyance To Laboratory?: no
Depth Of Biopsy: dermis
Silver Nitrate Text: The wound bed was treated with silver nitrate after the biopsy was performed.
Anesthesia Volume In Cc (Will Not Render If 0): 1
Additional Anesthesia Volume In Cc (Will Not Render If 0): 1.5
Electrodesiccation And Curettage Text: The wound bed was treated with electrodesiccation and curettage after the biopsy was performed.
Billing Type: Third-Party Bill
Information: Selecting Yes will display possible errors in your note based on the variables you have selected. This validation is only offered as a suggestion for you. PLEASE NOTE THAT THE VALIDATION TEXT WILL BE REMOVED WHEN YOU FINALIZE YOUR NOTE. IF YOU WANT TO FAX A PRELIMINARY NOTE YOU WILL NEED TO TOGGLE THIS TO 'NO' IF YOU DO NOT WANT IT IN YOUR FAXED NOTE.
Accession #: [id number]
Type Of Destruction Used: Curettage
Render Path Notes In Note?: Yes
Hemostasis: Aluminum Chloride
Biopsy Type: H and E
Wound Care: Vaseline
X Size Of Lesion In Cm: 0
Electrodesiccation Text: The wound bed was treated with electrodesiccation after the biopsy was performed.
Notification Instructions: Patient will be notified of biopsy results. However, patient instructed to call the office if not contacted within 2 weeks. After the procedure, the patient was oriented to person, place, and time. Patient denied feeling dizzy, queasy, and and declined further observation after initial 5 minute observation time.
Cryotherapy Text: The wound bed was treated with cryotherapy after the biopsy was performed.
Consent: Written consent was obtained and risks were reviewed including but not limited to scarring, infection, bleeding, scabbing, incomplete removal, nerve damage and allergy to anesthesia.
Biopsy Method: Personna blade
Dressing: bandage
Post-Care Instructions: I reviewed with the patient in detail post-care instructions. Patient is to keep the biopsy site dry overnight, and then apply Vaseline  daily until healed. Patient may apply hydrogen peroxide soaks to remove any crusting. After the procedure, the patient was oriented to person, place, and time. Patient denied feeling dizzy, queasy, and and declined further observation after initial 5 minute observation time.
Detail Level: Detailed
Curettage Text: The wound bed was treated with curettage after the biopsy was performed.
Anesthesia Type: 1% lidocaine with 1:100,000 epinephrine and a 1:10 solution of 8.4% sodium bicarbonate

## 2020-09-23 NOTE — PROCEDURE: REASSURANCE
Hide Additional Notes?: No
Detail Level: Detailed
Detail Level: Simple
Additional Notes (Optional): Compared to photo this lesion has resolved

## 2020-10-26 ENCOUNTER — APPOINTMENT (RX ONLY)
Dept: URBAN - METROPOLITAN AREA CLINIC 349 | Facility: CLINIC | Age: 70
Setting detail: DERMATOLOGY
End: 2020-10-26

## 2020-10-26 PROBLEM — E13.9 OTHER SPECIFIED DIABETES MELLITUS WITHOUT COMPLICATIONS: Status: ACTIVE | Noted: 2020-10-26

## 2020-10-26 PROBLEM — D04.62 CARCINOMA IN SITU OF SKIN OF LEFT UPPER LIMB, INCLUDING SHOULDER: Status: ACTIVE | Noted: 2020-10-26

## 2020-10-26 PROCEDURE — ? COUNSELING

## 2020-10-26 PROCEDURE — A4550 SURGICAL TRAYS: HCPCS

## 2020-10-26 PROCEDURE — ? CURETTAGE AND DESTRUCTION

## 2020-10-26 PROCEDURE — 17272 DSTR MAL LES S/N/H/F/G 1.1-2: CPT

## 2020-10-26 NOTE — PROCEDURE: CURETTAGE AND DESTRUCTION
Detail Level: Detailed
Add Ability To Document Additional Intralesional Injection: No
Anesthesia Type: 1% lidocaine with epinephrine and a 1:10 solution of 8.4% sodium bicarbonate
Cautery Type: electrodesiccation
Anesthesia Volume In Cc: 3
Size Of Lesion After Curettage: 1.2
Biopsy Photograph Reviewed: Yes
What Was Performed First?: Curettage
Consent was obtained from the patient. The risks, benefits and alternatives to therapy were discussed in detail. Specifically, the risks of infection, scarring, bleeding, prolonged wound healing, nerve injury, incomplete removal, allergy to anesthesia and recurrence were addressed. Alternatives to ED&C, such as: surgical removal and XRT were also discussed.  Prior to the procedure, the treatment site was clearly identified and confirmed by the patient. All components of Universal Protocol/PAUSE Rule completed.
Post-Care Instructions: I reviewed with the patient in detail post-care instructions. Patient is to keep the area dry for 48 hours, and not to engage in any swimming until the area is healed. Should the patient develop any fevers, chills, bleeding, severe pain patient will contact the office immediately. After the procedure, the patient was oriented to person, place, and time. Patient denied feeling dizzy, queasy, and and declined further observation after initial 5 minute observation time.
Bill As A Line Item Or As Units: Line Item
Total Volume (Ccs): 1
Additional Information: (Optional): The wound was cleaned, and a pressure dressing was applied.  The patient received detailed post-op instructions.

## 2021-03-12 NOTE — PROCEDURE: OTHER
Radames Prader had Sharona in August 2020, was not due. Did change Coding to resubmit for billing and LOS changed per Dr Madison Opitz.   Left msg to return call
Other (Free Text): Patient did complete treatment of [place]lak on scalp
Detail Level: Detailed
Note Text (......Xxx Chief Complaint.): This diagnosis correlates with the

## 2021-09-22 NOTE — WOUND CARE
02/13/19 9464 Wound Toe (specify in comments) Right;Medial  
Date First Assessed: 10/31/18   POA: Yes  Wound Type: Diabetic  Location: Toe (specify in comments)  Wound Description (Optional): Great Toe  Orientation: Right;Medial  
Dressing Status  Clean, dry, and intact Dressing Type  (epifix, mepilex transfer, rolled gauze) Wound Length (cm) 0.3 cm Wound Width (cm) 0.2 cm Wound Depth (cm) 0.1 Wound Surface area (cm^2) 0.06 cm^2 Change in Wound Size % 98.5 Condition of Base Epithelializing Drainage Amount  None Wound Odor None Periwound Skin Condition Intact Cleansing and Cleansing Agents  Normal saline Patient is currently taking aspirin Adore Lawton

## 2025-01-29 NOTE — PROCEDURES
January 29, 2025      [facility]  [facility] [facility] [address]  Phone: [phone]       Patient: Nisha Suh   YOB: 2006  Date of Visit: 01/29/2025    To Whom It May Concern:    Mary Suh  was at [facility] on 01/29/2025. The patient may return to work/school on 1/30/2025 with no restrictions. If you have any questions or concerns, or if I can be of further assistance, please do not hesitate to contact me.    Sincerely,    DARELL Palomino      Wound Center Debridement    Patient: Pricilla Norris MRN: [id number]  SSN: [id number]    YOB: 1950  Age: 78 y.o.   Sex: male      November 21, 2018     Procedure Performed By: Vee Smith DPM    Wound:  Right  Non Pressure Great Toe Breakdown of Skin     Type of Debridement:  Selective      Time Out Taken: Yes    Pain Control: Insensate      Type of Tissue Removed: Biofilm    Frequency of Debridements: PRN    Consent in chart     Instrument: Blade     Bleeding: Minimal     Hemostasis: Pressure     Procedural Pain: Insensate    Post-Procedural Pain: Insensate    Pre-Debridement Measurements: 2.5 x 1.8 x 0.1 cm    Post-Debridement Measurements: 2.5 x 1.8 x 0.1 cm    Surface Area Debrided: 4.5 sq. cm    Response to Procedure: tolerated the procedure well with no complications

## (undated) DEVICE — SLIM BODY SKIN STAPLER: Brand: APPOSE ULC

## (undated) DEVICE — SUTURE PERMAHAND SZ 2-0 L18IN NONABSORBABLE BLK L26MM PS 1588H

## (undated) DEVICE — VASELINE PETROLATUM GAUZE STRIP: Brand: VASELINE

## (undated) DEVICE — STERILE POLYISOPRENE POWDER-FREE SURGICAL GLOVES: Brand: PROTEXIS

## (undated) DEVICE — SURGICAL PROCEDURE PACK BASIC ST FRANCIS

## (undated) DEVICE — BUTTON SWITCH PENCIL BLADE ELECTRODE, HOLSTER: Brand: EDGE

## (undated) DEVICE — PROBE CRYOABLATION L10CM ALUM SMOOTH MAL CRYOICE

## (undated) DEVICE — MAGNETIC DRAPE: Brand: DEVON

## (undated) DEVICE — CATHETER THOR 36FR L23IN PVC R ANG 5 EYE TAPR CONN TIP SFT

## (undated) DEVICE — CATHETER THOR 32FR L23IN PVC 6 EYELET STR ATRAUM

## (undated) DEVICE — 3M™ IOBAN™ 2 ANTIMICROBIAL INCISE DRAPE 6650EZ: Brand: IOBAN™ 2

## (undated) DEVICE — CURITY NON-ADHERENT STRIPS: Brand: CURITY

## (undated) DEVICE — SUTURE VCRL SZ 0 L36IN ABSRB UD L36MM CT-1 1/2 CIR J946H

## (undated) DEVICE — REM POLYHESIVE ADULT PATIENT RETURN ELECTRODE: Brand: VALLEYLAB

## (undated) DEVICE — SCANLAN® SURG-I-LOOP® SILICONE LOOPS - BLUE SUPER MAXI, 5.0X1.27 MM, 16"/40 CM LONG (2/PKG): Brand: SCANLAN® SURG-I-LOOP® SILICONE LOOPS

## (undated) DEVICE — (D)PREP SKN CHLRAPRP APPL 26ML -- CONVERT TO ITEM 371833

## (undated) DEVICE — UNIVERSAL STAPLER: Brand: ENDO GIA ULTRA

## (undated) DEVICE — 2000CC GUARDIAN II: Brand: GUARDIAN

## (undated) DEVICE — SOLUTION IV 1000ML 0.9% SOD CHL

## (undated) DEVICE — DRAIN CHEST ADL/PED DRY/WET -- PLEUR-EVAC

## (undated) DEVICE — VINYL URETHRAL CATHETER: Brand: DOVER

## (undated) DEVICE — GEL MEDC ULTRASOUND 5L -- REPLACED BY 326862

## (undated) DEVICE — KIT THORCENT 8FR L5IN POLYUR W/ 18/22/25GA NDL 3 W STPCOCK

## (undated) DEVICE — UNIVERSAL DRAPES: Brand: MEDLINE INDUSTRIES, INC.

## (undated) DEVICE — ARTICULATING RELOAD WITH TRI-STAPLE TECHNOLOGY: Brand: ENDO GIA

## (undated) DEVICE — SUTURE VCRL SZ 2 L54IN ABSRB UD L65MM TP-1 1/2 CIR J880T

## (undated) DEVICE — SPONGE: SPECIALTY PEANUT XR 100/CS: Brand: MEDICAL ACTION INDUSTRIES

## (undated) DEVICE — KENDALL SCD EXPRESS SLEEVES, KNEE LENGTH, MEDIUM: Brand: KENDALL SCD

## (undated) DEVICE — DRAPE IRRIG FLD WRM W44XL44IN W/ AORN STD PRTBL INTRATEMP

## (undated) DEVICE — SUTURE PDS II SZ 1 L36IN ABSRB VLT L48MM CTX 1/2 CIR Z371T

## (undated) DEVICE — TRAY CATH 16F URIN MTR LTX -- CONVERT TO ITEM 363111